# Patient Record
Sex: FEMALE | Race: WHITE | NOT HISPANIC OR LATINO | Employment: FULL TIME | URBAN - METROPOLITAN AREA
[De-identification: names, ages, dates, MRNs, and addresses within clinical notes are randomized per-mention and may not be internally consistent; named-entity substitution may affect disease eponyms.]

---

## 2017-01-05 ENCOUNTER — RADIANT APPOINTMENT (OUTPATIENT)
Dept: GENERAL RADIOLOGY | Facility: CLINIC | Age: 32
End: 2017-01-05
Attending: FAMILY MEDICINE
Payer: COMMERCIAL

## 2017-01-05 ENCOUNTER — OFFICE VISIT (OUTPATIENT)
Dept: URGENT CARE | Facility: URGENT CARE | Age: 32
End: 2017-01-05
Payer: COMMERCIAL

## 2017-01-05 VITALS
DIASTOLIC BLOOD PRESSURE: 68 MMHG | TEMPERATURE: 98.2 F | SYSTOLIC BLOOD PRESSURE: 120 MMHG | OXYGEN SATURATION: 99 % | HEART RATE: 92 BPM

## 2017-01-05 DIAGNOSIS — S32.2XXA CLOSED FRACTURE OF COCCYX, INITIAL ENCOUNTER (H): Primary | ICD-10-CM

## 2017-01-05 DIAGNOSIS — M62.830 BACK MUSCLE SPASM: ICD-10-CM

## 2017-01-05 DIAGNOSIS — S39.92XA TAILBONE INJURY, INITIAL ENCOUNTER: ICD-10-CM

## 2017-01-05 PROCEDURE — 99214 OFFICE O/P EST MOD 30 MIN: CPT | Performed by: FAMILY MEDICINE

## 2017-01-05 PROCEDURE — 72220 X-RAY EXAM SACRUM TAILBONE: CPT

## 2017-01-05 RX ORDER — HYDROCODONE BITARTRATE AND ACETAMINOPHEN 5; 325 MG/1; MG/1
1-2 TABLET ORAL EVERY 6 HOURS PRN
Qty: 12 TABLET | Refills: 0 | Status: SHIPPED | OUTPATIENT
Start: 2017-01-05 | End: 2017-01-11

## 2017-01-05 RX ORDER — FLUOXETINE 10 MG/1
40 CAPSULE ORAL EVERY MORNING
Qty: 90 CAPSULE | Refills: 3 | COMMUNITY
Start: 2017-01-05 | End: 2021-10-25

## 2017-01-05 RX ORDER — CYCLOBENZAPRINE HCL 5 MG
5 TABLET ORAL EVERY 8 HOURS PRN
Qty: 30 TABLET | Refills: 0 | Status: SHIPPED | OUTPATIENT
Start: 2017-01-05 | End: 2017-02-07

## 2017-01-05 RX ORDER — ALBUTEROL SULFATE 90 UG/1
2 AEROSOL, METERED RESPIRATORY (INHALATION) EVERY 6 HOURS PRN
Qty: 3 INHALER | Refills: 1 | COMMUNITY
Start: 2017-01-05 | End: 2017-12-12

## 2017-01-05 NOTE — NURSING NOTE
Chief Complaint   Patient presents with     Urgent Care     Fall     Slipped on the ice on Sunday and landed on [facility].  Pain not improving after resting for a couple of days.     Initial /68 mmHg  Pulse 92  Temp(Src) 98.2  F (36.8  C) (Oral)  SpO2 99% There is no height or weight on file to calculate BMI.  BP completed using cuff size  regular    Ghazala Cerda/CMA

## 2017-01-05 NOTE — PROGRESS NOTES
"SUBJECTIVE:  Chief Complaint   Patient presents with     Urgent Care     Fall     Slipped on the ice on Sunday and landed on tailbone.  Pain not improving after resting for a couple of days.     Lizet Condon is a 31 year old female presents with a chief complaint of tailbone pain.  The injury occurred 4 day(s) ago (Sunday afternoon)   The injury happened while outside. How: fall, slipped on ice, landed on bottom on concrete, developed immediate pain.  Patient was able to get up, worse the following day.  The patient complained of moderate pain  and has had decreased ROM.  Pain exacerbated by standing and sitting.  Relieved by no movement, has to try to sit on side.  She treated it initially with ice and Ibuprofen. This is the first time this type of injury has occurred to this patient.     Patient states that had \"food poisoning\" for the past couple of days but has improved now.    No past medical history on file.  Current Outpatient Prescriptions   Medication Sig Dispense Refill     FLUoxetine (PROZAC) 10 MG capsule Take 1 capsule (10 mg) by mouth daily 90 capsule 3     albuterol (PROAIR HFA/PROVENTIL HFA/VENTOLIN HFA) 108 (90 BASE) MCG/ACT Inhaler Inhale 2 puffs into the lungs every 6 hours as needed for shortness of breath / dyspnea or wheezing 3 Inhaler 1     KLONOPIN 1 MG OR TABS 1 TABLET 3 TIMES DAILY 90 0     Social History   Substance Use Topics     Smoking status: Never Smoker      Smokeless tobacco: Not on file     Alcohol Use: Not on file       ROS:  CONSTITUTIONAL:NEGATIVE for fever, chills, change in weight  INTEGUMENTARY/SKIN: NEGATIVE for worrisome rashes, moles or lesions  ENT/MOUTH: NEGATIVE for ear, mouth and throat problems  RESP:NEGATIVE for significant cough or SOB  CV: NEGATIVE for chest pain, palpitations or peripheral edema  GI: NEGATIVE for nausea, abdominal pain, heartburn, or change in bowel habits  MUSCULOSKELETAL: POSITIVE  for injury to tailbone    EXAM:   /68 mmHg  " Pulse 92  Temp(Src) 98.2  F (36.8  C) (Oral)  SpO2 99%  Gen: healthy,alert,mild distress  Extremity: back and sacrum - has point tenderness along sacrum and decreased ROM, mild tenderness along paraspinal muscles.  No SI joint tenderness   There is not compromise to the distal circulation.  Pulses are +2 and CRT is brisk  EXTREMITIES: peripheral pulses normal  SKIN: no suspicious lesions or rashes  NEURO: Normal strength and tone, sensory exam grossly normal, mentation intact and speech normal    X-RAY was done - coccyx - irregular angulation at distal coccyx, possible fracture    ASSESSMENT/PLAN:   (S32.2XXA) Closed fracture of coccyx, initial encounter (H)  (primary encounter diagnosis)  Plan: HYDROcodone-acetaminophen (NORCO)        5-325 MG per tablet            (S39.92XA) Tailbone injury, initial encounter  Plan: XR Sacrum and Coccyx 2 Views            (M62.830) Back muscle spasm  Plan: cyclobenzaprine (FLEXERIL) 5 MG tablet            Reviewed symptomatic treatment with rest, ice, ibuprofen, doughnut pillow.  RX for flexeril given to help with back muscle spasm.  Small quantity of norco 5/325 mg #12 given for worse pain.  Follow up with primary provider if no resolution of symptoms.    Dm Zaldivar MD

## 2017-01-05 NOTE — MR AVS SNAPSHOT
After Visit Summary   1/5/2017    Lizet Condon    MRN: [id number]           Patient Information     Date Of Birth          1985        Visit Information        Provider Department      1/5/2017 12:00 PM Dm Zaldivar MD [facility]        Today's Diagnoses     Closed fracture of coccyx, initial encounter (H)    -  1     Tailbone injury, initial encounter         Back muscle spasm           Care Instructions    Okay to take ibuprofen 200 mg - 4 tablets (800 mg) every 8 hours as needed.  Okay to take tylenol 500 mg - 2 tablets (1000 mg) every 6-8 hours as needed, do not exceed 3000 mg in 24 hours.  Okay to take flexeril 5 mg - 1-2 tablets every 8 hours as needed for back muscles spasm.  Use norco sparingly for worse pain.  Ice to area of discomfort.      Tailbone (Coccyx) Fracture    The tailbone, or coccyx, is at the bottom of your spine. It is possible to fracture this bone when you fall and land in a seated position. This injury takes about 4 weeks to heal. Until then, it will be painful to sit and to have bowel movements.  Home care  1. Lying down or standing will be more comfortable than sitting. When you must sit, use a doughnut pillow. This is sold at most pharmacies or surgical and orthopedic supply stores. You can also make a doughnut pillow using a 4-inch foam pad with the center cut out.  2. Apply an ice pack over the injured area for not more than 20 minutes. Do this every 1 to 2 hours for the first 24 to 48 hours. Keep using ice packs as needed to ease pain and swelling. To make an ice pack, put ice cubes in a plastic bag that seals at the top. Wrap the bag in a clean, thin towel or cloth. Never put ice or an ice pack directly on the skin.  3. You may use over-the-counter pain medicine, unless another pain medicine was prescribed. Talk with your provider before using these medicines if you have chronic liver or kidney disease or ever had a stomach ulcer or GI  bleeding.  4. Keep your stools soft to avoid pain when having a bowel movement. Unless another medicine was prescribed, try the following:    If you are constipated: Use over-the-counter laxatives. Ask your pharmacist for recommendations for mild acting or stronger acting medicines    If you are not constipated: Use over-the-counter stool softeners to make passing stool less painful. Your pharmacist can suggest options. Adding fiber to your diet or using fiber supplements is a long-term solution to keep your stools soft and prevent constipation. Ask your pharmacist to recommend a fiber supplement.  Follow-up care  Follow up with your healthcare provider if your symptoms do not improve after 1 week.  If X-rays were taken, you will be notified of any new findings that may affect your care.  When to seek medical advice  Call your healthcare provider right away if:    Pain becomes worse or spreads to your legs  Call 911  Call 911 if you have:    Weakness or numbness in one or both legs    Loss of control over bowels or bladder, or numbness in the groin area    6735-0360 [employer]. [address] [place] [address]. All rights reserved. This information is not intended as a substitute for professional medical care. Always follow your healthcare professional's instructions.        Back Spasm (No Trauma)    Spasm of the back muscles can occur after a sudden forceful twisting or bending force (such as in a car accident), after a simple awkward movement, or after lifting something heavy with poor body positioning. In either case, muscle spasm adds to the pain. Sleeping in an awkward position or on a poor quality mattress can also cause this. Some persons respond to emotional stress by tensing the muscles of their back.  Pain that continues may require further evaluation or other types of treatment such as physical therapy.  Unless you had a physical injury (for example, a car accident or fall), X-rays  are usually not ordered for the initial evaluation of back pain. If pain continues and does not respond to medical treatment, X-rays and other tests may be performed at a later time.   Home care  5. As soon as possible, begin sitting or walking to avoid problems from prolonged bedrest (muscle weakness, worsening back stiffness and pain, blood clots in the legs).  6. When in bed, try to find a position of comfort. A firm mattress is best. Try lying flat on your back with pillows under your knees. You can also try lying on your side with your knees bent up toward your chest and a pillow between your knees.  7. Avoid prolonged sitting, long car rides or travel. This puts more stress on the lower back than standing or walking.   8. During the first 24 to 72 hours after an injury of flare-up apply an ice pack to the painful area for 20 minutes, then remove it for 20 minutes over a period of 60 to 90 minutes or several times a day. This will reduce swelling and pain. Always wrap ice packs in a thin towel.  9. You can start with ice, then switch to heat. Heat (hot shower, hot bath, or heating pad) reduces swelling and pain, and works well for muscle spasms. Heat can be applied to the painful area for 20 minutes , then remove it for 20 minutes over a period of 60 to 90 minutes or several times a day. As a safety precaution, do not sleep on a heating pad as it can burn or damage skin.  10. Ice and heat therapies can be alternated.  11. Gentle stretching will help your back heal faster. Perform this simple routine 2 to 3 times a day until your back is feeling better.     Low back stretch    Lie on your back with your knees bent and both feet on the ground.    Slowly raise your left knee to your chest as you flatten your lower back against the floor. Hold for 20 to 30 seconds.    Relax and repeat the exercise with your right knee.    Do 2 to 3 of these exercises for each leg.    Repeat, hugging both knees to your chest at the  same time.    Do not bounce, but use a gentle pull.    12. Be aware of safe lifting methods and do not lift anything over 15 pounds until all the pain is gone.  Medications  Talk to your doctor before using medications, especially if you have other medical problems or are taking other medicines.  You may use acetaminophen (such as Tylenol) or ibuprofen (such as Advil or Motrin) to control pain, unless another pain medicine was prescribed. If you have a chronic condition such as diabetes, liver or kidney disease, stomach ulcer, or gastrointestinal bleeding, or are taking blood thinners, talk with your doctor before taking any medications.  Be careful if you are given prescription pain medications, narcotics, or medication for muscle spasm. They can cause drowsiness, affect your coordination, reflexes or judgment. Do not drive or operate heavy machinery.  Follow-up care  Follow up with your doctor or this facility if your symptoms do not start to improve after one week. Physical therapy or further tests may be needed.  If X-rays were taken, they will be reviewed by a radiologist. You will be notified of any new findings that may affect your care.  Call 911  Seek emergency medica care if any of the following occur:    Trouble breathing    Confusion    Drowsiness or trouble awakening    Fainting or loss of consciousness    Rapid or very slow heart rate    Loss of bowel or bladder control  When to seek medical care  Get prompt medicat attention if any of the following occur:    Pain becomes worse or spreads to your legs    Weakness or numbness in one or both legs    Numbness in the groin or genital area    Unexplained fever over 100.4 F (38.0 C)    Burning or pain when passing urine    2798-0191 [employer]. [address] [place] [address], [place] [address]. All rights reserved. This information is not intended as a substitute for professional medical care. Always follow your healthcare professional's  "instructions.              Follow-ups after your visit        Who to contact     If you have questions or need follow up information about today's clinic visit or your schedule please contact [facility] directly at [phone].  Normal or non-critical lab and imaging results will be communicated to you by MyChart, letter or phone within 4 business days after the clinic has received the results. If you do not hear from us within 7 days, please contact the clinic through MyChart or phone. If you have a critical or abnormal lab result, we will notify you by phone as soon as possible.  Submit refill requests through [employer] or call your pharmacy and they will forward the refill request to us. Please allow 3 business days for your refill to be completed.          Additional Information About Your Visit        [employer] Information     [employer] lets you send messages to your doctor, view your test results, renew your prescriptions, schedule appointments and more. To sign up, go to [email or web address] . Click on \"Log in\" on the left side of the screen, which will take you to the Welcome page. Then click on \"Sign up Now\" on the right side of the page.     You will be asked to enter the access code listed below, as well as some personal information. Please follow the directions to create your username and password.     Your access code is: [email or web address]  Expires: 2017  1:32 PM     Your access code will  in 90 days. If you need help or a new code, please call your [facility] clinic or [phone].        Care EveryWhere ID     This is your Care EveryWhere ID. This could be used by other organizations to access your [facility] medical records  [id number]        Your Vitals Were     Pulse Temperature Pulse Oximetry             92 98.2  F (36.8  C) (Oral) 99%          Blood Pressure from Last 3 Encounters:   17 120/68   08 118/60    Weight from Last 3 Encounters:   No data found for Wt    "              Today's Medication Changes          These changes are accurate as of: 1/5/17  1:32 PM.  If you have any questions, ask your nurse or doctor.               Start taking these medicines.        Dose/Directions    cyclobenzaprine 5 MG tablet   Commonly known as:  FLEXERIL   Used for:  Back muscle spasm   Started by:  Dm Zaldivar MD        Dose:  5 mg   Take 1 tablet (5 mg) by mouth every 8 hours as needed for muscle spasms   Quantity:  30 tablet   Refills:  0       HYDROcodone-acetaminophen 5-325 MG per tablet   Commonly known as:  NORCO   Used for:  Closed fracture of coccyx, initial encounter (H)   Started by:  Dm Zaldivar MD        Dose:  1-2 tablet   Take 1-2 tablets by mouth every 6 hours as needed for moderate to severe pain   Quantity:  12 tablet   Refills:  0            Where to get your medicines      These medications were sent to [id number] - [place], [place] - [address]  [address] [facility] [address]     Phone:  [phone]    - cyclobenzaprine 5 MG tablet      Some of these will need a paper prescription and others can be bought over the counter.  Ask your nurse if you have questions.     Bring a paper prescription for each of these medications    - HYDROcodone-acetaminophen 5-325 MG per tablet             Primary Care Provider    None Specified       No primary provider on file.        Thank you!     Thank you for choosing [facility]  for your care. Our goal is always to provide you with excellent care. Hearing back from our patients is one way we can continue to improve our services. Please take a few minutes to complete the written survey that you may receive in the mail after your visit with us. Thank you!             Your Updated Medication List - Protect others around you: Learn how to safely use, store and throw away your medicines at www.disposemymeds.org.          This list is accurate as of: 1/5/17  1:32 PM.  Always use your most  recent med list.                   Brand Name Dispense Instructions for use    albuterol 108 (90 BASE) MCG/ACT Inhaler    PROAIR HFA/PROVENTIL HFA/VENTOLIN HFA    3 Inhaler    Inhale 2 puffs into the lungs every 6 hours as needed for shortness of breath / dyspnea or wheezing       cyclobenzaprine 5 MG tablet    FLEXERIL    30 tablet    Take 1 tablet (5 mg) by mouth every 8 hours as needed for muscle spasms       FLUoxetine 10 MG capsule    PROzac    90 capsule    Take 1 capsule (10 mg) by mouth daily       HYDROcodone-acetaminophen 5-325 MG per tablet    NORCO    12 tablet    Take 1-2 tablets by mouth every 6 hours as needed for moderate to severe pain       klonoPIN 1 MG tablet   Generic drug:  clonazePAM     90    1 TABLET 3 TIMES DAILY

## 2017-01-05 NOTE — PATIENT INSTRUCTIONS
Okay to take ibuprofen 200 mg - 4 tablets (800 mg) every 8 hours as needed.  Okay to take tylenol 500 mg - 2 tablets (1000 mg) every 6-8 hours as needed, do not exceed 3000 mg in 24 hours.  Okay to take flexeril 5 mg - 1-2 tablets every 8 hours as needed for back muscles spasm.  Use norco sparingly for worse pain.  Ice to area of discomfort.      Tailbone (Coccyx) Fracture    The tailbone, or coccyx, is at the bottom of your spine. It is possible to fracture this bone when you fall and land in a seated position. This injury takes about 4 weeks to heal. Until then, it will be painful to sit and to have bowel movements.  Home care  1. Lying down or standing will be more comfortable than sitting. When you must sit, use a doughnut pillow. This is sold at most pharmacies or surgical and orthopedic supply stores. You can also make a doughnut pillow using a 4-inch foam pad with the center cut out.  2. Apply an ice pack over the injured area for not more than 20 minutes. Do this every 1 to 2 hours for the first 24 to 48 hours. Keep using ice packs as needed to ease pain and swelling. To make an ice pack, put ice cubes in a plastic bag that seals at the top. Wrap the bag in a clean, thin towel or cloth. Never put ice or an ice pack directly on the skin.  3. You may use over-the-counter pain medicine, unless another pain medicine was prescribed. Talk with your provider before using these medicines if you have chronic liver or kidney disease or ever had a stomach ulcer or GI bleeding.  4. Keep your stools soft to avoid pain when having a bowel movement. Unless another medicine was prescribed, try the following:    If you are constipated: Use over-the-counter laxatives. Ask your pharmacist for recommendations for mild acting or stronger acting medicines    If you are not constipated: Use over-the-counter stool softeners to make passing stool less painful. Your pharmacist can suggest options. Adding fiber to your diet or using  fiber supplements is a long-term solution to keep your stools soft and prevent constipation. Ask your pharmacist to recommend a fiber supplement.  Follow-up care  Follow up with your healthcare provider if your symptoms do not improve after 1 week.  If X-rays were taken, you will be notified of any new findings that may affect your care.  When to seek medical advice  Call your healthcare provider right away if:    Pain becomes worse or spreads to your legs  Call 911  Call 911 if you have:    Weakness or numbness in one or both legs    Loss of control over bowels or bladder, or numbness in the groin area    4684-9510 [employer]. [address] [place] [address]. All rights reserved. This information is not intended as a substitute for professional medical care. Always follow your healthcare professional's instructions.        Back Spasm (No Trauma)    Spasm of the back muscles can occur after a sudden forceful twisting or bending force (such as in a car accident), after a simple awkward movement, or after lifting something heavy with poor body positioning. In either case, muscle spasm adds to the pain. Sleeping in an awkward position or on a poor quality mattress can also cause this. Some persons respond to emotional stress by tensing the muscles of their back.  Pain that continues may require further evaluation or other types of treatment such as physical therapy.  Unless you had a physical injury (for example, a car accident or fall), X-rays are usually not ordered for the initial evaluation of back pain. If pain continues and does not respond to medical treatment, X-rays and other tests may be performed at a later time.   Home care  5. As soon as possible, begin sitting or walking to avoid problems from prolonged bedrest (muscle weakness, worsening back stiffness and pain, blood clots in the legs).  6. When in bed, try to find a position of comfort. A firm mattress is best. Try lying flat  on your back with pillows under your knees. You can also try lying on your side with your knees bent up toward your chest and a pillow between your knees.  7. Avoid prolonged sitting, long car rides or travel. This puts more stress on the lower back than standing or walking.   8. During the first 24 to 72 hours after an injury of flare-up apply an ice pack to the painful area for 20 minutes, then remove it for 20 minutes over a period of 60 to 90 minutes or several times a day. This will reduce swelling and pain. Always wrap ice packs in a thin towel.  9. You can start with ice, then switch to heat. Heat (hot shower, hot bath, or heating pad) reduces swelling and pain, and works well for muscle spasms. Heat can be applied to the painful area for 20 minutes , then remove it for 20 minutes over a period of 60 to 90 minutes or several times a day. As a safety precaution, do not sleep on a heating pad as it can burn or damage skin.  10. Ice and heat therapies can be alternated.  11. Gentle stretching will help your back heal faster. Perform this simple routine 2 to 3 times a day until your back is feeling better.     Low back stretch    Lie on your back with your knees bent and both feet on the ground.    Slowly raise your left knee to your chest as you flatten your lower back against the floor. Hold for 20 to 30 seconds.    Relax and repeat the exercise with your right knee.    Do 2 to 3 of these exercises for each leg.    Repeat, hugging both knees to your chest at the same time.    Do not bounce, but use a gentle pull.    12. Be aware of safe lifting methods and do not lift anything over 15 pounds until all the pain is gone.  Medications  Talk to your doctor before using medications, especially if you have other medical problems or are taking other medicines.  You may use acetaminophen (such as Tylenol) or ibuprofen (such as Advil or Motrin) to control pain, unless another pain medicine was prescribed. If you have a  chronic condition such as diabetes, liver or kidney disease, stomach ulcer, or gastrointestinal bleeding, or are taking blood thinners, talk with your doctor before taking any medications.  Be careful if you are given prescription pain medications, narcotics, or medication for muscle spasm. They can cause drowsiness, affect your coordination, reflexes or judgment. Do not drive or operate heavy machinery.  Follow-up care  Follow up with your doctor or this facility if your symptoms do not start to improve after one week. Physical therapy or further tests may be needed.  If X-rays were taken, they will be reviewed by a radiologist. You will be notified of any new findings that may affect your care.  Call 911  Seek emergency medica care if any of the following occur:    Trouble breathing    Confusion    Drowsiness or trouble awakening    Fainting or loss of consciousness    Rapid or very slow heart rate    Loss of bowel or bladder control  When to seek medical care  Get prompt medicat attention if any of the following occur:    Pain becomes worse or spreads to your legs    Weakness or numbness in one or both legs    Numbness in the groin or genital area    Unexplained fever over 100.4 F (38.0 C)    Burning or pain when passing urine    2901-3721 [employer]. [address] [place] [address], [place] [address]. All rights reserved. This information is not intended as a substitute for professional medical care. Always follow your healthcare professional's instructions.

## 2017-01-06 ENCOUNTER — TELEPHONE (OUTPATIENT)
Dept: URGENT CARE | Facility: URGENT CARE | Age: 32
End: 2017-01-06

## 2017-01-06 NOTE — Clinical Note
Lizet Condon  [id number] [facility] [address]    January 12, 2017    Dear Lizet,     Below are your results from the urgent care visit on 1/06/2017.     X-rays of the sacrum and coccyx showed no fracture. Continue ice applications and sitting on soft cushions. Take Tylenol, ibuprofen for pain. follow up with the primary care provider if not better in 2-3 weeks.    If you have any questions please call us at [phone].      Thank you,   [facility]

## 2017-01-06 NOTE — TELEPHONE ENCOUNTER
SUBJECTIVE:  The radiology report on the patient's January 5, 2017, X-rays of the sacrum and coccyx showed no fracture.      PLAN:  Please notify patient of this result.  Continue ice applications and sitting on soft cushions. Take Tylenol, ibuprofen for pain.  follow up with the primary care provider if not better in 2-3 weeks.     Davon Roth MD

## 2017-01-11 ENCOUNTER — OFFICE VISIT (OUTPATIENT)
Dept: PEDIATRICS | Facility: CLINIC | Age: 32
End: 2017-01-11
Payer: COMMERCIAL

## 2017-01-11 VITALS
DIASTOLIC BLOOD PRESSURE: 70 MMHG | HEIGHT: 62 IN | TEMPERATURE: 99.1 F | BODY MASS INDEX: 25.76 KG/M2 | HEART RATE: 92 BPM | OXYGEN SATURATION: 100 % | SYSTOLIC BLOOD PRESSURE: 100 MMHG | WEIGHT: 140 LBS

## 2017-01-11 DIAGNOSIS — S32.2XXA CLOSED FRACTURE OF COCCYX, INITIAL ENCOUNTER (H): Primary | ICD-10-CM

## 2017-01-11 PROCEDURE — 99213 OFFICE O/P EST LOW 20 MIN: CPT | Mod: GE | Performed by: INTERNAL MEDICINE

## 2017-01-11 RX ORDER — HYDROCODONE BITARTRATE AND ACETAMINOPHEN 5; 325 MG/1; MG/1
1-2 TABLET ORAL EVERY 6 HOURS PRN
Qty: 12 TABLET | Refills: 0 | Status: SHIPPED | OUTPATIENT
Start: 2017-01-11 | End: 2017-01-19

## 2017-01-11 NOTE — PATIENT INSTRUCTIONS
1. 600 mg of ibuprofen three time a day for the next week to decrease inflammation.   2. OK to use tylenol in addition, don't use more than 3000 mg in 24 hours.  3. Use the Norco as needed for pain, be aware of side effects of somnolence, no driving, heavy machinery, etc. Can also cause constipation.   4. If your pain worsens or is not dramatically improved by the end of January, call and we can refer to sports med/ortho for evaluation of need for further interventions.

## 2017-01-11 NOTE — PROGRESS NOTES
"  SUBJECTIVE:                                                    Lizet Condon is a 31 year old female who presents to clinic today for the following health issues:      ED/UC Followup:    Facility:  [facility]   Date of visit: 01-05-17  Reason for visit: Closed fracture of coccyx   Current Status: Pt reports no improvement, still continuing to have pain, difficult to move. Pt has been using an inflatable neck pillow to sit on.      10 days ago she was walking on ice, slipped, and fell on buttocks. Was seen in UC 6 days ago and had xr with some angulation, no obvious fracture. Continues to have severe pain, worse with bending over, reaching forward, getting in and out of the car. Has been using an inflatable neck pillow to sit on which helps. If she moves at the wrong angle she has to stop and wrest. She is standing a lot of the time a work. Also feels like a kettle bell is pushing forward in her pelvis. Every morning the pain seems a bit better but then throughout the day it becomes quite severe again. Sleep has helped, Flexeril has helped, donut helps. Norco helps the pain. No bowel or bladder problems. No leg weakness or numbness.     Problem list and histories reviewed & adjusted, as indicated.  Additional history: as documented    Problem list, Medication list, Allergies, and Medical/Social/Surgical histories reviewed in [facility] and updated as appropriate.    ROS:  Constitutional, HEENT, cardiovascular, pulmonary, gi and gu systems are negative, except as otherwise noted.    OBJECTIVE:                                                    /70 mmHg  Pulse 92  Temp(Src) 99.1  F (37.3  C) (Tympanic)  Ht 5' 2\" (1.575 m)  Wt 140 lb (63.504 kg)  BMI 25.60 kg/m2  SpO2 100%  Body mass index is 25.6 kg/(m^2).  GENERAL: healthy, alert and no distress  NECK: no adenopathy, no asymmetry, masses, or scars and thyroid normal to palpation  RESP: lungs clear to auscultation - no rales, rhonchi or " wheezes  CV: regular rate and rhythm, normal S1 S2, no S3 or S4, no murmur, click or rub, no peripheral edema and peripheral pulses strong  Comprehensive back pain exam:  Tenderness of the coccyx at the superior aspect of the gluteal cleft with some mild ecchymosis, Range of motion not limited by pain, Lower extremity strength functional and equal on both sides, Lower extremity reflexes within normal limits bilaterally, Lower extremity sensation normal and equal on both sides. Able to walk on heels and toes.     Diagnostic Test Results:  none      ASSESSMENT/PLAN:                                                    1. Closed fracture of coccyx, initial encounter (H)  Though no obvious fracture on xray with the angulation that is present, symptoms, and exam suspect that she does have a fracture. She has no neuro findings and no midline back pain to suggest additional injury. We will do scheduled ibuprofen for 1 week with Norco for breakthrough pain. If not improved dramatically by the end of the month will refer to sports med/ortho for further evaluation. Reviewed appropriate use of Norco.   - HYDROcodone-acetaminophen (NORCO) 5-325 MG per tablet; Take 1-2 tablets by mouth every 6 hours as needed for moderate to severe pain  Dispense: 12 tablet; Refill: 0    Patient Instructions   1. 600 mg of ibuprofen three time a day for the next week to decrease inflammation.   2. OK to use tylenol in addition, don't use more than 3000 mg in 24 hours.  3. Use the Norco as needed for pain, be aware of side effects of somnolence, no driving, heavy machinery, etc. Can also cause constipation.   4. If your pain worsens or is not dramatically improved by the end of January, call and we can refer to sports med/ortho for evaluation of need for further interventions.       Pedro Pettit MD  [facility]    I have discussed the patient with the resident and agree with the jointly developed plan as documented  above    Connie Hansen MD  Internal Medicine - Pediatrics

## 2017-01-11 NOTE — MR AVS SNAPSHOT
"              After Visit Summary   1/11/2017    Lizet Condon    MRN: [id number]           Patient Information     Date Of Birth          1985        Visit Information        Provider Department      1/11/2017 1:00 PM [facility]        Today's Diagnoses     Closed fracture of coccyx, initial encounter (H)    -  1       Care Instructions    1. 600 mg of ibuprofen three time a day for the next week to decrease inflammation.   2. OK to use tylenol in addition, don't use more than 3000 mg in 24 hours.  3. Use the Norco as needed for pain, be aware of side effects of somnolence, no driving, heavy machinery, etc. Can also cause constipation.   4. If your pain worsens or is not dramatically improved by the end of January, call and we can refer to sports med/ortho for evaluation of need for further interventions.         Follow-ups after your visit        Who to contact     If you have questions or need follow up information about today's clinic visit or your schedule please contact [facility] directly at [phone].  Normal or non-critical lab and imaging results will be communicated to you by friendfundhart, letter or phone within 4 business days after the clinic has received the results. If you do not hear from us within 7 days, please contact the clinic through [employer] or phone. If you have a critical or abnormal lab result, we will notify you by phone as soon as possible.  Submit refill requests through [employer] or call your pharmacy and they will forward the refill request to us. Please allow 3 business days for your refill to be completed.          Additional Information About Your Visit        MyChart Information     [employer] lets you send messages to your doctor, view your test results, renew your prescriptions, schedule appointments and more. To sign up, go to [email or web address] . Click on \"Log in\" on the left side of the screen, which will take " "you to the Welcome page. Then click on \"Sign up Now\" on the right side of the page.     You will be asked to enter the access code listed below, as well as some personal information. Please follow the directions to create your username and password.     Your access code is: [email or web address]  Expires: 2017  1:32 PM     Your access code will  in 90 days. If you need help or a new code, please call your [place] clinic or [phone].        Care EveryWhere ID     This is your Care EveryWhere ID. This could be used by other organizations to access your [place] medical records  [id number]        Your Vitals Were     Pulse Temperature Height BMI (Body Mass Index) Pulse Oximetry       92 99.1  F (37.3  C) (Tympanic) 5' 2\" (1.575 m) 25.60 kg/m2 100%        Blood Pressure from Last 3 Encounters:   17 100/70   17 120/68   08 118/60    Weight from Last 3 Encounters:   17 140 lb (63.504 kg)              Today, you had the following     No orders found for display         Where to get your medicines      Some of these will need a paper prescription and others can be bought over the counter.  Ask your nurse if you have questions.     Bring a paper prescription for each of these medications    - HYDROcodone-acetaminophen 5-325 MG per tablet       Primary Care Provider    None Specified       No primary provider on file.        Thank you!     Thank you for choosing [facility]  for your care. Our goal is always to provide you with excellent care. Hearing back from our patients is one way we can continue to improve our services. Please take a few minutes to complete the written survey that you may receive in the mail after your visit with us. Thank you!             Your Updated Medication List - Protect others around you: Learn how to safely use, store and throw away your medicines at www.disposemymeds.org.          This list is accurate as of: 17  1:40 PM.  Always use your most " recent med list.                   Brand Name Dispense Instructions for use    albuterol 108 (90 BASE) MCG/ACT Inhaler    PROAIR HFA/PROVENTIL HFA/VENTOLIN HFA    3 Inhaler    Inhale 2 puffs into the lungs every 6 hours as needed for shortness of breath / dyspnea or wheezing       cyclobenzaprine 5 MG tablet    FLEXERIL    30 tablet    Take 1 tablet (5 mg) by mouth every 8 hours as needed for muscle spasms       FLUoxetine 10 MG capsule    PROzac    90 capsule    Take 1 capsule (10 mg) by mouth daily       HYDROcodone-acetaminophen 5-325 MG per tablet    NORCO    12 tablet    Take 1-2 tablets by mouth every 6 hours as needed for moderate to severe pain       klonoPIN 1 MG tablet   Generic drug:  clonazePAM     90    1 TABLET 3 TIMES DAILY

## 2017-01-11 NOTE — NURSING NOTE
"Chief Complaint   Patient presents with     RECHECK     Follow up        Initial /70 mmHg  Pulse 92  Temp(Src) 99.1  F (37.3  C) (Tympanic)  Ht 5' 2\" (1.575 m)  Wt 140 lb (63.504 kg)  BMI 25.60 kg/m2  SpO2 100% Estimated body mass index is 25.6 kg/(m^2) as calculated from the following:    Height as of this encounter: 5' 2\" (1.575 m).    Weight as of this encounter: 140 lb (63.504 kg).  BP completed using cuff size: regular    "

## 2017-01-19 DIAGNOSIS — S32.2XXA CLOSED FRACTURE OF COCCYX, INITIAL ENCOUNTER (H): Primary | ICD-10-CM

## 2017-01-19 RX ORDER — HYDROCODONE BITARTRATE AND ACETAMINOPHEN 5; 325 MG/1; MG/1
1-2 TABLET ORAL EVERY 6 HOURS PRN
Qty: 12 TABLET | Refills: 0 | Status: SHIPPED | OUTPATIENT
Start: 2017-01-19 | End: 2017-02-08

## 2017-01-19 NOTE — TELEPHONE ENCOUNTER
Placed RX at  , spoke to patient and informed her of below - pt verbalized understanding.     Zuleyma James MA

## 2017-01-19 NOTE — TELEPHONE ENCOUNTER
Script printed, signed, and in station out basket.    If not improving over the next week, would recommend that patient see orthopedics - please let her know.  I'm happy to send her anytime.

## 2017-01-19 NOTE — TELEPHONE ENCOUNTER
HYDROCO-ACETAMINOPHEN 5/325MG      Last Written Prescription Date:  1/11/2017  Last Fill Quantity: 12,   # refills: 0  Last Office Visit with [facility] prescribing provider: 1/11/2017  Future Office visit:       Routing refill request to provider for review/approval because:  Drug not on the [facility] refill protocol or controlled substance

## 2017-02-07 DIAGNOSIS — M62.830 BACK MUSCLE SPASM: Primary | ICD-10-CM

## 2017-02-07 DIAGNOSIS — S32.2XXA CLOSED FRACTURE OF COCCYX, INITIAL ENCOUNTER (H): ICD-10-CM

## 2017-02-07 NOTE — TELEPHONE ENCOUNTER
cyclobenzaprine (FLEXERIL) 5 MG tablet  Last Written Prescription Date:  1/5/17  Last Fill Quantity: 30,   # refills: 0  Last Office Visit with [facility] prescribing provider: 1/11/17 Dr. Agudelo and 1/5/17 Dr. Zaldivar  Future Office visit:    Next 5 appointments (look out 90 days)     Feb 09, 2017  3:45 PM   [facility]    [address] [place] [id number] [phone]                   Routing refill request to provider for review/approval because:  Drug not on the [facility] refill protocol or controlled substance

## 2017-02-08 RX ORDER — CYCLOBENZAPRINE HCL 5 MG
5 TABLET ORAL EVERY 8 HOURS PRN
Qty: 30 TABLET | Refills: 0 | Status: SHIPPED | OUTPATIENT
Start: 2017-02-08 | End: 2017-03-06

## 2017-02-08 RX ORDER — HYDROCODONE BITARTRATE AND ACETAMINOPHEN 5; 325 MG/1; MG/1
1-2 TABLET ORAL EVERY 6 HOURS PRN
Qty: 12 TABLET | Refills: 0 | Status: SHIPPED | OUTPATIENT
Start: 2017-02-08 | End: 2017-02-15

## 2017-02-08 NOTE — TELEPHONE ENCOUNTER
HYDROcodone-acetaminophen (NORCO) 5-325 MG per tablet        Last Written Prescription Date:  01/19/2017  Last Fill Quantity: 12,   # refills: 0  Last Office Visit with [facility] prescribing provider: 01/11/2016  Future Office visit:    Next 5 appointments (look out 90 days)     Feb 09, 2017  3:45 PM   [facility]    [address] [place] [id number] [phone]                   Routing refill request to provider for review/approval because:  Drug not on the [facility], [facility] or [facility] refill protocol or controlled substance

## 2017-02-08 NOTE — TELEPHONE ENCOUNTER
Routing refill request to provider for review/approval because:  Drug not on the [facility] refill protocol please sign if ok.   Sarah Huynh, ERNESTINA  Triage Nurse

## 2017-02-09 ENCOUNTER — OFFICE VISIT (OUTPATIENT)
Dept: PEDIATRICS | Facility: CLINIC | Age: 32
End: 2017-02-09
Payer: COMMERCIAL

## 2017-02-09 VITALS
RESPIRATION RATE: 18 BRPM | OXYGEN SATURATION: 99 % | DIASTOLIC BLOOD PRESSURE: 74 MMHG | WEIGHT: 152.56 LBS | SYSTOLIC BLOOD PRESSURE: 122 MMHG | HEIGHT: 62 IN | BODY MASS INDEX: 28.07 KG/M2 | TEMPERATURE: 99 F | HEART RATE: 101 BPM

## 2017-02-09 DIAGNOSIS — S32.2XXD CLOSED FRACTURE OF COCCYX WITH ROUTINE HEALING, SUBSEQUENT ENCOUNTER: Primary | ICD-10-CM

## 2017-02-09 PROCEDURE — 99214 OFFICE O/P EST MOD 30 MIN: CPT | Mod: GC | Performed by: STUDENT IN AN ORGANIZED HEALTH CARE EDUCATION/TRAINING PROGRAM

## 2017-02-09 RX ORDER — NORGESTIMATE AND ETHINYL ESTRADIOL 7DAYSX3 28
KIT ORAL
COMMUNITY
Start: 2017-01-14 | End: 2017-12-12

## 2017-02-09 NOTE — MR AVS SNAPSHOT
After Visit Summary   2/9/2017    Lizet Condon    MRN: [id number]           Patient Information     Date Of Birth          1985        Visit Information        Provider Department      2/9/2017 3:45 PM Eliazar Beasley MD [facility]        Today's Diagnoses     Closed fracture of coccyx with routine healing, subsequent encounter    -  1       Care Instructions      Tailbone (Coccyx) Fracture    The tailbone, or coccyx, is at the bottom of your spine. It is possible to fracture this bone when you fall and land in a seated position. This injury takes about 4 weeks to heal. Until then, it will be painful to sit and to have bowel movements.  Home care    Lying down or standing will be more comfortable than sitting. When you must sit, use a doughnut pillow. This is sold at most pharmacies or surgical and orthopedic supply stores. You can also make a doughnut pillow using a 4-inch foam pad with the center cut out.    Apply an ice pack over the injured area for not more than 20 minutes. Do this every 1 to 2 hours for the first 24 to 48 hours. Keep using ice packs as needed to ease pain and swelling. To make an ice pack, put ice cubes in a plastic bag that seals at the top. Wrap the bag in a clean, thin towel or cloth. Never put ice or an ice pack directly on the skin.    You may use over-the-counter pain medicine, unless another pain medicine was prescribed. Talk with your provider before using these medicines if you have chronic liver or kidney disease or ever had a stomach ulcer or GI bleeding.    Keep your stools soft to avoid pain when having a bowel movement. Unless another medicine was prescribed, try the following:    If you are constipated: Use over-the-counter laxatives. Ask your pharmacist for recommendations for mild acting or stronger acting medicines    If you are not constipated: Use over-the-counter stool softeners to make passing stool less painful. Your  pharmacist can suggest options. Adding fiber to your diet or using fiber supplements is a long-term solution to keep your stools soft and prevent constipation. Ask your pharmacist to recommend a fiber supplement.  Follow-up care  Follow up with your healthcare provider if your symptoms do not improve after 1 week.  If X-rays were taken, you will be notified of any new findings that may affect your care.  When to seek medical advice  Call your healthcare provider right away if:    Pain becomes worse or spreads to your legs  Call 911  Call 911 if you have:    Weakness or numbness in one or both legs    Loss of control over bowels or bladder, or numbness in the groin area    7541-4371 [employer]. [address] [place] [address], [place] [address]. All rights reserved. This information is not intended as a substitute for professional medical care. Always follow your healthcare professional's instructions.    Please continue to rehab with physical exercises provided.  Please continue to dough nut pillow to provide cushioning.  Take Tylenol 650 mg every 6 hours and alternate with ibuprofen 600 mg every 6 hours.   Ice the area as tolerated.  Use norco and flexeril for break through pain and muscle tightness/spasms.  If you have any loss of bowel/bladder, loss of sensation in lower legs, falling frequently, or saddle paresthesias please go to your local ED for further evaluation.       Eliazar Beasley MD  IM/PEDS, PGY2  pager [phone]          Follow-ups after your visit        Who to contact     If you have questions or need follow up information about today's clinic visit or your schedule please contact [facility] directly at [phone].  Normal or non-critical lab and imaging results will be communicated to you by MyChart, letter or phone within 4 business days after the clinic has received the results. If you do not hear from us within 7 days, please contact the clinic through MyChart or phone.  "If you have a critical or abnormal lab result, we will notify you by phone as soon as possible.  Submit refill requests through [employer] or call your pharmacy and they will forward the refill request to us. Please allow 3 business days for your refill to be completed.          Additional Information About Your Visit        [employer] Information     [employer] lets you send messages to your doctor, view your test results, renew your prescriptions, schedule appointments and more. To sign up, go to [email or web address] . Click on \"Log in\" on the left side of the screen, which will take you to the Welcome page. Then click on \"Sign up Now\" on the right side of the page.     You will be asked to enter the access code listed below, as well as some personal information. Please follow the directions to create your username and password.     Your access code is: [email or web address]  Expires: 2017  1:32 PM     Your access code will  in 90 days. If you need help or a new code, please call your [place] clinic or [phone].        Care EveryWhere ID     This is your Care EveryWhere ID. This could be used by other organizations to access your [place] medical records  [id number]        Your Vitals Were     Pulse Temperature Respirations Height BMI (Body Mass Index) Pulse Oximetry    101 99  F (37.2  C) (Tympanic) 18 1.575 m (5' 2\") 27.90 kg/m2 99%    Breastfeeding?                   No            Blood Pressure from Last 3 Encounters:   17 122/74   17 100/70   17 120/68    Weight from Last 3 Encounters:   17 69.202 kg (152 lb 9 oz)   17 63.504 kg (140 lb)              Today, you had the following     No orders found for display       Primary Care Provider    None Specified       No primary provider on file.        Thank you!     Thank you for choosing [facility]  for your care. Our goal is always to provide you with excellent care. Hearing back from our patients is one way we can " continue to improve our services. Please take a few minutes to complete the written survey that you may receive in the mail after your visit with us. Thank you!             Your Updated Medication List - Protect others around you: Learn how to safely use, store and throw away your medicines at www.disposemymeds.org.          This list is accurate as of: 2/9/17  4:07 PM.  Always use your most recent med list.                   Brand Name Dispense Instructions for use    albuterol 108 (90 BASE) MCG/ACT Inhaler    PROAIR HFA/PROVENTIL HFA/VENTOLIN HFA    3 Inhaler    Inhale 2 puffs into the lungs every 6 hours as needed for shortness of breath / dyspnea or wheezing       cyclobenzaprine 5 MG tablet    FLEXERIL    30 tablet    Take 1 tablet (5 mg) by mouth every 8 hours as needed for muscle spasms       FLUoxetine 10 MG capsule    PROzac    90 capsule    Take 1 capsule (10 mg) by mouth daily       HYDROcodone-acetaminophen 5-325 MG per tablet    NORCO    12 tablet    Take 1-2 tablets by mouth every 6 hours as needed for moderate to severe pain       klonoPIN 1 MG tablet   Generic drug:  clonazePAM     90    1 TABLET 3 TIMES DAILY       norgestim-eth estrad triphasic 0.18/0.215/0.25 MG-35 MCG per tablet    ORTHO TRI-CYCLEN, TRI-SPRINTEC

## 2017-02-09 NOTE — PATIENT INSTRUCTIONS
Tailbone (Coccyx) Fracture    The tailbone, or coccyx, is at the bottom of your spine. It is possible to fracture this bone when you fall and land in a seated position. This injury takes about 4 weeks to heal. Until then, it will be painful to sit and to have bowel movements.  Home care    Lying down or standing will be more comfortable than sitting. When you must sit, use a doughnut pillow. This is sold at most pharmacies or surgical and orthopedic supply stores. You can also make a doughnut pillow using a 4-inch foam pad with the center cut out.    Apply an ice pack over the injured area for not more than 20 minutes. Do this every 1 to 2 hours for the first 24 to 48 hours. Keep using ice packs as needed to ease pain and swelling. To make an ice pack, put ice cubes in a plastic bag that seals at the top. Wrap the bag in a clean, thin towel or cloth. Never put ice or an ice pack directly on the skin.    You may use over-the-counter pain medicine, unless another pain medicine was prescribed. Talk with your provider before using these medicines if you have chronic liver or kidney disease or ever had a stomach ulcer or GI bleeding.    Keep your stools soft to avoid pain when having a bowel movement. Unless another medicine was prescribed, try the following:    If you are constipated: Use over-the-counter laxatives. Ask your pharmacist for recommendations for mild acting or stronger acting medicines    If you are not constipated: Use over-the-counter stool softeners to make passing stool less painful. Your pharmacist can suggest options. Adding fiber to your diet or using fiber supplements is a long-term solution to keep your stools soft and prevent constipation. Ask your pharmacist to recommend a fiber supplement.  Follow-up care  Follow up with your healthcare provider if your symptoms do not improve after 1 week.  If X-rays were taken, you will be notified of any new findings that may affect your care.  When to  seek medical advice  Call your healthcare provider right away if:    Pain becomes worse or spreads to your legs  Call 911  Call 911 if you have:    Weakness or numbness in one or both legs    Loss of control over bowels or bladder, or numbness in the groin area    [id number] [employer]. [address] [place] [address]. All rights reserved. This information is not intended as a substitute for professional medical care. Always follow your healthcare professional's instructions.    Please continue to rehab with physical exercises provided.  Please continue to dough nut pillow to provide cushioning.  Take Tylenol 650 mg every 6 hours and alternate with ibuprofen 600 mg every 6 hours.   Ice the area as tolerated.  Use norco and flexeril for break through pain and muscle tightness/spasms.  If you have any loss of bowel/bladder, loss of sensation in lower legs, falling frequently, or saddle paresthesias please go to your local ED for further evaluation.       Eliazar Beasley MD  IM/PEDS, PGY2  pager [phone]

## 2017-02-09 NOTE — NURSING NOTE
"No chief complaint on file.      Initial /74 mmHg  Pulse 101  Temp(Src) 99  F (37.2  C) (Tympanic)  Resp 18  Ht 5' 2\" (1.575 m)  Wt 152 lb 9 oz (69.202 kg)  BMI 27.90 kg/m2  SpO2 99%  Breastfeeding? No Estimated body mass index is 27.9 kg/(m^2) as calculated from the following:    Height as of this encounter: 5' 2\" (1.575 m).    Weight as of this encounter: 152 lb 9 oz (69.202 kg).  Medication Reconciliation: complete     Michelle Goodman MA 2/9/2017 3:44 PM      "

## 2017-02-09 NOTE — PROGRESS NOTES
SUBJECTIVE:                                                    Lizet Condon is a 31 year old female who presents to clinic today for the following health issues:      Pt is here today to follow up from office visit 1/11/17 for closed fracture coccyx 5 weeks ago.  Pt states she is doing better, able to move and bend but still in pain, pain level is at 6/10. Pt has been taking Norco and Flexeril which has been helping.  Taking every couple days with excessive work load.  Pt also has form from her work which needs to be completed.  She has also been using a donut pillow with relief in pain.  She would continue to rehab personally on her own as opposed to referral for further evaluation as it has been improving slowly but surely.  Denies any paresthesias or loss of strength; endorses slight difficulties with balance.  She attributes it mostly due to discomfort as opposed to pain.      Problem list and histories reviewed & adjusted, as indicated.  Additional history: as documented    There is no problem list on file for this patient.    No past surgical history on file.    Social History   Substance Use Topics     Smoking status: Never Smoker      Smokeless tobacco: Never Used     Alcohol Use: 0.0 oz/week     0 Standard drinks or equivalent per week     Family History   Problem Relation Age of Onset     Family History Negative Mother          Current Outpatient Prescriptions   Medication Sig Dispense Refill     norgestim-eth estrad triphasic (ORTHO TRI-CYCLEN, TRI-SPRINTEC) 0.18/0.215/0.25 MG-35 MCG per tablet        cyclobenzaprine (FLEXERIL) 5 MG tablet Take 1 tablet (5 mg) by mouth every 8 hours as needed for muscle spasms 30 tablet 0     HYDROcodone-acetaminophen (NORCO) 5-325 MG per tablet Take 1-2 tablets by mouth every 6 hours as needed for moderate to severe pain 12 tablet 0     FLUoxetine (PROZAC) 10 MG capsule Take 1 capsule (10 mg) by mouth daily 90 capsule 3     albuterol (PROAIR HFA/PROVENTIL  "HFA/VENTOLIN HFA) 108 (90 BASE) MCG/ACT Inhaler Inhale 2 puffs into the lungs every 6 hours as needed for shortness of breath / dyspnea or wheezing 3 Inhaler 1     KLONOPIN 1 MG OR TABS 1 TABLET 3 TIMES DAILY 90 0     Allergies   Allergen Reactions     No Known Drug Allergies      No lab results found.   BP Readings from Last 3 Encounters:   02/09/17 122/74   01/11/17 100/70   01/05/17 120/68    Wt Readings from Last 3 Encounters:   02/09/17 69.202 kg (152 lb 9 oz)   01/11/17 63.504 kg (140 lb)            Labs reviewed in EPIC  Problem list, Medication list, Allergies, and Medical/Social/Surgical histories reviewed in [facility] and updated as appropriate.    ROS:  A focused ROS was obtained and documented as above.    OBJECTIVE:                                                    /74 mmHg  Pulse 101  Temp(Src) 99  F (37.2  C) (Tympanic)  Resp 18  Ht 1.575 m (5' 2\")  Wt 69.202 kg (152 lb 9 oz)  BMI 27.90 kg/m2  SpO2 99%  Breastfeeding? No  Body mass index is 27.9 kg/(m^2).  GENERAL: healthy, alert and no distress  NECK: no adenopathy, no asymmetry, masses, or scars and thyroid normal to palpation  RESP: lungs clear to auscultation - no rales, rhonchi or wheezes  CV: regular rate and rhythm, normal S1 S2, no S3 or S4, no murmur, click or rub, no peripheral edema and peripheral pulses strong  Comprehensive back pain exam:  Tenderness of the coccyx at the superior aspect of the gluteal cleft.  Range of motion not limited by pain, 5/5 strength in LE bilaterally. Lower extremity reflexes within normal limits bilaterally, Lower extremity sensation normal and equal on both sides. Able to walk on heels and toes.     Diagnostic Test Results:  none      ASSESSMENT/PLAN:                                                    1. Closed fracture of coccyx with routine healing, subsequent encounter  Reivewed xray demonstrating distal coccyx angulation with no signs of acute fracture.  Improvement in pain as well as ROM  - " please perform exercises to strengthen the coccyx, take Tylenol and ibuprofen as prescribed, norco and flexeril for break through pain and muscle spasms/tightness, and use supportive cares eg ice and doughnut pillow  - can return to work without restrictions  - please seek immediate medical care if concerning signs and symptoms as described in discharge summary    Follow up as needed.    The patient was discussed with Dr. Gonzales, the attending, who agrees with the plan as stated above.    Eliazar Beasley MD  [facility]  pager [phone]

## 2017-02-14 NOTE — PROGRESS NOTES
I have seen this patient and examined him in the presence of Dr. Beasley.  I was present during the key components of the presenting complaints, physical exam, diagnosis, and plan, and fully concur with the plan as listed in the resident's note.

## 2017-02-15 DIAGNOSIS — S32.2XXA CLOSED FRACTURE OF COCCYX, INITIAL ENCOUNTER (H): ICD-10-CM

## 2017-02-15 NOTE — TELEPHONE ENCOUNTER
HYDROcodone-acetaminophen (NORCO) 5-325 MG per tablet       Last Written Prescription Date: 02/08/2017  Last Fill Quantity 12,  # refills: 0   Last Office Visit with [facility], [facility] or [facility] prescribing provider: Saw Dr. Beasley on 2/9/17                                            PLEASE CALL PATIENT WHEN READY.  SHE WILL  DOWNSTAIRS.

## 2017-02-16 RX ORDER — HYDROCODONE BITARTRATE AND ACETAMINOPHEN 5; 325 MG/1; MG/1
1-2 TABLET ORAL EVERY 6 HOURS PRN
Qty: 12 TABLET | Refills: 0 | Status: SHIPPED | OUTPATIENT
Start: 2017-02-16 | End: 2017-03-06

## 2017-03-06 DIAGNOSIS — M62.830 BACK MUSCLE SPASM: ICD-10-CM

## 2017-03-06 DIAGNOSIS — S32.2XXA CLOSED FRACTURE OF COCCYX, INITIAL ENCOUNTER (H): ICD-10-CM

## 2017-03-06 NOTE — TELEPHONE ENCOUNTER
HYDROcodone-acetaminophen (NORCO) 5-325 MG per tablet      Last Written Prescription Date:  2/16/17  Last Fill Quantity: 12,   # refills: 0  Last Office Visit with [facility], [facility] or [facility] prescribing provider: 2/9/17  Future Office visit:       Routing refill request to provider for review/approval because:  Drug not on the [facility], [facility] or [facility] refill protocol or controlled substance      cyclobenzaprine (FLEXERIL) 5 MG tablet      Last Written Prescription Date:  2/8/17  Last Fill Quantity: 30,   # refills: 0  Last Office Visit with [facility], [facility] or [facility] prescribing provider: 2/9/17  Future Office visit:       Routing refill request to provider for review/approval because:  Drug not on the [facility], [facility] or [facility] refill protocol or controlled substance

## 2017-03-07 RX ORDER — CYCLOBENZAPRINE HCL 5 MG
5 TABLET ORAL EVERY 8 HOURS PRN
Qty: 30 TABLET | Refills: 0 | Status: SHIPPED | OUTPATIENT
Start: 2017-03-07 | End: 2017-10-24

## 2017-03-07 RX ORDER — HYDROCODONE BITARTRATE AND ACETAMINOPHEN 5; 325 MG/1; MG/1
1-2 TABLET ORAL EVERY 6 HOURS PRN
Qty: 12 TABLET | Refills: 0 | Status: SHIPPED | OUTPATIENT
Start: 2017-03-07 | End: 2017-10-24

## 2017-03-28 DIAGNOSIS — S32.2XXA CLOSED FRACTURE OF COCCYX, INITIAL ENCOUNTER (H): ICD-10-CM

## 2017-03-28 NOTE — TELEPHONE ENCOUNTER
THis is for the coccyx fracture on 1/11/17? According to , she is also receiving clonazepam prescription. Due to high risk of narcotics and benzos, and no pain plan on prob list and that this is due to injury sustained >2 mo ago, I would prefer she schedule an OV to review her healing before giving another narcotic prescription. Please let he rknow.

## 2017-03-28 NOTE — TELEPHONE ENCOUNTER
HYDROcodone-acetaminophen (NORCO) 5-325 MG per tablet      Last Written Prescription Date:  3/7/17  Last Fill Quantity: 12,   # refills: 0  Last Office Visit with [facility], [facility] or [facility] prescribing provider: 1/11/17  Future Office visit:       Routing refill request to provider for review/approval because:  Drug not on the [facility], [facility] or [facility] refill protocol or controlled substance

## 2017-04-05 ENCOUNTER — OFFICE VISIT (OUTPATIENT)
Dept: PEDIATRICS | Facility: CLINIC | Age: 32
End: 2017-04-05
Payer: COMMERCIAL

## 2017-04-05 VITALS
DIASTOLIC BLOOD PRESSURE: 83 MMHG | HEART RATE: 115 BPM | BODY MASS INDEX: 27.31 KG/M2 | SYSTOLIC BLOOD PRESSURE: 127 MMHG | WEIGHT: 149.3 LBS | TEMPERATURE: 97.4 F

## 2017-04-05 DIAGNOSIS — M53.3 TAIL BONE PAIN: Primary | ICD-10-CM

## 2017-04-05 PROCEDURE — 99214 OFFICE O/P EST MOD 30 MIN: CPT | Performed by: NURSE PRACTITIONER

## 2017-04-05 NOTE — PATIENT INSTRUCTIONS
Stop the narcotics and continue the ibuprofen and/or tylenol. Ice the area, continue using your donut or pads. Expect a call from [facility] to schedule.

## 2017-04-05 NOTE — NURSING NOTE
"Chief Complaint   Patient presents with     RECHECK       Initial /83  Pulse 115  Temp 97.4  F (36.3  C) (Tympanic)  Wt 149 lb 4.8 oz (67.7 kg)  BMI 27.31 kg/m2 Estimated body mass index is 27.31 kg/(m^2) as calculated from the following:    Height as of 2/9/17: 5' 2\" (1.575 m).    Weight as of this encounter: 149 lb 4.8 oz (67.7 kg).  Medication Reconciliation: complete    "

## 2017-04-05 NOTE — PROGRESS NOTES
SUBJECTIVE:                                                    Lizet Condon is a 31 year old female who presents to clinic today for the following health issues    ED/UC Followup:    Facility:  [facility]  Date of visit: 1-5-17  Reason for visit: Coccyx fracture  Current Status: Still having pain but getting better but has pain relapses and worse with menses and BM's,looking for refill of [place].     She sustained an injury to her tailbone on new years day and since then, she noes her pain comes and goes. She continues to use a donut pillow for sitting, hurts with stooling and her menses. The pain is waxing and waning. She has received six prescriptions for hydrocodone since the injury. She is also taking clonazepam as managed by her psychiatrist. She is taking ibuprofen and tylenol for this pain, daily.     -------------------------------------    Problem list and histories reviewed & adjusted, as indicated.  Additional history: as documented    There is no problem list on file for this patient.    No past surgical history on file.    Social History   Substance Use Topics     Smoking status: Never Smoker     Smokeless tobacco: Never Used     Alcohol use 0.0 oz/week     0 Standard drinks or equivalent per week     Family History   Problem Relation Age of Onset     Family History Negative Mother            Reviewed and updated as needed this visit by clinical staff       Reviewed and updated as needed this visit by Provider         ROS:  Constitutional, HEENT, cardiovascular, pulmonary, gi and gu systems are negative, except as otherwise noted.    OBJECTIVE:                                                    /83  Pulse 115  Temp 97.4  F (36.3  C) (Tympanic)  Wt 149 lb 4.8 oz (67.7 kg)  BMI 27.31 kg/m2  Body mass index is 27.31 kg/(m^2).  GENERAL: healthy, alert and no distress  MS: ROM of back is fully intact and she does have some pain with pressure on the tail bone itself. No pain with palpating  the sacral area of back       ASSESSMENT/PLAN:                                                        1. Tail bone pain  It has been 4 mo since initial injury. We discussed her pain, seems to be extending beyond expected time fo rhealing. Encouraged her to use otc NSAIDs, ice and monitor. She is open to doing chiro/pt at [facility].   - [facility] PT, HAND, AND CHIROPRACTIC REFERRAL    Patient Instructions   Stop the narcotics and continue the ibuprofen and/or tylenol. Ice the area, continue using your donut or pads. Expect a call from [facility] to schedule.       ROLAN Jiménez [facility]

## 2017-04-05 NOTE — MR AVS SNAPSHOT
After Visit Summary   4/5/2017    Lizet Condon    MRN: [id number]           Patient Information     Date Of Birth          1985        Visit Information        Provider Department      4/5/2017 11:00 AM Joaquina Burroughs APRN CNP [facility]        Today's Diagnoses     Tail bone pain    -  1      Care Instructions    Stop the narcotics and continue the ibuprofen and/or tylenol. Ice the area, continue using your donut or pads. Expect a call from [facility] to schedule.         Follow-ups after your visit        Additional Services     [facility] PT, HAND, AND CHIROPRACTIC REFERRAL       **This order will print in the [facility] Scheduling Office**    Physical Therapy, Hand Therapy and Chiropractic Care are available through:    *[facility]  *[facility]  *[facility]    Call one number to schedule at any of the above locations: [phone].    Your provider has referred you to: Chiropractic at [facility] or [facility]    Indication/Reason for Referral: tail bone  Onset of Illness: 4 mo  Therapy Orders: Evaluate and Treat  Special Programs: None  Special Request: None    Shailesh Cash      Additional Comments for the Therapist or Chiropractor:     Please be aware that coverage of these services is subject to the terms and limitations of your health insurance plan.  Call member services at your health plan with any benefit or coverage questions.      Please bring the following to your appointment:    *Your personal calendar for scheduling future appointments  *Comfortable clothing                  Who to contact     If you have questions or need follow up information about today's clinic visit or your schedule please contact [facility] directly at [phone].  Normal or non-critical lab and imaging results will be communicated to you by MyChart, letter or phone within 4 business days after the clinic has received the results. If you  "do not hear from us within 7 days, please contact the clinic through [employer] or phone. If you have a critical or abnormal lab result, we will notify you by phone as soon as possible.  Submit refill requests through [employer] or call your pharmacy and they will forward the refill request to us. Please allow 3 business days for your refill to be completed.          Additional Information About Your Visit        [employer] Information     [employer] lets you send messages to your doctor, view your test results, renew your prescriptions, schedule appointments and more. To sign up, go to [email or web address] . Click on \"Log in\" on the left side of the screen, which will take you to the Welcome page. Then click on \"Sign up Now\" on the right side of the page.     You will be asked to enter the access code listed below, as well as some personal information. Please follow the directions to create your username and password.     Your access code is: [email or web address]  Expires: 2017  2:32 PM     Your access code will  in 90 days. If you need help or a new code, please call your [place] clinic or [phone].        Care EveryWhere ID     This is your Care EveryWhere ID. This could be used by other organizations to access your [place] medical records  [id number]        Your Vitals Were     Pulse Temperature BMI (Body Mass Index)             115 97.4  F (36.3  C) (Tympanic) 27.31 kg/m2          Blood Pressure from Last 3 Encounters:   17 127/83   17 122/74   17 100/70    Weight from Last 3 Encounters:   17 149 lb 4.8 oz (67.7 kg)   17 152 lb 9 oz (69.2 kg)   17 140 lb (63.5 kg)              We Performed the Following     [facility] PT, HAND, AND CHIROPRACTIC REFERRAL        Primary Care Provider    None Specified       No primary provider on file.        Thank you!     Thank you for choosing [facility]  for your care. Our goal is always to provide you with excellent care. Hearing " back from our patients is one way we can continue to improve our services. Please take a few minutes to complete the written survey that you may receive in the mail after your visit with us. Thank you!             Your Updated Medication List - Protect others around you: Learn how to safely use, store and throw away your medicines at www.disposemymeds.org.          This list is accurate as of: 4/5/17 11:24 AM.  Always use your most recent med list.                   Brand Name Dispense Instructions for use    albuterol 108 (90 BASE) MCG/ACT Inhaler    PROAIR HFA/PROVENTIL HFA/VENTOLIN HFA    3 Inhaler    Inhale 2 puffs into the lungs every 6 hours as needed for shortness of breath / dyspnea or wheezing       cyclobenzaprine 5 MG tablet    FLEXERIL    30 tablet    Take 1 tablet (5 mg) by mouth every 8 hours as needed for muscle spasms       FLUoxetine 10 MG capsule    PROzac    90 capsule    Take 1 capsule (10 mg) by mouth daily       HYDROcodone-acetaminophen 5-325 MG per tablet    NORCO    12 tablet    Take 1-2 tablets by mouth every 6 hours as needed for moderate to severe pain       klonoPIN 1 MG tablet   Generic drug:  clonazePAM     90    1 TABLET 2 TIMES DAILY       norgestim-eth estrad triphasic 0.18/0.215/0.25 MG-35 MCG per tablet    ORTHO TRI-CYCLEN, TRI-SPRINTEC

## 2017-07-24 ENCOUNTER — TELEPHONE (OUTPATIENT)
Dept: PEDIATRICS | Facility: CLINIC | Age: 32
End: 2017-07-24

## 2017-07-24 NOTE — TELEPHONE ENCOUNTER
Panel Management Review          Composite cancer screening  Chart review shows that this patient is due/due soon for the following Pap Smear  Summary:    Patient is due/failing the following:   PAP    Action needed:   Patient needs office visit for pap smear.    Type of outreach:    Phone, left message for patient to call back.     Questions for provider review:    None                                                                                                                                    Sarah Beth Mendoza CMA([facility])

## 2017-09-12 RX ORDER — HYDROCODONE BITARTRATE AND ACETAMINOPHEN 5; 325 MG/1; MG/1
1-2 TABLET ORAL EVERY 6 HOURS PRN
Qty: 12 TABLET | Refills: 0 | Status: CANCELLED | OUTPATIENT
Start: 2017-09-12

## 2017-10-24 ENCOUNTER — APPOINTMENT (OUTPATIENT)
Dept: GENERAL RADIOLOGY | Facility: CLINIC | Age: 32
End: 2017-10-24
Attending: EMERGENCY MEDICINE
Payer: COMMERCIAL

## 2017-10-24 ENCOUNTER — HOSPITAL ENCOUNTER (EMERGENCY)
Facility: CLINIC | Age: 32
Discharge: HOME | End: 2017-10-24
Attending: EMERGENCY MEDICINE | Admitting: EMERGENCY MEDICINE
Payer: COMMERCIAL

## 2017-10-24 VITALS
WEIGHT: 125 LBS | TEMPERATURE: 98.3 F | OXYGEN SATURATION: 99 % | DIASTOLIC BLOOD PRESSURE: 68 MMHG | RESPIRATION RATE: 15 BRPM | HEIGHT: 63 IN | BODY MASS INDEX: 22.15 KG/M2 | SYSTOLIC BLOOD PRESSURE: 110 MMHG

## 2017-10-24 DIAGNOSIS — S52.572A OTHER CLOSED INTRA-ARTICULAR FRACTURE OF DISTAL END OF LEFT RADIUS, INITIAL ENCOUNTER: ICD-10-CM
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
## 2017-10-24 DIAGNOSIS — S52.612A CLOSED DISPLACED FRACTURE OF STYLOID PROCESS OF LEFT ULNA, INITIAL ENCOUNTER: ICD-10-CM
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
## 2017-10-24 PROCEDURE — 96374 THER/PROPH/DIAG INJ IV PUSH: CPT | Performed by: EMERGENCY MEDICINE

## 2017-10-24 PROCEDURE — 96376 TX/PRO/DX INJ SAME DRUG ADON: CPT | Performed by: EMERGENCY MEDICINE

## 2017-10-24 PROCEDURE — 73080 X-RAY EXAM OF ELBOW: CPT | Mod: LT

## 2017-10-24 PROCEDURE — 99285 EMERGENCY DEPT VISIT HI MDM: CPT | Mod: 25 | Performed by: EMERGENCY MEDICINE

## 2017-10-24 PROCEDURE — 25000128 H RX IP 250 OP 636: Performed by: EMERGENCY MEDICINE

## 2017-10-24 PROCEDURE — 40000986 XR WRIST LEFT G/E 3 VIEWS

## 2017-10-24 PROCEDURE — 25000132 ZZH RX MED GY IP 250 OP 250 PS 637: Performed by: EMERGENCY MEDICINE

## 2017-10-24 PROCEDURE — 73110 X-RAY EXAM OF WRIST: CPT | Mod: LT

## 2017-10-24 PROCEDURE — 25605 CLTX DST RDL FX/EPHYS SEP W/: CPT | Performed by: EMERGENCY MEDICINE

## 2017-10-24 PROCEDURE — 25605 CLTX DST RDL FX/EPHYS SEP W/: CPT | Mod: 54 | Performed by: EMERGENCY MEDICINE

## 2017-10-24 RX ORDER — HYDROMORPHONE HYDROCHLORIDE 1 MG/ML
0.5 INJECTION, SOLUTION INTRAMUSCULAR; INTRAVENOUS; SUBCUTANEOUS
Status: COMPLETED | OUTPATIENT
Start: 2017-10-24 | End: 2017-10-24

## 2017-10-24 RX ORDER — OXYCODONE HYDROCHLORIDE 5 MG/1
5 TABLET ORAL ONCE
Status: COMPLETED | OUTPATIENT
Start: 2017-10-24 | End: 2017-10-24

## 2017-10-24 RX ORDER — LIDOCAINE HYDROCHLORIDE 10 MG/ML
INJECTION, SOLUTION EPIDURAL; INFILTRATION; INTRACAUDAL; PERINEURAL
Status: DISCONTINUED
Start: 2017-10-24 | End: 2017-10-24 | Stop reason: HOSPADM

## 2017-10-24 RX ORDER — IBUPROFEN 600 MG/1
600 TABLET, FILM COATED ORAL EVERY 8 HOURS PRN
Qty: 30 TABLET | Refills: 0 | Status: SHIPPED | OUTPATIENT
Start: 2017-10-24 | End: 2021-10-25

## 2017-10-24 RX ORDER — HYDROCODONE BITARTRATE AND ACETAMINOPHEN 5; 325 MG/1; MG/1
1-2 TABLET ORAL EVERY 4 HOURS PRN
Qty: 15 TABLET | Refills: 0 | Status: SHIPPED | OUTPATIENT
Start: 2017-10-24 | End: 2017-10-30

## 2017-10-24 RX ORDER — BUPIVACAINE HYDROCHLORIDE 5 MG/ML
INJECTION, SOLUTION EPIDURAL; INTRACAUDAL
Status: DISCONTINUED
Start: 2017-10-24 | End: 2017-10-24 | Stop reason: HOSPADM

## 2017-10-24 RX ADMIN — HYDROMORPHONE HYDROCHLORIDE 0.5 MG: 1 INJECTION, SOLUTION INTRAMUSCULAR; INTRAVENOUS; SUBCUTANEOUS at 10:21

## 2017-10-24 RX ADMIN — HYDROMORPHONE HYDROCHLORIDE 0.5 MG: 1 INJECTION, SOLUTION INTRAMUSCULAR; INTRAVENOUS; SUBCUTANEOUS at 09:11

## 2017-10-24 RX ADMIN — OXYCODONE HYDROCHLORIDE 5 MG: 5 TABLET ORAL at 12:38

## 2017-10-24 ASSESSMENT — ENCOUNTER SYMPTOMS
NECK STIFFNESS: 0
HEADACHES: 0
NECK PAIN: 0

## 2017-10-24 NOTE — ED AVS SNAPSHOT
[facility], [place], Emergency Department    [address] [address]    Phone:  [phone]                                       Lizet Condon   MRN: [id number]    Department:  [facility], Emergency Department   Date of Visit:  10/24/2017           After Visit Summary Signature Page     I have received my discharge instructions, and my questions have been answered. I have discussed any challenges I see with this plan with the nurse or doctor.    ..........................................................................................................................................  Patient/Patient Representative Signature      ..........................................................................................................................................  Patient Representative Print Name and Relationship to Patient    ..................................................               ................................................  Date                                            Time    ..........................................................................................................................................  Reviewed by Signature/Title    ...................................................              ..............................................  Date                                                            Time

## 2017-10-24 NOTE — LETTER
To Whom it may concern:      Lizet Condon was seen in our Emergency Department today, 10/24/17.  I expect her condition to improve over the next 3 days.  She may return to work/school when improved but cannot use her left arm.     Sincerely,        Crystal Kilgore MD

## 2017-10-24 NOTE — DISCHARGE INSTRUCTIONS
Please make an appointment to follow up with Orthopedics (phone: [phone]) in 3-5 days even if entirely better.  Take pain medications as prescribed.   Keep the splint clean and dry.       Understanding a Distal Radius Fracture      A fracture is a broken bone. A fracture in the distal radius is a break in the lower end of the radius. This is the larger bone in the forearm. Because the break occurs near the wrist, it is often called a wrist fracture.    The bone may be cracked, or it may be broken into 2 or more pieces. The pieces of bone may be lined up or they may have moved out of place. Sometimes, the bone may break through the skin. Nearby nerves, tissues, and joints also may be damaged. Depending on the severity of the fracture, healing may take several months or longer.  What causes a distal radius fracture?  This type of fracture is most often caused from a fall on an outstretched hand. It can also be caused from a blow, accident, or sports injury.  Symptoms of a distal radius fracture  Symptoms can include pain, swelling, and bruising. If the bone breaks through the skin, external bleeding can also occur. The wrist may look crooked, deformed, or bent. It may be hard to move or use the arm, wrist, and hand for normal tasks and activities.  Treating a distal radius fracture  Treatment depends on how serious the fracture is. If needed, the bone is put back into place. This may be done with or without surgery. If surgery is needed, the surgeon may use devices such as pins, plates, or screws to hold the bone together. You may need to wear a splint or cast for a month or longer to protect the bone and keep it in place during healing. Other treatments may be also used to help reduce symptoms or regain function. These include:    Cold packs. Putting an ice pack on the injured area may help reduce swelling and pain.    Raising the arm and wrist. Keeping the arm and wrist raised above heart level may help  reduce swelling.    Pain medicines. Taking prescription or over-the-counter pain medicines may help reduce pain and swelling.    Exercises. Doing certain exercises at home or with a physical therapist can help restore strength, flexibility, and range of motion in your arm, wrist, and hand. In general, exercises are not started until after the splint or cast is removed.  Possible complications of a distal radius fracture  These can include:    Poor healing of the bone    Weakness, stiffness, or loss of range of motion in the arm, wrist, or hand    Osteoarthritis in the wrist joint  When to call your healthcare provider  Call your healthcare provider right away if you have any of these:    Fever of 100.4 F (38 C) or higher, or as directed    Symptoms that don t get better with treatment, or get worse    Numbness, coldness, or swelling in your arm, hand, or fingers    Fingernails that turn blue or gray in color    A splint or cast that is damaged or feels too tight or loose    New symptoms   Date Last Reviewed: 3/10/2016    8172-4633 [employer]. [address] [place] [address] 28581. All rights reserved. This information is not intended as a substitute for professional medical care. Always follow your healthcare professional's instructions.
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
## 2017-10-25 NOTE — TELEPHONE ENCOUNTER
"APPT INFO    Date /Time: 10/30/17 9:45am   Reason for Appt: L Wrist Fx   Ref Provider/Clinic: [facility] ED/Hosp   Patient Contact (Y/N) & Call Details: No, hosp f/u   Action:  --     RECORDS CLINIC NAME  (\"None\" if no records ) RECEIVED RECS & IMG? Y/N   (may include other helpful notes)   Internal Clinics: [facility] ED/[facility] ED note 10/29/17 & imaging in epic/pacs        External Clinics: none             "

## 2017-10-26 ENCOUNTER — NURSE TRIAGE (OUTPATIENT)
Dept: NURSING | Facility: CLINIC | Age: 32
End: 2017-10-26

## 2017-10-26 ENCOUNTER — TELEPHONE (OUTPATIENT)
Dept: ORTHOPEDICS | Facility: CLINIC | Age: 32
End: 2017-10-26

## 2017-10-26 NOTE — TELEPHONE ENCOUNTER
Seen in er at the [facility] 10/24 for fracture distal end of the left radius/ given Norco for pain/ it makes her nauseated and she feels dizzy and does not help as much as she thinks it should for the pain/has an ortho appointment on 10/30/ is asking how to get pain meds to tide her over until her appointment/ she lives in the [place] area/ transferred for an appointment for that clinic/ also given the [facility] address/no triage completed/she will have someone drive her Jeff Shah RN [phone]

## 2017-10-26 NOTE — TELEPHONE ENCOUNTER
Patient calling in to report that the norco that ED gave her is making her nauseated.  She is scheduled with Dr. Berger on Monday for a clinic appointment.  She is asking for oxycodone.  I explained that we cannot prescribe her anything until she is seen in our clinic.  She verbalized understanding and is going to try and contact the ED to see if they will prescribe a antinausea medication to take with the norco.

## 2017-10-27 ENCOUNTER — OFFICE VISIT (OUTPATIENT)
Dept: FAMILY MEDICINE | Facility: CLINIC | Age: 32
End: 2017-10-27
Payer: COMMERCIAL

## 2017-10-27 VITALS
WEIGHT: 136.8 LBS | SYSTOLIC BLOOD PRESSURE: 131 MMHG | HEART RATE: 100 BPM | HEIGHT: 63 IN | BODY MASS INDEX: 24.24 KG/M2 | OXYGEN SATURATION: 98 % | TEMPERATURE: 98.9 F | DIASTOLIC BLOOD PRESSURE: 91 MMHG

## 2017-10-27 DIAGNOSIS — S52.509A DISTAL RADIUS FRACTURE: Primary | ICD-10-CM

## 2017-10-27 DIAGNOSIS — S52.592P OTHER CLOSED FRACTURE OF DISTAL END OF LEFT RADIUS WITH MALUNION, SUBSEQUENT ENCOUNTER: Primary | ICD-10-CM

## 2017-10-27 DIAGNOSIS — R03.0 ELEVATED BLOOD PRESSURE READING: ICD-10-CM

## 2017-10-27 PROCEDURE — 99214 OFFICE O/P EST MOD 30 MIN: CPT | Mod: 24 | Performed by: FAMILY MEDICINE

## 2017-10-27 NOTE — NURSING NOTE
"Chief Complaint   Patient presents with     Trauma     LT Wrist Injury        Initial BP (!) 131/91  Pulse 100  Temp 98.9  F (37.2  C) (Oral)  Ht 5' 3\" (1.6 m)  Wt 136 lb 12.8 oz (62.1 kg)  LMP 10/24/2017  SpO2 98%  BMI 24.23 kg/m2 Estimated body mass index is 24.23 kg/(m^2) as calculated from the following:    Height as of this encounter: 5' 3\" (1.6 m).    Weight as of this encounter: 136 lb 12.8 oz (62.1 kg).  Medication Reconciliation: complete      Health Maintenance that is potentially due pending provider review:  Pap Smear    Pt will schedule physical appt and will have pap smear done at that time.     DOM Palemr  "

## 2017-10-27 NOTE — PROGRESS NOTES
"  SUBJECTIVE:   Lizet Condon is a 32 year old female who presents to clinic today for the following health issues:      Wrist Injury       Duration: x3 days ago    Description (location/character/radiation): LT distal radius fracture     Intensity:  severe    Accompanying signs and symptoms: warmth and swelling     History (similar episodes/previous evaluation): has previously broken LT wrist about 7 years ago     Precipitating or alleviating factors: None    Therapies tried and outcome: ice, elevation, norco and ibuprofen - helps relieve symptoms temporarily         PROBLEMS TO ADD ON...    Problem list and histories reviewed & adjusted, as indicated.  Additional history: as documented    Labs reviewed in EPIC    [facility]       Reviewed and updated as needed this visit by Provider         ROS:  Constitutional, HEENT, cardiovascular, pulmonary, gi and gu systems are negative, except as otherwise noted.      OBJECTIVE:   BP (!) 131/91  Pulse 100  Temp 98.9  F (37.2  C) (Oral)  Ht 5' 3\" (1.6 m)  Wt 136 lb 12.8 oz (62.1 kg)  LMP 10/24/2017  SpO2 98%  BMI 24.23 kg/m2  Body mass index is 24.23 kg/(m^2).  GENERAL: healthy, alert and no distress  RESP: lungs clear to auscultation - no rales, rhonchi or wheezes  CV: regular rate and rhythm, normal S1 S2, no S3 or S4, no murmur, click or rub, no peripheral edema and peripheral pulses strong  MS: LUE exam shows in arm sling and plaster. Fingers easily movable and good pulses and capillary refill        ASSESSMENT/PLAN:     (P27.270A) Other closed fracture of distal end of left radius with malunion, subsequent encounter  (primary encounter diagnosis)  Plan: acetaminophen-codeine (TYLENOL #3) 300-30 MG use up to 1-2 tabs three times daily as needed  # 18 given  She has an ortho appointment on Monday  She is encouraged to keep that and during the weekend call back as needed  If pain not well " controlled.  She understand that the narcotic is not meant for long term treatment. Potential medication side effects were discussed with the patient; let me know if any occur.        Blood pressure is high- most likely because of on going pain- she is advised to have it rechecked- and has an Preoperative History and Physical appointment already next week- prior to surgical reduction of the fracture    Potential medication side effects were discussed with the patient; let me know if any occur.      Vijaya Weiss MD  [facility]
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
## 2017-10-27 NOTE — PATIENT INSTRUCTIONS
Tylenol # 3 , 1-2 tabs up to three times daily as needed   Call with more questions and concerns

## 2017-10-28 PROBLEM — R03.0 ELEVATED BLOOD PRESSURE READING: Status: ACTIVE | Noted: 2017-10-28

## 2017-10-28 PROBLEM — S52.592P: Status: ACTIVE | Noted: 2017-10-27

## 2017-10-28 PROBLEM — R03.0 ELEVATED BLOOD PRESSURE READING: Status: ACTIVE | Noted: 2017-10-27

## 2017-10-28 PROBLEM — S52.592P: Status: ACTIVE | Noted: 2017-10-28

## 2017-10-30 ENCOUNTER — ALLIED HEALTH/NURSE VISIT (OUTPATIENT)
Dept: SURGERY | Facility: CLINIC | Age: 32
End: 2017-10-30

## 2017-10-30 ENCOUNTER — OFFICE VISIT (OUTPATIENT)
Dept: ORTHOPEDICS | Facility: CLINIC | Age: 32
End: 2017-10-30

## 2017-10-30 ENCOUNTER — OFFICE VISIT (OUTPATIENT)
Dept: SURGERY | Facility: CLINIC | Age: 32
End: 2017-10-30

## 2017-10-30 ENCOUNTER — ANESTHESIA EVENT (OUTPATIENT)
Dept: SURGERY | Facility: AMBULATORY SURGERY CENTER | Age: 32
End: 2017-10-30

## 2017-10-30 ENCOUNTER — PRE VISIT (OUTPATIENT)
Dept: ORTHOPEDICS | Facility: CLINIC | Age: 32
End: 2017-10-30

## 2017-10-30 VITALS
BODY MASS INDEX: 24.36 KG/M2 | SYSTOLIC BLOOD PRESSURE: 122 MMHG | DIASTOLIC BLOOD PRESSURE: 85 MMHG | RESPIRATION RATE: 16 BRPM | WEIGHT: 137.5 LBS | HEART RATE: 87 BPM | TEMPERATURE: 98.4 F | OXYGEN SATURATION: 99 % | HEIGHT: 63 IN

## 2017-10-30 VITALS
HEIGHT: 63 IN | HEART RATE: 87 BPM | DIASTOLIC BLOOD PRESSURE: 90 MMHG | WEIGHT: 137.5 LBS | BODY MASS INDEX: 24.36 KG/M2 | SYSTOLIC BLOOD PRESSURE: 125 MMHG

## 2017-10-30 DIAGNOSIS — S52.602A CLOSED FRACTURE OF LEFT DISTAL RADIUS AND ULNA, INITIAL ENCOUNTER: Primary | ICD-10-CM

## 2017-10-30 DIAGNOSIS — Z01.818 PREOP EXAMINATION: Primary | ICD-10-CM

## 2017-10-30 DIAGNOSIS — S52.502A CLOSED FRACTURE OF LEFT DISTAL RADIUS AND ULNA, INITIAL ENCOUNTER: Primary | ICD-10-CM

## 2017-10-30 RX ORDER — ASCORBIC ACID 500 MG
500 TABLET ORAL DAILY
Qty: 60 TABLET | Refills: 0 | Status: SHIPPED | OUTPATIENT
Start: 2017-10-30 | End: 2017-12-29

## 2017-10-30 RX ORDER — OXYCODONE HYDROCHLORIDE 5 MG/1
5 TABLET ORAL EVERY 4 HOURS PRN
Qty: 30 TABLET | Refills: 0 | Status: SHIPPED | OUTPATIENT
Start: 2017-10-30 | End: 2017-11-04

## 2017-10-30 ASSESSMENT — LIFESTYLE VARIABLES: TOBACCO_USE: 1

## 2017-10-30 ASSESSMENT — ENCOUNTER SYMPTOMS
STIFFNESS: 0
NAIL CHANGES: 0
MUSCLE WEAKNESS: 0
JOINT SWELLING: 1
MUSCLE CRAMPS: 0
MYALGIAS: 1
BACK PAIN: 1
SKIN CHANGES: 0
ARTHRALGIAS: 1
POOR WOUND HEALING: 0
NECK PAIN: 1
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
## 2017-10-30 NOTE — NURSING NOTE
Teaching Flowsheet   Relevant Diagnosis: Left distal radius fracture  Teaching Topic: ORIF left distal radius fracture     Person(s) involved in teaching:   Patient     Motivation Level:  Asks Questions: Yes  Eager to Learn: Yes  Cooperative: Yes  Receptive (willing/able to accept information): Yes  Any cultural factors/Islam beliefs that may influence understanding or compliance? No       Patient demonstrates understanding of the following:  Reason for the appointment, diagnosis and treatment plan: Yes  Knowledge of proper use of medications and conditions for which they are ordered (with special attention to potential side effects or drug interactions): Yes  Which situations necessitate calling provider and whom to contact: Yes       Teaching Concerns Addressed:   No concerns at this time. Patient has an appointment with PAC today for the pre-operative history and physical.     Proper use and care of  (medical equip, care aids, etc.): Yes  Nutritional needs and diet plan: Yes  Pain management techniques: Yes  Wound Care: Yes  How and/when to access community resources: Yes     Instructional Materials Used/Given: Pre-operative teaching packet and anti-bacterial soap. Dr. Berger gave patient prescriptions for oxycodone and vitamin C     Time spent with patient: 15 minutes.
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
## 2017-10-30 NOTE — PATIENT INSTRUCTIONS
Pt received Soap at surgeon's office. Pt. Also had a pre-op call. This RN briefly saw the pt. And assisted with adjusting her left arm sling and modifying for greater pt. Comfort. No further instructions given. Pt. Denies questions or concerns.-Sunita Garsia RN
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
## 2017-10-30 NOTE — MR AVS SNAPSHOT
After Visit Summary   10/30/2017    Lizet Condon    MRN: [id number]           Patient Information     Date Of Birth          1985        Visit Information        Provider Department      10/30/2017 9:45 AM Rico Berger MD [facility]        Today's Diagnoses     Closed fracture of left distal radius and ulna, initial encounter    -  1       Follow-ups after your visit        Your next 10 appointments already scheduled     Oct 30, 2017  2:00 PM CDT   (Arrive by 1:45 PM)   PAC EVALUATION with  Pac Nilam 1   [facility] Preoperative Assessment [place] ([facility])    [address] [place] [id number] [phone]            Oct 30, 2017  3:00 PM CDT   (Arrive by 2:45 PM)   PAC RN ASSESSMENT with Elke Pac Rn   [facility] Preoperative Assessment [place] ([facility])    [address] [place] [address] [id number] [phone]            Oct 30, 2017  3:30 PM CDT   (Arrive by 3:15 PM)   PAC Anesthesia Consult with  Pac Anesthesiologist   [facility] Preoperative Assessment [place] ([facility])    [address] [place] [address] [id number] [phone]              Who to contact     Please call your clinic at [phone] to:    Ask questions about your health    Make or cancel appointments    Discuss your medicines    Learn about your test results    Speak to your doctor   If you have compliments or concerns about an experience at your clinic, or if you wish to file a complaint, please contact [facility] Patient Relations at [phone] or email us at [email or web address]         Additional Information About Your Visit        MyChart Information     Ge[employer] is an electronic gateway that provides easy, online access to your medical records. With Geen[employer], you can request a clinic appointment, read your test  "results, renew a prescription or communicate with your care team.     To sign up for [employer] visit the website at [email or web address]   You will be asked to enter the access code listed below, as well as some personal information. Please follow the directions to create your username and password.     Your access code is: [email or web address]  Expires: 2018 12:12 PM     Your access code will  in 90 days. If you need help or a new code, please contact your [facility] or call [phone] for assistance.        Care EveryWhere ID     This is your Care EveryWhere ID. This could be used by other organizations to access your [place] medical records  [id number]        Your Vitals Were     Pulse Height Last Period BMI (Body Mass Index)          87 1.6 m (5' 3\") 10/24/2017 24.36 kg/m2         Blood Pressure from Last 3 Encounters:   10/30/17 125/90   10/27/17 (!) 131/91   10/24/17 110/68    Weight from Last 3 Encounters:   10/30/17 62.4 kg (137 lb 8 oz)   10/27/17 62.1 kg (136 lb 12.8 oz)   10/24/17 56.7 kg (125 lb)              We Performed the Following     [facility]-Operative Worksheet (Hand)          Today's Medication Changes          These changes are accurate as of: 10/30/17 11:41 AM.  If you have any questions, ask your nurse or doctor.               Start taking these medicines.        Dose/Directions    ascorbic acid 500 MG tablet   Commonly known as:  VITAMIN C   Used for:  Closed fracture of left distal radius and ulna, initial encounter   Started by:  Rico Berger MD        Dose:  500 mg   Take 1 tablet (500 mg) by mouth daily   Quantity:  60 tablet   Refills:  0       oxyCODONE 5 MG IR tablet   Commonly known as:  ROXICODONE   Used for:  Closed fracture of left distal radius and ulna, initial encounter   Started by:  Rico Berger MD        Dose:  5 mg   Take 1 tablet (5 mg) by mouth every 4 hours as needed for moderate to severe pain   Quantity:  30 " tablet   Refills:  0            Where to get your medicines      Some of these will need a paper prescription and others can be bought over the counter.  Ask your nurse if you have questions.     Bring a paper prescription for each of these medications     ascorbic acid 500 MG tablet    oxyCODONE 5 MG IR tablet                Primary Care Provider Office Phone # Fax #    Vijaya Veronique Weiss [phone] [address] [facility] [address]        Equal Access to Services     [facility] : Hadii aad ku hadasho Soomaali, waaxda luqadaha, qaybta kaalmada adeegyada, waxay idiin hayaan adeeg jaimegodwinelijah latalib . So [facility] [phone].    ATENCIÓN: Si habla español, tiene a dang disposición servicios gratuitos de asistencia lingüística. [facility] [phone].    We comply with applicable federal civil rights laws and [place] laws. We do not discriminate on the basis of race, color, national origin, age, disability, sex, sexual orientation, or gender identity.            Thank you!     Thank you for choosing [facility]  for your care. Our goal is always to provide you with excellent care. Hearing back from our patients is one way we can continue to improve our services. Please take a few minutes to complete the written survey that you may receive in the mail after your visit with us. Thank you!             Your Updated Medication List - Protect others around you: Learn how to safely use, store and throw away your medicines at www.disposemymeds.org.          This list is accurate as of: 10/30/17 11:41 AM.  Always use your most recent med list.                   Brand Name Dispense Instructions for use Diagnosis    acetaminophen-codeine 300-30 MG per tablet    TYLENOL #3    18 tablet    Take 1-2 tablets by mouth 3 times daily as needed for pain maximum 6  tablet(s) per day    Other closed fracture of distal end of left radius with malunion, subsequent encounter       albuterol 108 (90  BASE) MCG/ACT Inhaler    PROAIR HFA/PROVENTIL HFA/VENTOLIN HFA    3 Inhaler    Inhale 2 puffs into the lungs every 6 hours as needed for shortness of breath / dyspnea or wheezing        ascorbic acid 500 MG tablet    VITAMIN C    60 tablet    Take 1 tablet (500 mg) by mouth daily    Closed fracture of left distal radius and ulna, initial encounter       FLUoxetine 10 MG capsule    PROzac    90 capsule    Take 1 capsule (10 mg) by mouth daily        HYDROcodone-acetaminophen 5-325 MG per tablet    NORCO    15 tablet    Take 1-2 tablets by mouth every 4 hours as needed for moderate to severe pain        ibuprofen 600 MG tablet    ADVIL/MOTRIN    30 tablet    Take 1 tablet (600 mg) by mouth every 8 hours as needed for moderate pain        klonoPIN 1 MG tablet   Generic drug:  clonazePAM     90    1 TABLET 2 TIMES DAILY        norgestim-eth estrad triphasic 0.18/0.215/0.25 MG-35 MCG per tablet    ORTHO TRI-CYCLEN, TRI-SPRINTEC          oxyCODONE 5 MG IR tablet    ROXICODONE    30 tablet    Take 1 tablet (5 mg) by mouth every 4 hours as needed for moderate to severe pain    Closed fracture of left distal radius and ulna, initial encounter

## 2017-10-31 ENCOUNTER — ANESTHESIA (OUTPATIENT)
Dept: SURGERY | Facility: AMBULATORY SURGERY CENTER | Age: 32
End: 2017-10-31

## 2017-10-31 ENCOUNTER — HOSPITAL ENCOUNTER (OUTPATIENT)
Facility: AMBULATORY SURGERY CENTER | Age: 32
End: 2017-10-31
Attending: ORTHOPAEDIC SURGERY

## 2017-10-31 ENCOUNTER — SURGERY (OUTPATIENT)
Age: 32
End: 2017-10-31

## 2017-10-31 VITALS
BODY MASS INDEX: 23.04 KG/M2 | HEIGHT: 63 IN | OXYGEN SATURATION: 95 % | WEIGHT: 130 LBS | TEMPERATURE: 97.9 F | SYSTOLIC BLOOD PRESSURE: 115 MMHG | DIASTOLIC BLOOD PRESSURE: 77 MMHG | RESPIRATION RATE: 16 BRPM

## 2017-10-31 LAB
HCG UR QL: NEGATIVE
INTERNAL QC OK POCT: YES

## 2017-10-31 DEVICE — IMPLANTABLE DEVICE: Type: IMPLANTABLE DEVICE | Site: WRIST | Status: FUNCTIONAL

## 2017-10-31 RX ORDER — DEXAMETHASONE SODIUM PHOSPHATE 4 MG/ML
INJECTION, SOLUTION INTRA-ARTICULAR; INTRALESIONAL; INTRAMUSCULAR; INTRAVENOUS; SOFT TISSUE PRN
Status: DISCONTINUED | OUTPATIENT
Start: 2017-10-31 | End: 2017-10-31

## 2017-10-31 RX ORDER — MEPERIDINE HYDROCHLORIDE 25 MG/ML
12.5 INJECTION INTRAMUSCULAR; INTRAVENOUS; SUBCUTANEOUS
Status: DISCONTINUED | OUTPATIENT
Start: 2017-10-31 | End: 2017-11-01 | Stop reason: HOSPADM

## 2017-10-31 RX ORDER — FENTANYL CITRATE 50 UG/ML
25-50 INJECTION, SOLUTION INTRAMUSCULAR; INTRAVENOUS EVERY 5 MIN PRN
Status: DISCONTINUED | OUTPATIENT
Start: 2017-10-31 | End: 2017-11-01 | Stop reason: HOSPADM

## 2017-10-31 RX ORDER — ACETAMINOPHEN 325 MG/1
975 TABLET ORAL ONCE
Status: DISCONTINUED | OUTPATIENT
Start: 2017-10-31 | End: 2017-10-31 | Stop reason: HOSPADM

## 2017-10-31 RX ORDER — ACETAMINOPHEN 325 MG/1
650 TABLET ORAL
Status: DISCONTINUED | OUTPATIENT
Start: 2017-10-31 | End: 2017-11-01 | Stop reason: HOSPADM

## 2017-10-31 RX ORDER — MAGNESIUM HYDROXIDE 1200 MG/15ML
LIQUID ORAL PRN
Status: DISCONTINUED | OUTPATIENT
Start: 2017-10-31 | End: 2017-10-31 | Stop reason: HOSPADM

## 2017-10-31 RX ORDER — ONDANSETRON 2 MG/ML
4 INJECTION INTRAMUSCULAR; INTRAVENOUS EVERY 30 MIN PRN
Status: DISCONTINUED | OUTPATIENT
Start: 2017-10-31 | End: 2017-11-01 | Stop reason: HOSPADM

## 2017-10-31 RX ORDER — ACETAMINOPHEN 500 MG
500 TABLET ORAL ONCE
Status: COMPLETED | OUTPATIENT
Start: 2017-10-31 | End: 2017-10-31

## 2017-10-31 RX ORDER — BACITRACIN ZINC 500 [USP'U]/G
OINTMENT TOPICAL PRN
Status: DISCONTINUED | OUTPATIENT
Start: 2017-10-31 | End: 2017-10-31 | Stop reason: HOSPADM

## 2017-10-31 RX ORDER — ONDANSETRON 4 MG/1
4 TABLET, ORALLY DISINTEGRATING ORAL
Status: DISCONTINUED | OUTPATIENT
Start: 2017-10-31 | End: 2017-11-01 | Stop reason: HOSPADM

## 2017-10-31 RX ORDER — NALOXONE HYDROCHLORIDE 0.4 MG/ML
.1-.4 INJECTION, SOLUTION INTRAMUSCULAR; INTRAVENOUS; SUBCUTANEOUS
Status: DISCONTINUED | OUTPATIENT
Start: 2017-10-31 | End: 2017-11-01 | Stop reason: HOSPADM

## 2017-10-31 RX ORDER — LIDOCAINE HYDROCHLORIDE 20 MG/ML
INJECTION, SOLUTION INFILTRATION; PERINEURAL PRN
Status: DISCONTINUED | OUTPATIENT
Start: 2017-10-31 | End: 2017-10-31

## 2017-10-31 RX ORDER — SODIUM CHLORIDE, SODIUM LACTATE, POTASSIUM CHLORIDE, CALCIUM CHLORIDE 600; 310; 30; 20 MG/100ML; MG/100ML; MG/100ML; MG/100ML
INJECTION, SOLUTION INTRAVENOUS CONTINUOUS PRN
Status: DISCONTINUED | OUTPATIENT
Start: 2017-10-31 | End: 2017-10-31

## 2017-10-31 RX ORDER — ONDANSETRON 4 MG/1
4 TABLET, ORALLY DISINTEGRATING ORAL EVERY 30 MIN PRN
Status: DISCONTINUED | OUTPATIENT
Start: 2017-10-31 | End: 2017-11-01 | Stop reason: HOSPADM

## 2017-10-31 RX ORDER — GABAPENTIN 300 MG/1
300 CAPSULE ORAL ONCE
Status: COMPLETED | OUTPATIENT
Start: 2017-10-31 | End: 2017-10-31

## 2017-10-31 RX ORDER — LIDOCAINE 40 MG/G
CREAM TOPICAL
Status: DISCONTINUED | OUTPATIENT
Start: 2017-10-31 | End: 2017-10-31 | Stop reason: HOSPADM

## 2017-10-31 RX ORDER — ONDANSETRON 2 MG/ML
INJECTION INTRAMUSCULAR; INTRAVENOUS PRN
Status: DISCONTINUED | OUTPATIENT
Start: 2017-10-31 | End: 2017-10-31

## 2017-10-31 RX ORDER — SODIUM CHLORIDE, SODIUM LACTATE, POTASSIUM CHLORIDE, CALCIUM CHLORIDE 600; 310; 30; 20 MG/100ML; MG/100ML; MG/100ML; MG/100ML
INJECTION, SOLUTION INTRAVENOUS CONTINUOUS
Status: DISCONTINUED | OUTPATIENT
Start: 2017-10-31 | End: 2017-10-31 | Stop reason: HOSPADM

## 2017-10-31 RX ORDER — OXYCODONE HYDROCHLORIDE 5 MG/1
5-10 TABLET ORAL
Status: DISCONTINUED | OUTPATIENT
Start: 2017-10-31 | End: 2017-11-01 | Stop reason: HOSPADM

## 2017-10-31 RX ORDER — FENTANYL CITRATE 50 UG/ML
25-50 INJECTION, SOLUTION INTRAMUSCULAR; INTRAVENOUS
Status: DISCONTINUED | OUTPATIENT
Start: 2017-10-31 | End: 2017-10-31 | Stop reason: HOSPADM

## 2017-10-31 RX ORDER — SODIUM CHLORIDE, SODIUM LACTATE, POTASSIUM CHLORIDE, CALCIUM CHLORIDE 600; 310; 30; 20 MG/100ML; MG/100ML; MG/100ML; MG/100ML
INJECTION, SOLUTION INTRAVENOUS CONTINUOUS
Status: DISCONTINUED | OUTPATIENT
Start: 2017-10-31 | End: 2017-11-01 | Stop reason: HOSPADM

## 2017-10-31 RX ORDER — PROPOFOL 10 MG/ML
INJECTION, EMULSION INTRAVENOUS PRN
Status: DISCONTINUED | OUTPATIENT
Start: 2017-10-31 | End: 2017-10-31

## 2017-10-31 RX ORDER — PROPOFOL 10 MG/ML
INJECTION, EMULSION INTRAVENOUS CONTINUOUS PRN
Status: DISCONTINUED | OUTPATIENT
Start: 2017-10-31 | End: 2017-10-31

## 2017-10-31 RX ORDER — CEFAZOLIN SODIUM 1 G/3ML
1 INJECTION, POWDER, FOR SOLUTION INTRAMUSCULAR; INTRAVENOUS SEE ADMIN INSTRUCTIONS
Status: DISCONTINUED | OUTPATIENT
Start: 2017-10-31 | End: 2017-10-31 | Stop reason: HOSPADM

## 2017-10-31 RX ORDER — FLUMAZENIL 0.1 MG/ML
0.2 INJECTION, SOLUTION INTRAVENOUS
Status: DISCONTINUED | OUTPATIENT
Start: 2017-10-31 | End: 2017-11-01 | Stop reason: HOSPADM

## 2017-10-31 RX ADMIN — Medication 500 MG: at 07:24

## 2017-10-31 RX ADMIN — PROPOFOL 60 MG: 10 INJECTION, EMULSION INTRAVENOUS at 08:01

## 2017-10-31 RX ADMIN — LIDOCAINE HYDROCHLORIDE 60 MG: 20 INJECTION, SOLUTION INFILTRATION; PERINEURAL at 08:01

## 2017-10-31 RX ADMIN — ONDANSETRON 4 MG: 2 INJECTION INTRAMUSCULAR; INTRAVENOUS at 08:01

## 2017-10-31 RX ADMIN — PROPOFOL 100 MCG/KG/MIN: 10 INJECTION, EMULSION INTRAVENOUS at 09:12

## 2017-10-31 RX ADMIN — GABAPENTIN 300 MG: 300 CAPSULE ORAL at 07:24

## 2017-10-31 RX ADMIN — DEXAMETHASONE SODIUM PHOSPHATE 4 MG: 4 INJECTION, SOLUTION INTRA-ARTICULAR; INTRALESIONAL; INTRAMUSCULAR; INTRAVENOUS; SOFT TISSUE at 08:01

## 2017-10-31 RX ADMIN — BACITRACIN ZINC 0.9 G: 500 OINTMENT TOPICAL at 09:43

## 2017-10-31 RX ADMIN — PROPOFOL 125 MCG/KG/MIN: 10 INJECTION, EMULSION INTRAVENOUS at 08:01

## 2017-10-31 RX ADMIN — FENTANYL CITRATE 50 MCG: 50 INJECTION, SOLUTION INTRAMUSCULAR; INTRAVENOUS at 07:51

## 2017-10-31 RX ADMIN — MAGNESIUM HYDROXIDE 1000 ML: 1200 LIQUID ORAL at 08:47

## 2017-10-31 RX ADMIN — SODIUM CHLORIDE, SODIUM LACTATE, POTASSIUM CHLORIDE, CALCIUM CHLORIDE: 600; 310; 30; 20 INJECTION, SOLUTION INTRAVENOUS at 08:00

## 2017-10-31 NOTE — DISCHARGE INSTRUCTIONS
Instructions for after your surgery    Leave your splint on and keep it dry. Do not remove it or get it wet.     Keep your operative arm elevated as much as possible. This will help with pain and swelling.     Do not use your operative arm for any lifting, pushing, pulling, weight bearing, or other activities.     Wear your sling as needed for comfort. If you choose to wear the sling, be sure to take it off and range your shoulder and elbow (if not included in the splint) a few times a day so they do not get stiff. (If you have received a regional block, wear the sling at all times until the block wears off.)     You will have a follow-up appointment scheduled with Dr. Berger or Ghazala. If you do not know when this appointment is, please call Dr. Berger's office to find out.     Call Dr. Berger's office if you experience any of the following:   Fevers, chills, increasing wound drainage, pain that is not controlled with the medications you have been prescribing, swelling that is not controlled with elevation, problems with your splint, or any other questions or concerns.     [facility]  Home Care Following Anesthesia  For 24 hours after surgery:  1. Get plenty of rest.  A responsible adult must stay with you for at least 24 hours after you leave the surgery center.  2. Do not drive or use heavy equipment.  If you have weakness or tingling, don't drive or use heavy equipment until this feeling goes away.   3. Do not drink alcohol.   4. Avoid strenuous or risky activities.  Ask for help when climbing stairs.  5. You may feel lightheaded.  IF so, sit for a few minutes before standing.  Have someone help you get up.   6. If you have nausea (feel sick to your stomach): Drink only clear liquids such as apple juice, ginger ale, broth or 7-Up.  Rest may also help.  Be sure to drink enough fluids.  Move to a regular diet as you feel able.   7. You may have a slight fever.  Call the  "doctor if your fever is over 100 F (37.7 C) (taken under the tongue) or lasts longer than 24 hours.  8. You may have a dry mouth, a sore throat, muscle aches or trouble sleeping. These should go away after 24 hours.  9. Do not make important or legal decisions.        Today you received an Exparel block to numb the nerves near your surgery site.  This is a block using local anesthetic or \"numbing\" medication injected around the nerves to anesthetize or \"numb\" the area supplied by those nerves.  This block is injected into the muscle layer near your surgical site.  This medication may numb the location where you had surgery up to 72 hours.  If your surgical site is an arm or leg you should be careful with your affected limb, since it is possible to injure your limb without being aware of it due to the numbing.  Until full feeling returns, you should guard against bumping or hitting your limb, and avoid extreme hot or cold temperatures on the skin.  As the block wears off, the feeling will return as a tingling or prickly sensation near your surgical site.  You will experince more discomfort from your incision as the feeling returns.  You may want to take a pain pill (a narcotic or Tylenol if this was prescribed by your surgeon) when you start to experience mild pain before the pain beomes more severe.  If your pain medications do not control your pain, you should notify your surgeon.    Tips for taking pain medications  To get the best pain relief possible, remember these points:    Take pain medications as directed, before pain becomes severe.    Pain medication can upset your stomach: taking it with food may help.    Constipation is a common side effect of pain medication. Drink plenty of  fluids.    Eat foods high in fiber. Take a stool softener if recommended by your doctor or pharmacist.    Do not drink alcohol, drive or operate machinery while taking pain medications.    Ask about other ways to control pain, " such as with heat, ice or relaxation.    Tylenol/Acetaminophen Consumption  To help encourage the safe use of acetaminophen, the makers of TYLENOL  have lowered the maximum daily dose for single-ingredient Extra Strength TYLENOL  (acetaminophen) products sold in the [place] from 8 pills per day (4,000 mg) to 6 pills per day (3,000 mg). The dosing interval has also changed from 2 pills every 4-6 hours to 2 pills every 6 hours.    If you feel your pain relief is insufficient, you may take Tylenol/Acetaminophen in addition to your narcotic pain medication.     Be careful not to exceed 3,000 mg of Tylenol/Acetaminophen in a 24 hour period from all sources.    If you are taking extra strength Tylenol/acetaminophen (500 mg), the maximum dose is 6 tablets in 24 hours.    If you are taking regular strength acetaminophen (325 mg), the maximum dose is 9 tablets in 24 hours.    Call a doctor for any of the followin. Signs of infection (fever, growing tenderness at the surgery site, a large amount of drainage or bleeding, severe pain, foul-smelling drainage, redness, swelling).  2. It has been over 8 to 10 hours since surgery and you are still not able to urinate (pass water).  3. Headache for over 24 hours.  4. Numbness, tingling or weakness the day after surgery (if you had spinal anesthesia).  Your doctor is:       Dr. Rico Berger, Orthopaedics: [phone]               Or dial [phone] and ask for the resident on call for:  Orthopaedics  For emergency care, call the:  [facility] Emergency Department: [phone] (TTY for hearing impaired: [phone])

## 2017-10-31 NOTE — OP NOTE
PREOPERATIVE DIAGNOSIS:  Left distal radius fracture.      POSTOPERATIVE DIAGNOSIS:  Left distal radius fracture.      PROCEDURE:  Open reduction internal fixation Left distal radius fracture.     ATTENDING SURGEON:  Rico Berger MD      ASSISTANT:  Dk Metcalf MD      ANESTHESIA:  Monitored anesthesia care with regional block    TOURNIQUET TIME:   125 minutes     ESTIMATED BLOOD LOSS:  Minimal      SPECIMENS:  None.      DRAINS:  None.      IMPLANTS:  Acumed distal radius distal volar locking plate and 24 mm Arthrex headless compression screw     COMPLICATIONS:  None apparent.      BRIEF HISTORY:  Lizet Condon is a 32 year old-year-old female who presented after having sustained a left distal radius fracture. This had been splinted with closed reduction in the emergency room. There was persistent dorsal tilt and articular stepoff of the radial styloid fragment. We discussed the risks, benefits and alternatives to surgery. Risks discussed include bleeding, infection, nerve, tendon, or vessel damage, wound healing problems, complex regional pain syndrome, persistent pain and/or stiffness, malunion, nonunion, postraumatic arthritis, hardware irritation, and the possibility for further surgery. The patient, expressed understanding and elected to proceed. Informed consent was obtained. Of note, the patient reports having sustained a distal radius fracture previously that was treated non-operatively. She had intermittent pain for about a year after the injury but subsequently made a full recovery.     INTRAOPERATIVE FINDINGS:  Intraarticular distal radius fracture with three main fracture fragments    DESCRIPTION OF PROCEDURE:  The patient was identified in the preoperative holding area.  The informed consent was reviewed. The operative site was identified and marked. A regional block was performed by the anesthesiologist. The patient was then brought to the operating room and positioned with the operative  extremity over a hand table. Preoperative prophylactic antibiotics were administered. The operative arm was prepped and draped in a standard sterile fashion.  A timeout was performed, verifying the correct patient, procedure to be performed, and operative site. A sterile tourniquet was placed about the upper arm. The arm was exsanguinated and the tourniquet was inflated. A longitudinal incision was made overlying the FCR tendon. The FCR tendon sheath was opened and FCR was retracted ulnarly. The floor of the FCR sheath was then opened and FPL was identified. FPL was mobilized and retracted ulnarly. Protator quadratus was identified. It was released off the radius at its radial insertion, elevated subperiosteally, and retracted ulnarly. Care was taken to maintain the integrity of the volar wrist capsule. Brachioradialis was released off of the radius, taking care to protect the tendons of the first dorsal wrist compartment. The fracture site was then inspected and cleaned with a curette and dental pick. Fracture reduction was attempted but some dorsal angulation persisted. Therefore a 0.062 mm k-wire was placed dorsally into the fracture site and used to elevate the distal fragment and, in so doing, reduce the dorsal tilt. A small incision was made overlying the radial styloid and dissection was taken down to bone with a hemostat. A K-wire was placed to secure the radial styloid fragment in place, drilling through a guide to protect the surrounding soft tissue structures. The appropriately sized plate was selected and a kickstand was threaded in the proximal-most hole. The plate was then secured to bone distally with a non-locking screw through one of the distal holes. The remaining distal holes were filled with locking screws and then the non-locking screw was switched out for a locking screw. The K-wires were removed from the bone. The kickstand was then removed and the plate was brought down to bone proximally  with a non-locking screw. This restored dorsal tilt to at least neutral. I then filled the oblong proximal hole with a non-locking screw and the distal-most proximal hole with a locking screw.  Reduction was checked with direct visualization and with fluoroscopy and was felt to be acceptable. I was concerned about how much purchase the more proximal styloid screw had in the radial styloid fragment. Therefore I placed a 24 mm Arthrex headless compression screw in the radial styloid over a guidewire to further secure the styloid fragment to the shaft. Again, this was all done with careful protection of the surrounding soft tissues. Fluoroscopy was used to confirm that plate and screw position and screw length were correct and to verify that all screws were extra-articular. I stressed the DRUJ in neutral, supination, and pronation and it was stable. The wound was irrigated copiously. The pronator quadratus was repaired to the brachioradialis tendon with 2-0 vicryl figure-of-eight sutures. The tourniquet was released and hemostasis was achieved with a bipolar. The wound was irrigated one more time. The skin was closed with 3-0 vicryl inverted interrupted sutures followed by a 4-0 nylon running trauma stitch. A sterile dressing was applied of xeroform, 4x4s, fluffs, and kerlex. The patient was then placed in a volar slab plaster splint and was then awoken and brought to the recovery room in stable condition. The patient tolerated the procedure well and there were no immediate complications.  All sponge and needle counts were correct at the end of the case.      POSTOPERATIVE PLAN:  The patient will be discharged to home today with oral pain medications . She has been instructed to take Vitamin C 500 mg orally daily for 2 months to reduce the risk of CRPS.  The patient will elevate and ice. The patient will return to clinic in 10-14 days for suture removal and to be placed in a zipper splint. She  can begin range of  motion with hand therapy at that time.

## 2017-10-31 NOTE — IP AVS SNAPSHOT
MRN:[id number]                      After Visit Summary   10/31/2017    Lizet Condon    MRN: [id number]           Thank you!     Thank you for choosing [facility] for your care. Our goal is always to provide you with excellent care. Hearing back from our patients is one way we can continue to improve our services. Please take a few minutes to complete the written survey that you may receive in the mail after you visit with us. Thank you!        Patient Information     Date Of Birth          1985        About your hospital stay     You were admitted on:  October 31, 2017 You last received care in the:  [facility]    You were discharged on:  October 31, 2017       Who to Call     For medical emergencies, please call 911.  For non-urgent questions about your medical care, please call your primary care provider or clinic, [phone]  For questions related to your surgery, please call your surgery clinic        Attending Provider     Provider Specialty    Rico Berger MD Orthopedics       Primary Care Provider Office Phone # Fax #    Vijaya Veronique Weiss [phone]      After Care Instructions      Diet as Tolerated       Return to diet before surgery, unless instructed otherwise.            Discharge Instructions       Review outpatient procedure discharge instructions with patient as directed by Provider            Ice to affected area       Ice pack to surgical site every 15 minutes per hour for 24 hours            No Dressing Change       No dressing change until follow up appointment.            No weight bearing           Return to clinic       Return to clinic in 2 weeks                  Your next 10 appointments already scheduled     Nov 13, 2017  7:00 AM CST   (Arrive by 6:45 AM)   POST-OP HAND with Rico Berger MD   [facility] ([facility])    [address] [place] [address]  [facility] [address] [phone]              Further instructions from your care team       Instructions for after your surgery    Leave your splint on and keep it dry. Do not remove it or get it wet.     Keep your operative arm elevated as much as possible. This will help with pain and swelling.     Do not use your operative arm for any lifting, pushing, pulling, weight bearing, or other activities.     Wear your sling as needed for comfort. If you choose to wear the sling, be sure to take it off and range your shoulder and elbow (if not included in the splint) a few times a day so they do not get stiff. (If you have received a regional block, wear the sling at all times until the block wears off.)     You will have a follow-up appointment scheduled with Dr. Berger or Ghazala. If you do not know when this appointment is, please call Dr. Berger's office to find out.     Call Dr. Berger's office if you experience any of the following:   Fevers, chills, increasing wound drainage, pain that is not controlled with the medications you have been prescribing, swelling that is not controlled with elevation, problems with your splint, or any other questions or concerns.     [facility]  Home Care Following Anesthesia  For 24 hours after surgery:  1. Get plenty of rest.  A responsible adult must stay with you for at least 24 hours after you leave the surgery center.  2. Do not drive or use heavy equipment.  If you have weakness or tingling, don't drive or use heavy equipment until this feeling goes away.   3. Do not drink alcohol.   4. Avoid strenuous or risky activities.  Ask for help when climbing stairs.  5. You may feel lightheaded.  IF so, sit for a few minutes before standing.  Have someone help you get up.   6. If you have nausea (feel sick to your stomach): Drink only clear liquids such as apple juice, ginger ale, broth or 7-Up.  Rest may also help.  Be sure to  "drink enough fluids.  Move to a regular diet as you feel able.   7. You may have a slight fever.  Call the doctor if your fever is over 100 F (37.7 C) (taken under the tongue) or lasts longer than 24 hours.  8. You may have a dry mouth, a sore throat, muscle aches or trouble sleeping. These should go away after 24 hours.  9. Do not make important or legal decisions.        Today you received an Exparel block to numb the nerves near your surgery site.  This is a block using local anesthetic or \"numbing\" medication injected around the nerves to anesthetize or \"numb\" the area supplied by those nerves.  This block is injected into the muscle layer near your surgical site.  This medication may numb the location where you had surgery up to 72 hours.  If your surgical site is an arm or leg you should be careful with your affected limb, since it is possible to injure your limb without being aware of it due to the numbing.  Until full feeling returns, you should guard against bumping or hitting your limb, and avoid extreme hot or cold temperatures on the skin.  As the block wears off, the feeling will return as a tingling or prickly sensation near your surgical site.  You will experince more discomfort from your incision as the feeling returns.  You may want to take a pain pill (a narcotic or Tylenol if this was prescribed by your surgeon) when you start to experience mild pain before the pain beomes more severe.  If your pain medications do not control your pain, you should notify your surgeon.    Tips for taking pain medications  To get the best pain relief possible, remember these points:    Take pain medications as directed, before pain becomes severe.    Pain medication can upset your stomach: taking it with food may help.    Constipation is a common side effect of pain medication. Drink plenty of  fluids.    Eat foods high in fiber. Take a stool softener if recommended by your doctor or pharmacist.    Do not drink " alcohol, drive or operate machinery while taking pain medications.    Ask about other ways to control pain, such as with heat, ice or relaxation.    Tylenol/Acetaminophen Consumption  To help encourage the safe use of acetaminophen, the makers of TYLENOL  have lowered the maximum daily dose for single-ingredient Extra Strength TYLENOL  (acetaminophen) products sold in the [place] from 8 pills per day (4,000 mg) to 6 pills per day (3,000 mg). The dosing interval has also changed from 2 pills every 4-6 hours to 2 pills every 6 hours.    If you feel your pain relief is insufficient, you may take Tylenol/Acetaminophen in addition to your narcotic pain medication.     Be careful not to exceed 3,000 mg of Tylenol/Acetaminophen in a 24 hour period from all sources.    If you are taking extra strength Tylenol/acetaminophen (500 mg), the maximum dose is 6 tablets in 24 hours.    If you are taking regular strength acetaminophen (325 mg), the maximum dose is 9 tablets in 24 hours.    Call a doctor for any of the followin. Signs of infection (fever, growing tenderness at the surgery site, a large amount of drainage or bleeding, severe pain, foul-smelling drainage, redness, swelling).  2. It has been over 8 to 10 hours since surgery and you are still not able to urinate (pass water).  3. Headache for over 24 hours.  4. Numbness, tingling or weakness the day after surgery (if you had spinal anesthesia).  Your doctor is:       Dr. Rico Berger, Orthopaedics: [phone]               Or dial [phone] and ask for the resident on call for:  Orthopaedics  For emergency care, call the:  [facility] Emergency Department: [phone] (TTY for hearing impaired: [phone])                  Pending Results     No orders found from 10/29/2017 to 2017.            Admission Information     Date & Time Provider Department Dept. Phone    10/31/2017 Rico Berger MD [facility] [phone]     "  Your Vitals Were     Blood Pressure Temperature Respirations Height Weight Last Period     97.4  F (36.3  C) (Temporal) 16 1.6 m (5' 3\") 59 kg (130 lb) 10/24/2017    Pulse Oximetry BMI (Body Mass Index)                95% 23.03 kg/m2          MyChart Information     [employer] is an electronic gateway that provides easy, online access to your medical records. With [employer], you can request a clinic appointment, read your test results, renew a prescription or communicate with your care team.     To sign up for [employer] visit the website at [email or web address]   You will be asked to enter the access code listed below, as well as some personal information. Please follow the directions to create your username and password.     Your access code is: [email or web address]  Expires: 2018 12:12 PM     Your access code will  in 90 days. If you need help or a new code, please contact your [facility] or call [phone] for assistance.        Care EveryWhere ID     This is your Care EveryWhere ID. This could be used by other organizations to access your [facility] medical records  [id number]        Equal Access to Services     SURESH GOMEZ AH: Hadii neeraj Harris, waroxaneda rashmi, qaybta kaalmada adesouravda, hoa [place]. So [facility] [phone].    ATENCIÓN: Si habla español, tiene a dang disposición servicios gratuitos de asistencia lingüística. Mercedez al [phone].    We comply with applicable federal civil rights laws and [place] laws. We do not discriminate on the basis of race, color, national origin, age, disability, sex, sexual orientation, or gender identity.               Review of your medicines      CONTINUE these medicines which may have CHANGED, or have new prescriptions. If we are uncertain of the size of tablets/capsules you have at home, strength may be listed as something that might have changed.        Dose / Directions    " ascorbic acid 500 MG tablet   Commonly known as:  VITAMIN C   This may have changed:  additional instructions   Used for:  Closed fracture of left distal radius and ulna, initial encounter        Dose:  500 mg   Take 1 tablet (500 mg) by mouth daily   Quantity:  60 tablet   Refills:  0       oxyCODONE 5 MG IR tablet   Commonly known as:  ROXICODONE   This may have changed:  additional instructions   Used for:  Closed fracture of left distal radius and ulna, initial encounter        Dose:  5 mg   Take 1 tablet (5 mg) by mouth every 4 hours as needed for moderate to severe pain   Quantity:  30 tablet   Refills:  0         CONTINUE these medicines which have NOT CHANGED        Dose / Directions    acetaminophen-codeine 300-30 MG per tablet   Commonly known as:  TYLENOL #3   Used for:  Other closed fracture of distal end of left radius with malunion, subsequent encounter        Dose:  1-2 tablet   Take 1-2 tablets by mouth 3 times daily as needed for pain maximum 6  tablet(s) per day   Quantity:  18 tablet   Refills:  0       albuterol 108 (90 BASE) MCG/ACT Inhaler   Commonly known as:  PROAIR HFA/PROVENTIL HFA/VENTOLIN HFA        Dose:  2 puff   Inhale 2 puffs into the lungs every 6 hours as needed for shortness of breath / dyspnea or wheezing   Quantity:  3 Inhaler   Refills:  1       FLUoxetine 10 MG capsule   Commonly known as:  PROzac        Dose:  10 mg   Take 10 mg by mouth every morning   Quantity:  90 capsule   Refills:  3       ibuprofen 600 MG tablet   Commonly known as:  ADVIL/MOTRIN        Dose:  600 mg   Take 1 tablet (600 mg) by mouth every 8 hours as needed for moderate pain   Quantity:  30 tablet   Refills:  0       klonoPIN 1 MG tablet   Generic drug:  clonazePAM        1 TABLET 2 TIMES DAILY PRN   Quantity:  90   Refills:  0       norgestim-eth estrad triphasic 0.18/0.215/0.25 MG-35 MCG per tablet   Commonly known as:  ORTHO TRI-CYCLEN, TRI-SPRINTEC        Refills:  0       ZYRTEC ALLERGY PO         Take by mouth daily as needed   Refills:  0                Protect others around you: Learn how to safely use, store and throw away your medicines at www.disposemymeds.org.             Medication List: This is a list of all your medications and when to take them. Check marks below indicate your daily home schedule. Keep this list as a reference.      Medications           Morning Afternoon Evening Bedtime As Needed    acetaminophen-codeine 300-30 MG per tablet   Commonly known as:  TYLENOL #3   Take 1-2 tablets by mouth 3 times daily as needed for pain maximum 6  tablet(s) per day                                albuterol 108 (90 BASE) MCG/ACT Inhaler   Commonly known as:  PROAIR HFA/PROVENTIL HFA/VENTOLIN HFA   Inhale 2 puffs into the lungs every 6 hours as needed for shortness of breath / dyspnea or wheezing                                ascorbic acid 500 MG tablet   Commonly known as:  VITAMIN C   Take 1 tablet (500 mg) by mouth daily                                FLUoxetine 10 MG capsule   Commonly known as:  PROzac   Take 10 mg by mouth every morning                                ibuprofen 600 MG tablet   Commonly known as:  ADVIL/MOTRIN   Take 1 tablet (600 mg) by mouth every 8 hours as needed for moderate pain                                klonoPIN 1 MG tablet   1 TABLET 2 TIMES DAILY PRN   Generic drug:  clonazePAM                                norgestim-eth estrad triphasic 0.18/0.215/0.25 MG-35 MCG per tablet   Commonly known as:  ORTHO TRI-CYCLEN, TRI-SPRINTEC                                oxyCODONE 5 MG IR tablet   Commonly known as:  ROXICODONE   Take 1 tablet (5 mg) by mouth every 4 hours as needed for moderate to severe pain                                ZYRTEC ALLERGY PO   Take by mouth daily as needed

## 2017-10-31 NOTE — OR NURSING
Left Supraclavicular block with Exparel administered by Dr. Don and Dr. Fenton. Administered 1mg Versed and 50mcg fentanyl after time-out. Patient tolerated procedure well, VSS throughout and after. Report given to CRNA and pt escorted to OR via cart.

## 2017-10-31 NOTE — ANESTHESIA POSTPROCEDURE EVALUATION
Patient: Lizet Condon    Procedure(s):  Open Reduction Internal Fixation Left Distal Radius Fracture - Wound Class: I-Clean    Diagnosis:Left Distal Radius Fracture  Diagnosis Additional Information: No value filed.    Anesthesia Type:  General, LMA    Note:  Anesthesia Post Evaluation    Patient location during evaluation: bedside  Patient participation: Able to participate in evaluation but full recovery from regional anesthesia has not yet occurred and is not anticipated to occur within 48 hours  Level of consciousness: awake  Pain management: adequate  Airway patency: patent  Cardiovascular status: acceptable  Respiratory status: acceptable  Hydration status: acceptable  PONV: controlled     Anesthetic complications: None          Last vitals:  Vitals:    10/31/17 0755 10/31/17 1100 10/31/17 1115   BP: 101/74 113/69 113/68   Resp: 16 16 16   Temp:  36.3  C (97.4  F)    SpO2: 98% 96% 95%         Electronically Signed By: Jerman Don MD, MD  October 31, 2017  11:39 AM

## 2017-10-31 NOTE — ANESTHESIA PROCEDURE NOTES
Peripheral Nerve Block Procedure Note    Staff:     Anesthesiologist:  DESTINY CROSS    Resident/CRNA:  JUANITO MAHER    Block performed by resident/CRNA in the presence of a teaching physician      Referred By:  BOSSMAN KELLY  Location: Pre-op  Procedure Start/Stop TImes:      10/31/2017 7:45 AM     10/31/2017 7:52 AM    patient identified, IV checked, site marked, risks and benefits discussed, informed consent, monitors and equipment checked, pre-op evaluation, at physician/surgeon's request and post-op pain management      Correct Patient: Yes      Correct Position: Yes      Correct Site: Yes      Correct Procedure: Yes      Correct Laterality:  Yes    Site Marked:  Yes  Procedure details:     Procedure:  Supraclavicular    ASA:  1    Laterality:  Left    Position:  Sitting    Sterile Prep: chloraprep      Local skin infiltration:  None    Needle:  Touhy needle    Needle gauge:  21    Needle length (inches):  3.13    Ultrasound: Yes      Ultrasound used to identify targeted nerve, plexus, or vascular structure and placed a needle adjacent to it      Permanent Image entered into patiient's record      Abnormal pain on injection: No      Blood Aspirated: No      Paresthesias:  No    Bleeding at site: No      Test dose negative for signs of intravascular injection: Yes      Bolus via:  Needle    Infusion Method:  Single Shot    Blood aspirated via catheter: No      Complications:  None  Assessment/Narrative:      10 mL 0.25% bupivacaine and 10mL Exparel injected into the left brachial plexus at the supraclavicular fossa.    Call with questions or concerns,    Juanito Parr MD  Anesthesiology Resident   [phone]

## 2017-10-31 NOTE — ANESTHESIA CARE TRANSFER NOTE
Patient: Lizet Condon    Procedure(s):  Open Reduction Internal Fixation Left Distal Radius Fracture - Wound Class: I-Clean    Diagnosis: Left Distal Radius Fracture  Diagnosis Additional Information: No value filed.    Anesthesia Type:   General, LMA     Note:  Airway :Room Air  Patient transferred to:Phase II  Comments: Arrive Phase II, Stable, Airway Intact  113/69, 59,14,94%,97.4  All questions answered.  Handoff Report: Identifed the Patient, Identified the Reponsible Provider, Reviewed the pertinent medical history, Discussed the surgical course, Reviewed Intra-OP anesthesia mangement and issues during anesthesia, Set expectations for post-procedure period and Allowed opportunity for questions and acknowledgement of understanding      Vitals: (Last set prior to Anesthesia Care Transfer)    CRNA VITALS  10/31/2017 1019 - 10/31/2017 1058      10/31/2017             Pulse: 83    Ht Rate: 83    SpO2: 100 %    Resp Rate (set): 10                Electronically Signed By: ROLAN Carlton CRNA  October 31, 2017  10:58 AM

## 2017-10-31 NOTE — IP AVS SNAPSHOT
[facility]    [address] [place] [address]    Phone:  [phone]    Fax:  [phone]                                       After Visit Summary   10/31/2017    Lizet Condon    MRN: [id number]           After Visit Summary Signature Page     I have received my discharge instructions, and my questions have been answered. I have discussed any challenges I see with this plan with the nurse or doctor.    ..........................................................................................................................................  Patient/Patient Representative Signature      ..........................................................................................................................................  Patient Representative Print Name and Relationship to Patient    ..................................................               ................................................  Date                                            Time    ..........................................................................................................................................  Reviewed by Signature/Title    ...................................................              ..............................................  Date                                                            Time

## 2017-11-03 DIAGNOSIS — S52.509A DISTAL RADIUS FRACTURE: Primary | ICD-10-CM

## 2017-11-04 ENCOUNTER — OFFICE VISIT (OUTPATIENT)
Dept: ORTHOPEDICS | Facility: CLINIC | Age: 32
End: 2017-11-04

## 2017-11-04 VITALS
HEIGHT: 63 IN | HEART RATE: 127 BPM | BODY MASS INDEX: 23.04 KG/M2 | WEIGHT: 130 LBS | DIASTOLIC BLOOD PRESSURE: 72 MMHG | SYSTOLIC BLOOD PRESSURE: 137 MMHG | TEMPERATURE: 99.7 F

## 2017-11-04 DIAGNOSIS — S52.502A CLOSED FRACTURE OF LEFT DISTAL RADIUS AND ULNA, INITIAL ENCOUNTER: ICD-10-CM

## 2017-11-04 DIAGNOSIS — S52.602A CLOSED FRACTURE OF LEFT DISTAL RADIUS AND ULNA, INITIAL ENCOUNTER: ICD-10-CM

## 2017-11-04 RX ORDER — OXYCODONE HYDROCHLORIDE 5 MG/1
5 TABLET ORAL EVERY 4 HOURS PRN
Qty: 15 TABLET | Refills: 0 | Status: SHIPPED | OUTPATIENT
Start: 2017-11-04 | End: 2017-11-07

## 2017-11-04 NOTE — LETTER
"11/4/2017       RE: Lizet Condon  [address]  [facility] [address]     Dear Colleague,    Thank you for referring your patient, Lizet Condon, to the [facility] at [facility]. Please see a copy of my visit note below.    [facility] Note      Patient is a 32 year old femalewho presents to the office today for: fever. S/p ORIF left distal radius on 10/31/17. For past two days has been having fevers, chills, sweats, HA, body aches. Also having chest congestion and cough. Has continued to have pain from surgery site but not worse than previous. Has not removed splint. No numbness, swelling, discoloration in hand.     Past Medical History, Current Medications, and Allergies are reviewed in the electronic medical record as appropriate.     ROS: Pertinent items are noted in HPI. Denies GI, Derm, Urinary, Neuro sx.       EXAM:/72  Pulse 127  Temp 99.7  F (37.6  C)  Ht 5' 3\" (1.6 m)  Wt 130 lb (59 kg)  LMP 10/24/2017  BMI 23.03 kg/m2    General: alert, pleasant, no distress   Left wrist/hand: splint removed. Two incisions clean, dry, intact. No drainage or erythema. SILT throughout fingers. Good capillary refill in fingertips.   Resp: CTAB, no crackles or wheezes  Heart: tachy, no murmur    Radiographs: none    Assessment: Patient is a 32 year old female POD 4 s/p Left distal radius ORIF with fevers, body aches. No apparent infection from surgical site. Given history, suspect upper respiratory infection, possibly influenza    Recommendations:   -recommended follow up in urgent care for further evaluation. Tylenol/ibuprofen for fevers, aches  -given refill for oxycodone for severe breakthrough pain only.   -follow up in ortho clinic if any increase in pain or drainage from surgical site.   -The patient is understanding and in agreement with the plan      Again, thank you for allowing me to " participate in the care of your patient.      Sincerely,    Slick Romo MD

## 2017-11-04 NOTE — MR AVS SNAPSHOT
After Visit Summary   11/4/2017    Lizet Condon    MRN: [id number]           Patient Information     Date Of Birth          1985        Visit Information        Provider Department      11/4/2017 10:00 AM Slick Romo MD [facility]        Today's Diagnoses     Closed fracture of left distal radius and ulna, initial encounter           Follow-ups after your visit        Your next 10 appointments already scheduled     Nov 13, 2017  7:00 AM CST   (Arrive by 6:45 AM)   POST-OP HAND with Rico Berger MD   [facility] ([facility] [place])    [address] [address] [phone]            Nov 13, 2017  8:30 AM CST   (Arrive by 8:15 AM)   KAYKAY Hand with Lizette Weston OT   [facility] ([facility] [place])    [address] [place] [address] [phone]              Who to contact     Please call your clinic at [phone] to:    Ask questions about your health    Make or cancel appointments    Discuss your medicines    Learn about your test results    Speak to your doctor   If you have compliments or concerns about an experience at your clinic, or if you wish to file a complaint, please contact [facility] Patient Relations at [phone] or email us at [email or web address]         Additional Information About Your Visit        MyChart Information     [employer] is an electronic gateway that provides easy, online access to your medical records. With [employer], you can request a clinic appointment, read your test results, renew a prescription or communicate with your care team.     To sign up for MY[employer] visit the website at [email or web address]   You will be asked to enter the access code listed below, as well as some personal information. Please follow the directions to create your username  "and password.     Your access code is: [email or web address]  Expires: 2018 12:12 PM     Your access code will  in 90 days. If you need help or a new code, please contact your [facility] or call [phone] for assistance.        Care EveryWhere ID     This is your Care EveryWhere ID. This could be used by other organizations to access your [facility] medical records  [id number]        Your Vitals Were     Pulse Temperature Height Last Period BMI (Body Mass Index)       127 99.7  F (37.6  C) 5' 3\" (1.6 m) 10/24/2017 23.03 kg/m2        Blood Pressure from Last 3 Encounters:   17 137/72   10/31/17 115/77   10/30/17 122/85    Weight from Last 3 Encounters:   17 130 lb (59 kg)   10/31/17 130 lb (59 kg)   10/30/17 137 lb 8 oz (62.4 kg)              Today, you had the following     No orders found for display         Today's Medication Changes          These changes are accurate as of: 17 11:06 AM.  If you have any questions, ask your nurse or doctor.               These medicines have changed or have updated prescriptions.        Dose/Directions    ascorbic acid 500 MG tablet   Commonly known as:  VITAMIN C   This may have changed:  additional instructions   Used for:  Closed fracture of left distal radius and ulna, initial encounter        Dose:  500 mg   Take 1 tablet (500 mg) by mouth daily   Quantity:  60 tablet   Refills:  0       oxyCODONE IR 5 MG tablet   Commonly known as:  ROXICODONE   This may have changed:  reasons to take this   Used for:  Closed fracture of left distal radius and ulna, initial encounter        Dose:  5 mg   Take 1 tablet (5 mg) by mouth every 4 hours as needed for severe pain   Quantity:  15 tablet   Refills:  0            Where to get your medicines      Some of these will need a paper prescription and others can be bought over the counter.  Ask your nurse if you have questions.     Bring a paper prescription for each of these medications    "  oxyCODONE IR 5 MG tablet                Primary Care Provider Office Phone # Fax #    Vijaya Veronique Weiss [phone] [address] [facility] [address]        Equal Access to Services     SURESH GOMEZ : Farshad neeraj dahl gretel Soanderson, waroxaneda luqadaha, qaybta kaalmada gino, hoa hunt danoabel alejo lagemmaamerica [place]. So [facility] [phone].    ATENCIÓN: Si habla español, tiene a dang disposición servicios gratuitos de asistencia lingüística. [facility] [phone].    We comply with applicable federal civil rights laws and [place] laws. We do not discriminate on the basis of race, color, national origin, age, disability, sex, sexual orientation, or gender identity.            Thank you!     Thank you for choosing [facility]  for your care. Our goal is always to provide you with excellent care. Hearing back from our patients is one way we can continue to improve our services. Please take a few minutes to complete the written survey that you may receive in the mail after your visit with us. Thank you!             Your Updated Medication List - Protect others around you: Learn how to safely use, store and throw away your medicines at www.disposemymeds.org.          This list is accurate as of: 11/4/17 11:06 AM.  Always use your most recent med list.                   Brand Name Dispense Instructions for use Diagnosis    acetaminophen-codeine 300-30 MG per tablet    TYLENOL #3    18 tablet    Take 1-2 tablets by mouth 3 times daily as needed for pain maximum 6  tablet(s) per day    Other closed fracture of distal end of left radius with malunion, subsequent encounter       albuterol 108 (90 BASE) MCG/ACT Inhaler    PROAIR HFA/PROVENTIL HFA/VENTOLIN HFA    3 Inhaler    Inhale 2 puffs into the lungs every 6 hours as needed for shortness of breath / dyspnea or wheezing        ascorbic acid 500 MG tablet    VITAMIN C    60 tablet    Take 1 tablet (500 mg) by mouth daily     Closed fracture of left distal radius and ulna, initial encounter       FLUoxetine 10 MG capsule    PROzac    90 capsule    Take 10 mg by mouth every morning        ibuprofen 600 MG tablet    ADVIL/MOTRIN    30 tablet    Take 1 tablet (600 mg) by mouth every 8 hours as needed for moderate pain        klonoPIN 1 MG tablet   Generic drug:  clonazePAM     90    1 TABLET 2 TIMES DAILY PRN        norgestim-eth estrad triphasic 0.18/0.215/0.25 MG-35 MCG per tablet    ORTHO TRI-CYCLEN, TRI-SPRINTEC          oxyCODONE IR 5 MG tablet    ROXICODONE    15 tablet    Take 1 tablet (5 mg) by mouth every 4 hours as needed for severe pain    Closed fracture of left distal radius and ulna, initial encounter       ZYRTEC ALLERGY PO      Take by mouth daily as needed

## 2017-11-04 NOTE — PROGRESS NOTES
"[facility] Note      Patient is a 32 year old femalewho presents to the office today for: fever. S/p ORIF left distal radius on 10/31/17. For past two days has been having fevers, chills, sweats, HA, body aches. Also having chest congestion and cough. Has continued to have pain from surgery site but not worse than previous. Has not removed splint. No numbness, swelling, discoloration in hand.     Past Medical History, Current Medications, and Allergies are reviewed in the electronic medical record as appropriate.     ROS: Pertinent items are noted in HPI. Denies GI, Derm, Urinary, Neuro sx.       EXAM:/72  Pulse 127  Temp 99.7  F (37.6  C)  Ht 5' 3\" (1.6 m)  Wt 130 lb (59 kg)  LMP 10/24/2017  BMI 23.03 kg/m2    General: alert, pleasant, no distress   Left wrist/hand: splint removed. Two incisions clean, dry, intact. No drainage or erythema. SILT throughout fingers. Good capillary refill in fingertips.   Resp: CTAB, no crackles or wheezes  Heart: tachy, no murmur    Radiographs: none    Assessment: Patient is a 32 year old female POD 4 s/p Left distal radius ORIF with fevers, body aches. No apparent infection from surgical site. Given history, suspect upper respiratory infection, possibly influenza    Recommendations:   -recommended follow up in urgent care for further evaluation. Tylenol/ibuprofen for fevers, aches  -given refill for oxycodone for severe breakthrough pain only.   -follow up in ortho clinic if any increase in pain or drainage from surgical site.   -The patient is understanding and in agreement with the plan    Slick Romo MD      "

## 2017-11-07 ENCOUNTER — OFFICE VISIT (OUTPATIENT)
Dept: FAMILY MEDICINE | Facility: CLINIC | Age: 32
End: 2017-11-07
Payer: COMMERCIAL

## 2017-11-07 ENCOUNTER — NURSE TRIAGE (OUTPATIENT)
Dept: NURSING | Facility: CLINIC | Age: 32
End: 2017-11-07

## 2017-11-07 VITALS
BODY MASS INDEX: 24.45 KG/M2 | RESPIRATION RATE: 14 BRPM | TEMPERATURE: 97.8 F | OXYGEN SATURATION: 92 % | DIASTOLIC BLOOD PRESSURE: 74 MMHG | HEIGHT: 63 IN | WEIGHT: 138 LBS | SYSTOLIC BLOOD PRESSURE: 124 MMHG | HEART RATE: 104 BPM

## 2017-11-07 DIAGNOSIS — J01.00 ACUTE NON-RECURRENT MAXILLARY SINUSITIS: Primary | ICD-10-CM

## 2017-11-07 PROCEDURE — 99213 OFFICE O/P EST LOW 20 MIN: CPT | Mod: 24 | Performed by: FAMILY MEDICINE

## 2017-11-07 RX ORDER — ACETAMINOPHEN AND CODEINE PHOSPHATE 120; 12 MG/5ML; MG/5ML
10 SOLUTION ORAL EVERY 6 HOURS PRN
Qty: 118 ML | Refills: 0 | Status: SHIPPED | OUTPATIENT
Start: 2017-11-07 | End: 2017-11-12

## 2017-11-07 NOTE — MR AVS SNAPSHOT
After Visit Summary   11/7/2017    Lizet Condon    MRN: [id number]           Patient Information     Date Of Birth          1985        Visit Information        Provider Department      11/7/2017 6:30 PM Jaz Vides MD [facility]        Today's Diagnoses     Acute non-recurrent frontal sinusitis    -  1      Care Instructions      Sinusitis (Antibiotic Treatment)    The sinuses are air-filled spaces within the bones of the face. They connect to the inside of the nose. Sinusitis is an inflammation of the tissue lining the sinus cavity. Sinus inflammation can occur during a cold. It can also be due to allergies to pollens and other particles in the air. Sinusitis can cause symptoms of sinus congestion and fullness. A sinus infection causes fever, headache and facial pain. There is often green or yellow drainage from the nose or into the back of the throat (post-nasal drip). You have been given antibiotics to treat this condition.  Home care:    Take the full course of antibiotics as instructed. Do not stop taking them, even if you feel better.    Drink plenty of water, hot tea, and other liquids. This may help thin mucus. It also may promote sinus drainage.    Heat may help soothe painful areas of the face. Use a towel soaked in hot water. Or,  the shower and direct the hot spray onto your face. Using a vaporizer along with a menthol rub at night may also help.     An expectorant containing guaifenesin may help thin the mucus and promote drainage from the sinuses.    Over-the-counter decongestants may be used unless a similar medicine was prescribed. Nasal sprays work the fastest. Use one that contains phenylephrine or oxymetazoline. First blow the nose gently. Then use the spray. Do not use these medicines more often than directed on the label or symptoms may get worse. You may also use tablets containing pseudoephedrine. Avoid products that combine  ingredients, because side effects may be increased. Read labels. You can also ask the pharmacist for help. (NOTE: Persons with high blood pressure should not use decongestants. They can raise blood pressure.)    Over-the-counter antihistamines may help if allergies contributed to your sinusitis.      Do not use nasal rinses or irrigation during an acute sinus infection, unless told to by your health care provider. Rinsing may spread the infection to other sinuses.    Use acetaminophen or ibuprofen to control pain, unless another pain medicine was prescribed. (If you have chronic liver or kidney disease or ever had a stomach ulcer, talk with your doctor before using these medicines. Aspirin should never be used in anyone under 18 years of age who is ill with a fever. It may cause severe liver damage.)    Don't smoke. This can worsen symptoms.  Follow-up care  Follow up with your healthcare provider or our staff if you are not improving within the next week.  When to seek medical advice  Call your healthcare provider if any of these occur:    Facial pain or headache becoming more severe    Stiff neck    Unusual drowsiness or confusion    Swelling of the forehead or eyelids    Vision problems, including blurred or double vision    Fever of 100.4 F (38 C) or higher, or as directed by your healthcare provider    Seizure    Breathing problems    Symptoms not resolving within 10 days  Date Last Reviewed: 4/13/2015 2000-2017 [employer]. [address] [place] [address], [place] [address]. All rights reserved. This information is not intended as a substitute for professional medical care. Always follow your healthcare professional's instructions.                Follow-ups after your visit        Follow-up notes from your care team     Return if symptoms worsen or fail to improve.      Your next 10 appointments already scheduled     Nov 13, 2017  7:00 AM CST   (Arrive by 6:45 AM)   POST-OP HAND with Rico Marshall  "MD Ab   [facility] ([facility])    [address] [facility]  [address] [facility] [address] [phone]            Nov 13, 2017  8:30 AM CST   (Arrive by 8:15 AM)   KAYKAY Hand with Lizette Weston OT   [facility] ([facility])    [address] [facility]  [address] [facility] [address] [phone]              Who to contact     If you have questions or need follow up information about today's clinic visit or your schedule please contact [facility] directly at [phone].  Normal or non-critical lab and imaging results will be communicated to you by MyChart, letter or phone within 4 business days after the clinic has received the results. If you do not hear from us within 7 days, please contact the clinic through [email or web address] or phone. If you have a critical or abnormal lab result, we will notify you by phone as soon as possible.  Submit refill requests through [employer] or call your pharmacy and they will forward the refill request to us. Please allow 3 business days for your refill to be completed.          Additional Information About Your Visit        MyChart Information     [employer] gives you secure access to your electronic health record. If you see a primary care provider, you can also send messages to your care team and make appointments. If you have questions, please call your primary care clinic.  If you do not have a primary care provider, please call [phone] and they will assist you.        Care EveryWhere ID     This is your Care EveryWhere ID. This could be used by other organizations to access your [place] medical records  [id number]        Your Vitals Were     Pulse Temperature Respirations Height Last Period Pulse Oximetry    104 97.8  F (36.6  C) (Oral) 14 5' 3\" (1.6 m) 10/24/2017 92%    Breastfeeding? BMI (Body Mass Index)                No 24.45 kg/m2           Blood Pressure from Last 3 " Encounters:   11/07/17 124/74   11/04/17 137/72   10/31/17 115/77    Weight from Last 3 Encounters:   11/07/17 138 lb (62.6 kg)   11/04/17 130 lb (59 kg)   10/31/17 130 lb (59 kg)              Today, you had the following     No orders found for display         Today's Medication Changes          These changes are accurate as of: 11/7/17  7:12 PM.  If you have any questions, ask your nurse or doctor.               Start taking these medicines.        Dose/Directions    acetaminophen-codeine 120-12 mg/5 mL solution   Used for:  Acute non-recurrent frontal sinusitis   Started by:  Jaz Vides MD        Dose:  10 mL   Take 10 mLs by mouth every 6 hours as needed for pain maximum 30 mL per day   Quantity:  118 mL   Refills:  0       amoxicillin-clavulanate 875-125 MG per tablet   Commonly known as:  AUGMENTIN   Used for:  Acute non-recurrent frontal sinusitis   Started by:  Jaz Vides MD        Dose:  1 tablet   Take 1 tablet by mouth 2 times daily   Quantity:  20 tablet   Refills:  0         These medicines have changed or have updated prescriptions.        Dose/Directions    ascorbic acid 500 MG tablet   Commonly known as:  VITAMIN C   This may have changed:  additional instructions   Used for:  Closed fracture of left distal radius and ulna, initial encounter        Dose:  500 mg   Take 1 tablet (500 mg) by mouth daily   Quantity:  60 tablet   Refills:  0            Where to get your medicines      These medications were sent to [facility]/pharmacy #[id number] [place], [place] - [address] [facility]  [address] [facility] [address]     Phone:  [phone]     amoxicillin-clavulanate 875-125 MG per tablet         Some of these will need a paper prescription and others can be bought over the counter.  Ask your nurse if you have questions.     Bring a paper prescription for each of these medications     acetaminophen-codeine 120-12 mg/5 mL solution                Primary Care Provider Office Phone #  Fax #    Vijaya Madridmilana Weiss [phone] [address] [facility] [address]        Equal Access to Services     SURESH GOMEZ : Farshad neeraj dahl gretel Harris, waroxaneda luqniranjan, qamaria eugeniata kaole christian, hoa alejo lagemmaamerica [place]. So [facility] [phone].    ATENCIÓN: Si habla español, tiene a dang disposición servicios gratuitos de asistencia lingüística. Llame al [phone].    We comply with applicable federal civil rights laws and [place] laws. We do not discriminate on the basis of race, color, national origin, age, disability, sex, sexual orientation, or gender identity.            Thank you!     Thank you for choosing [facility]  for your care. Our goal is always to provide you with excellent care. Hearing back from our patients is one way we can continue to improve our services. Please take a few minutes to complete the written survey that you may receive in the mail after your visit with us. Thank you!             Your Updated Medication List - Protect others around you: Learn how to safely use, store and throw away your medicines at www.disposemymeds.org.          This list is accurate as of: 11/7/17  7:12 PM.  Always use your most recent med list.                   Brand Name Dispense Instructions for use Diagnosis    acetaminophen-codeine 120-12 mg/5 mL solution     118 mL    Take 10 mLs by mouth every 6 hours as needed for pain maximum 30 mL per day    Acute non-recurrent frontal sinusitis       albuterol 108 (90 BASE) MCG/ACT Inhaler    PROAIR HFA/PROVENTIL HFA/VENTOLIN HFA    3 Inhaler    Inhale 2 puffs into the lungs every 6 hours as needed for shortness of breath / dyspnea or wheezing        amoxicillin-clavulanate 875-125 MG per tablet    AUGMENTIN    20 tablet    Take 1 tablet by mouth 2 times daily    Acute non-recurrent frontal sinusitis       ascorbic acid 500 MG tablet    VITAMIN C    60 tablet    Take 1 tablet (500 mg) by mouth daily     Closed fracture of left distal radius and ulna, initial encounter       FLUoxetine 10 MG capsule    PROzac    90 capsule    Take 10 mg by mouth every morning        ibuprofen 600 MG tablet    ADVIL/MOTRIN    30 tablet    Take 1 tablet (600 mg) by mouth every 8 hours as needed for moderate pain        klonoPIN 1 MG tablet   Generic drug:  clonazePAM     90    1 TABLET 2 TIMES DAILY PRN        norgestim-eth estrad triphasic 0.18/0.215/0.25 MG-35 MCG per tablet    ORTHO TRI-CYCLEN, TRI-SPRINTEC          ZYRTEC ALLERGY PO      Take by mouth daily as needed

## 2017-11-08 ENCOUNTER — TELEPHONE (OUTPATIENT)
Dept: ORTHOPEDICS | Facility: CLINIC | Age: 32
End: 2017-11-08

## 2017-11-08 NOTE — NURSING NOTE
"Chief Complaint   Patient presents with     URI     went to [facility] [place]-given Benzonatate 100mg-not helping-       Initial /74  Pulse 104  Temp 97.8  F (36.6  C) (Oral)  Resp 14  Ht 5' 3\" (1.6 m)  Wt 138 lb (62.6 kg)  LMP 10/24/2017  SpO2 92%  Breastfeeding? No  BMI 24.45 kg/m2 Estimated body mass index is 24.45 kg/(m^2) as calculated from the following:    Height as of this encounter: 5' 3\" (1.6 m).    Weight as of this encounter: 138 lb (62.6 kg).  BP completed using cuff size: regular    Health Maintenance that is potentially due pending provider review:  Health Maintenance Due   Topic Date Due     PAP SCREENING Q3 YR (SYSTEM ASSIGNED)  10/13/2006     INFLUENZA VACCINE (SYSTEM ASSIGNED)  09/01/2017         Will call and schedule pap when better  Flu vaccine deferred-Pt ill today  "

## 2017-11-08 NOTE — PROGRESS NOTES
"  SUBJECTIVE:   Lizet Condon is a 32 year old female who presents to clinic today for the following health issues:      RESPIRATORY SYMPTOMS      Duration: x4 days    Description  nasal congestion, rhinorrhea, sore throat from coughing, facial pain/pressure, cough, wheezing, fever, chills, ear pain both, headache, fatigue/malaise, hoarse voice, myalgias and nausea    Severity: severe    Accompanying signs and symptoms: sleeping problems from coughing    History (predisposing factors):  Left wrist surgery on 10-31-17    Precipitating or alleviating factors: None    Therapies tried and outcome:  rest and fluids oral decongestant antihistamine acetaminophen OTC NSAID guaifenesin cough drops    The patient presented to [facility] yesterday and was informed it is an acute bronchitis and it will resolve. She presents to clinic today for re-evaluation. The patient reports that she has been unable to sleep due to presistent cough and rhinorrhea. Reports facial pressure and headache. Denies any chest pain, shortness of breath or palpitations,. Denies any fevers or chills.     Allergies   Allergen Reactions     Norco [Hydrocodone-Acetaminophen] Nausea     BP Readings from Last 3 Encounters:   11/07/17 124/74   11/04/17 137/72   10/31/17 115/77    Wt Readings from Last 3 Encounters:   11/07/17 138 lb (62.6 kg)   11/04/17 130 lb (59 kg)   10/31/17 130 lb (59 kg)         Reviewed and updated as needed this visit by clinical staffTobacco  Allergies  Meds  Med Hx  Surg Hx  Fam Hx  Soc Hx             ROS:  Constitutional, HEENT, cardiovascular, pulmonary, gi and gu systems are negative, except as otherwise noted.      OBJECTIVE:   /74  Pulse 104  Temp 97.8  F (36.6  C) (Oral)  Resp 14  Ht 5' 3\" (1.6 m)  Wt 138 lb (62.6 kg)  LMP 10/24/2017  SpO2 92%  Breastfeeding? No  BMI 24.45 kg/m2  Body mass index is 24.45 kg/(m^2).  GENERAL: ill looking, alert and no distress  EYES: Eyes grossly normal to " inspection, PERRL and injected conjunctivae  HENT: ear canals and TM's normal, moderate pharyngeal erythema with cobblestoning of posterior pharynx. Tenderness to palpation over the maxillary sinus.   NECK: no adenopathy, no asymmetry, masses, or scars and thyroid normal to palpation  RESP: lungs clear to auscultation - no rales, rhonchi or wheezes  CV: regular rate and rhythm, normal S1 S2, no S3 or S4, no murmur, click or rub, no peripheral edema and peripheral pulses strong    ASSESSMENT/PLAN:   1. Acute non-recurrent maxillary sinusitis  Status: worsening  Assessment: Patient reports that she has been unable to work or sleep due to cough. Increase nasal discharge. She was informed that acute sinusitis resolved without antibiotics. She was given a prescription of antibiotics and advised to take it if symptoms persist or gets worse.   Plan:    - acetaminophen-codeine 120-12 mg/5 mL solution; Take 10 mLs by mouth every 6 hours as needed for pain maximum 30 mL per day  Dispense: 118 mL; Refill: 0  - amoxicillin-clavulanate (AUGMENTIN) 875-125 MG per tablet; Take 1 tablet by mouth 2 times daily  Dispense: 20 tablet; Refill: 0    Jaz Vides MD  [facility]

## 2017-11-08 NOTE — TELEPHONE ENCOUNTER
[facility]:  Nursing Note  SITUATION                                                      Lizet Condon is a 32 year old female who is S/P ORIF left distal radius on 10/31/2017    BACKGROUND                                                      Patient is s/p ORIF left distal radius on 10/31/2017.    Date of last clinic appointment: on 10/31/2017 (surgery day) with Dr. Berger    Does patient have a future appointment scheduled?  Yes -  next appointment is on: 11/13/2017        ASSESSMENT    Lizet was seen in the walk-in ortho clinic on 11/4/2017 for fevers, chills, sweats, HA, body aches, and chest congestion and cough. During her appointment, her splint was removed and the incision was well approximated. There was no erythema, no drainage, and modest swelling in the left hand/wrist. It was recommended at that time that she follow up in urgent care and her oxycodone was refilled for the wrist pain.   A new splint was applied by this writer on 11/4 using xeroform over incision, 4x4 gauze over incision, kerlix, cast padding, short arm 4 inch plaster volar slab held in place by ace wraps.   Spoke to Lizet today 11/8/2017 to check on how she is feeling. She went to the doctor again last evening and was prescribed antibiotics and a different cough medication. She states she has bee afebrile for almost 24 hrs. She was diagnosed with an URI. The cough is improving. Her left wrist is feeling better. She has some numbness in the thumb. Overall, she states she feels better.     PLAN                                                      Nursing Interventions: Continue elevation and ice. Follow up as scheduled on 11/13/17.  RECOMMENDED DISPOSITION: see above  Will comply with recommendation: Yes    Patient/family can be reached at: N/A    If further questions/concerns or if symptoms do not improve, worsen or new symptoms develop, patient/family are advised to call [phone], option #3 to  speak with a triage nurse, as soon as possible.    Ghazala Higgins

## 2017-11-08 NOTE — TELEPHONE ENCOUNTER
"  Reason for Disposition    Pharmacy calling with prescription questions and triager unable to answer question    Additional Information    Negative: Drug overdose and nurse unable to answer question    Negative: Caller requesting information not related to medicine    Negative: Caller requesting a prescription for Strep throat and has a positive culture result    Negative: Rash while taking a medication or within 3 days of stopping it    Negative: Immunization reaction suspected    Negative: [1] Asthma and [2] having symptoms of asthma (cough, wheezing, etc)    Negative: MORE THAN A DOUBLE DOSE of a prescription or over-the-counter (OTC) drug    Negative: [1] DOUBLE DOSE (an extra dose or lesser amount) of over-the-counter (OTC) drug AND [2] any symptoms (e.g., dizziness, nausea, pain, sleepiness)    Negative: [1] DOUBLE DOSE (an extra dose or lesser amount) of prescription drug AND [2] any symptoms (e.g., dizziness, nausea, pain, sleepiness)    Negative: Took another person's prescription drug    Negative: [1] DOUBLE DOSE (an extra dose or lesser amount) of prescription drug AND [2] NO symptoms (Exception: a double dose of antibiotics)    Negative: Diabetes drug error or overdose (e.g., insulin or extra dose)    Negative: [1] Request for URGENT new prescription or refill of \"essential\" medication (i.e., likelihood of harm to patient if not taken) AND [2] triager unable to fill per unit policy    Negative: [1] Prescription not at pharmacy AND [2] was prescribed today by PCP    Protocols used: MEDICATION QUESTION CALL-[facility]-      Paged on call provider Dr. Roldan via [employer] web at 7:51 pm \"Call Kiana DO [phone] re: Lizet Condon, [birth year] Pharmacist needs verbal to change from acetaminophen-codeine to guaifenesin-codine from apt today.\" Dr. Brooks called back at 7:55 pm and gave verbal ok to change to guaifenesin-codine and called pharmacist and notified her of this and will fill with same " directions.     Kiana Shelton, RN, BSN  [facility]

## 2017-11-08 NOTE — PATIENT INSTRUCTIONS
Sinusitis (Antibiotic Treatment)    The sinuses are air-filled spaces within the bones of the face. They connect to the inside of the nose. Sinusitis is an inflammation of the tissue lining the sinus cavity. Sinus inflammation can occur during a cold. It can also be due to allergies to pollens and other particles in the air. Sinusitis can cause symptoms of sinus congestion and fullness. A sinus infection causes fever, headache and facial pain. There is often green or yellow drainage from the nose or into the back of the throat (post-nasal drip). You have been given antibiotics to treat this condition.  Home care:    Take the full course of antibiotics as instructed. Do not stop taking them, even if you feel better.    Drink plenty of water, hot tea, and other liquids. This may help thin mucus. It also may promote sinus drainage.    Heat may help soothe painful areas of the face. Use a towel soaked in hot water. Or,  the shower and direct the hot spray onto your face. Using a vaporizer along with a menthol rub at night may also help.     An expectorant containing guaifenesin may help thin the mucus and promote drainage from the sinuses.    Over-the-counter decongestants may be used unless a similar medicine was prescribed. Nasal sprays work the fastest. Use one that contains phenylephrine or oxymetazoline. First blow the nose gently. Then use the spray. Do not use these medicines more often than directed on the label or symptoms may get worse. You may also use tablets containing pseudoephedrine. Avoid products that combine ingredients, because side effects may be increased. Read labels. You can also ask the pharmacist for help. (NOTE: Persons with high blood pressure should not use decongestants. They can raise blood pressure.)    Over-the-counter antihistamines may help if allergies contributed to your sinusitis.      Do not use nasal rinses or irrigation during an acute sinus infection, unless told to by  your health care provider. Rinsing may spread the infection to other sinuses.    Use acetaminophen or ibuprofen to control pain, unless another pain medicine was prescribed. (If you have chronic liver or kidney disease or ever had a stomach ulcer, talk with your doctor before using these medicines. Aspirin should never be used in anyone under 18 years of age who is ill with a fever. It may cause severe liver damage.)    Don't smoke. This can worsen symptoms.  Follow-up care  Follow up with your healthcare provider or our staff if you are not improving within the next week.  When to seek medical advice  Call your healthcare provider if any of these occur:    Facial pain or headache becoming more severe    Stiff neck    Unusual drowsiness or confusion    Swelling of the forehead or eyelids    Vision problems, including blurred or double vision    Fever of 100.4 F (38 C) or higher, or as directed by your healthcare provider    Seizure    Breathing problems    Symptoms not resolving within 10 days  Date Last Reviewed: 4/13/2015 2000-2017 [email or web address] [address] [place] [address], [place] [id number]. All rights reserved. This information is not intended as a substitute for professional medical care. Always follow your healthcare professional's instructions.

## 2017-11-10 DIAGNOSIS — S52.502A FRACTURE OF LEFT DISTAL RADIUS: Primary | ICD-10-CM

## 2017-11-11 ENCOUNTER — HEALTH MAINTENANCE LETTER (OUTPATIENT)
Age: 32
End: 2017-11-11

## 2017-11-13 ENCOUNTER — THERAPY VISIT (OUTPATIENT)
Dept: OCCUPATIONAL THERAPY | Facility: CLINIC | Age: 32
End: 2017-11-13
Payer: COMMERCIAL

## 2017-11-13 ENCOUNTER — DOCUMENTATION ONLY (OUTPATIENT)
Dept: ORTHOPEDICS | Facility: CLINIC | Age: 32
End: 2017-11-13

## 2017-11-13 ENCOUNTER — OFFICE VISIT (OUTPATIENT)
Dept: ORTHOPEDICS | Facility: CLINIC | Age: 32
End: 2017-11-13

## 2017-11-13 VITALS — HEIGHT: 63 IN | BODY MASS INDEX: 24.45 KG/M2 | WEIGHT: 138 LBS

## 2017-11-13 DIAGNOSIS — M25.532 LEFT WRIST PAIN: ICD-10-CM

## 2017-11-13 DIAGNOSIS — S52.502D CLOSED FRACTURE OF DISTAL END OF LEFT RADIUS WITH ROUTINE HEALING, UNSPECIFIED FRACTURE MORPHOLOGY, SUBSEQUENT ENCOUNTER: Primary | ICD-10-CM

## 2017-11-13 DIAGNOSIS — S52.592P OTHER CLOSED FRACTURE OF DISTAL END OF LEFT RADIUS WITH MALUNION, SUBSEQUENT ENCOUNTER: Primary | ICD-10-CM

## 2017-11-13 PROCEDURE — 97110 THERAPEUTIC EXERCISES: CPT | Mod: GO | Performed by: OCCUPATIONAL THERAPIST

## 2017-11-13 PROCEDURE — 97165 OT EVAL LOW COMPLEX 30 MIN: CPT | Mod: GO | Performed by: OCCUPATIONAL THERAPIST

## 2017-11-13 PROCEDURE — 29125 APPL SHORT ARM SPLINT STATIC: CPT | Mod: GO | Performed by: OCCUPATIONAL THERAPIST

## 2017-11-13 RX ORDER — OXYCODONE HYDROCHLORIDE 5 MG/1
5 TABLET ORAL EVERY 4 HOURS PRN
Qty: 20 TABLET | Refills: 0 | Status: SHIPPED | OUTPATIENT
Start: 2017-11-13 | End: 2017-12-12

## 2017-11-13 ASSESSMENT — ENCOUNTER SYMPTOMS
STIFFNESS: 0
EXERCISE INTOLERANCE: 1
WEIGHT LOSS: 0
BLOATING: 0
DECREASED CONCENTRATION: 0
POSTURAL DYSPNEA: 1
CHILLS: 0
JAUNDICE: 0
NECK PAIN: 1
WEIGHT GAIN: 0
SNORES LOUDLY: 1
DECREASED APPETITE: 1
JOINT SWELLING: 0
NAUSEA: 1
NERVOUS/ANXIOUS: 0
ABDOMINAL PAIN: 0
PALPITATIONS: 0
HOT FLASHES: 0
SHORTNESS OF BREATH: 1
POLYPHAGIA: 0
TROUBLE SWALLOWING: 0
VOMITING: 0
INSOMNIA: 1
SLEEP DISTURBANCES DUE TO BREATHING: 0
SYNCOPE: 0
BLOOD IN STOOL: 0
LEG PAIN: 0
MYALGIAS: 1
ORTHOPNEA: 1
HEMOPTYSIS: 0
BACK PAIN: 1
ALTERED TEMPERATURE REGULATION: 0
NIGHT SWEATS: 1
DECREASED LIBIDO: 1
COUGH DISTURBING SLEEP: 1
DIARRHEA: 1
ARTHRALGIAS: 0
DYSPNEA ON EXERTION: 1
NECK MASS: 0
HALLUCINATIONS: 0
LIGHT-HEADEDNESS: 0
INCREASED ENERGY: 1
SPUTUM PRODUCTION: 1
MUSCLE WEAKNESS: 1
RECTAL PAIN: 0
SINUS PAIN: 1
FEVER: 1
TASTE DISTURBANCE: 0
HOARSE VOICE: 1
POLYDIPSIA: 0
CONSTIPATION: 0
PANIC: 0
SMELL DISTURBANCE: 0
MUSCLE CRAMPS: 1
SINUS CONGESTION: 1
COUGH: 1
HEARTBURN: 0
HYPERTENSION: 1
HYPOTENSION: 0
SORE THROAT: 0
WHEEZING: 1
FATIGUE: 1
BOWEL INCONTINENCE: 0
DEPRESSION: 0

## 2017-11-13 NOTE — PROGRESS NOTES
"Date of Service: Nov 13, 2017    Surgery: ORIF left distal radius fx on 10/31/17    Interval events: Lizet was seen at walk in clinic and then family practice because of nasal congestion, myalgias, fevers and chills. Her postop dressing was taken down and her wound was benign appearing. She was given antibiotics. She is still taking them. She was taking ibuprofen and oxycodone for pain until she ran out of the oxycodone. She feels the cold has made her wrist worst because of her violent coughing. Lizet has not been taking the vitamin C because she has been sick and lying down and the obttle said not to take while lying down.    Review of Systems: A 14-point review of systems was obtained on intake reviewed. It is included at the bottom of this note.     Physical examination:  Height 1.6 m (5' 3\"), weight 62.6 kg (138 lb), last menstrual period 10/24/2017, not currently breastfeeding.  Pain is rated 7 out of 10 on the visual analog scale.  Well-developed, well-nourished and in no acute distress.  Alert and oriented to surroundings.  Incision clean, dry, and intact. No erythema, no drainage, and no dehiscence. Sutures in place. Sensation intact in median, radial, ulnar nerve distributions. Able to flex and extend fingers. Able to flex and extend the thumb. Able to fire her interosseous muscles.    Radiographs: Stable post-surgical alignment of left distal radius fracture with hardware in place.       Assessment: Recovering appropriately s/p the above surgery.     Plan: Sutures will be removed today. She can now wash the incision with soap and water and pat it dry daily. No immersion of the wound in water such as baths, dishwashing, or swimming for 2 more weeks. She will be seen by hand therapy and provided with a zipper splint. The splint should be worn at all times except for gentle range of motion exercises. No lifting more than the weight up a coffee cup for 4 more weeks. I will see her back in 4 weeks with " repeat x-rays. She will start the vitamin C and stay upright for 30 minutes after taking.       Answers for HPI/ROS submitted by the patient on 11/13/2017   General Symptoms: Yes  Skin Symptoms: No  HENT Symptoms: Yes  EYE SYMPTOMS: No  HEART SYMPTOMS: Yes  LUNG SYMPTOMS: Yes  INTESTINAL SYMPTOMS: Yes  URINARY SYMPTOMS: No  GYNECOLOGIC SYMPTOMS: Yes  BREAST SYMPTOMS: No  SKELETAL SYMPTOMS: Yes  BLOOD SYMPTOMS: No  NERVOUS SYSTEM SYMPTOMS: No  MENTAL HEALTH SYMPTOMS: Yes  Fever: Yes  Loss of appetite: Yes  Weight loss: No  Weight gain: No  Fatigue: Yes  Night sweats: Yes  Chills: No  Increased stress: Yes  Excessive hunger: No  Excessive thirst: No  Feeling hot or cold when others believe the temperature is normal: No  Loss of height: No  Post-operative complications: No  Surgical site pain: Yes  Hallucinations: No  Change in or Loss of Energy: Yes  Hyperactivity: No  Confusion: No  Ear pain: No  Ear discharge: No  Hearing loss: No  Tinnitus: No  Nosebleeds: No  Congestion: Yes  Sinus pain: Yes  Trouble swallowing: No   Voice hoarseness: Yes  Mouth sores: No  Sore throat: No  Tooth pain: No  Gum tenderness: No  Bleeding gums: No  Change in taste: No  Change in sense of smell: No  Dry mouth: No  Hearing aid used: No  Neck lump: No  Cough: Yes  Sputum or phlegm: Yes  Coughing up blood: No  Difficulty breating or shortness of breath: Yes  Snoring: Yes  Wheezing: Yes  Difficulty breathing on exertion: Yes  Nighttime Cough: Yes  Difficulty breathing when lying flat: Yes  Chest pain or pressure: No  Fast or irregular heartbeat: No  Pain in legs with walking: No  Trouble breathing while lying down: Yes  Fingers or toes appear blue: No  High blood pressure: Yes  Low blood pressure: No  Fainting: No  Murmurs: No  Pacemaker: No  Varicose veins: No  Edema or swelling: No  Wake up at night with shortness of breath: No  Light-headedness: No  Exercise intolerance: Yes  Heart burn or indigestion: No  Nausea: Yes  Vomiting:  No  Abdominal pain: No  Bloating: No  Constipation: No  Diarrhea: Yes  Blood in stool: No  Black stools: No  Rectal or Anal pain: No  Fecal incontinence: No  Yellowing of skin or eyes: No  Vomit with blood: No  Change in stools: No  Back pain: Yes  Muscle aches: Yes  Neck pain: Yes  Swollen joints: No  Joint pain: No  Bone pain: No  Muscle cramps: Yes  Muscle weakness: Yes  Joint stiffness: No  Bone fracture: Yes  Bleeding or spotting between periods: Yes  Heavy or painful periods: No  Irregular periods: No  Vaginal discharge: No  Hot flashes: No  Vaginal dryness: No  Genital ulcers: No  Reduced libido: Yes  Painful intercourse: No  Difficulty with sexual arousal: No  Post-menopausal bleeding: No  Nervous or Anxious: No  Depression: No  Trouble sleeping: Yes  Trouble thinking or concentrating: No  Mood changes: No  Panic attacks: No
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
## 2017-11-13 NOTE — MR AVS SNAPSHOT
After Visit Summary   11/13/2017    Lizet Condon    MRN: [id number]           Patient Information     Date Of Birth          1985        Visit Information        Provider Department      11/13/2017 7:00 AM Rico Berger MD [facility]        Today's Diagnoses     Closed fracture of distal end of left radius with routine healing, unspecified fracture morphology, subsequent encounter    -  1       Follow-ups after your visit        Additional Services     HAND THERAPY       Zipper splint at all times except for range of motion exercises. No lifting > weight of coffee cup.                  Your next 10 appointments already scheduled     Nov 13, 2017  8:30 AM CST   (Arrive by 8:15 AM)   KAYKAY Hand with Lizette Weston OT   [facility] ([facility])    [address]  [address] [facility] [address] [phone]              Who to contact     Please call your clinic at [phone] to:    Ask questions about your health    Make or cancel appointments    Discuss your medicines    Learn about your test results    Speak to your doctor   If you have compliments or concerns about an experience at your clinic, or if you wish to file a complaint, please contact [facility] Patient Relations at [phone] or email us at [email or web address]         Additional Information About Your Visit        MyChart Information     [employer] gives you secure access to your electronic health record. If you see a primary care provider, you can also send messages to your care team and make appointments. If you have questions, please call your primary care clinic.  If you do not have a primary care provider, please call [phone] and they will assist you.      [employer] is an electronic gateway that provides easy, online access to your medical records. With [employer], you can request a clinic appointment, read  "your test results, renew a prescription or communicate with your care team.     To access your existing account, please contact your [facility] or call [phone] for assistance.        Care EveryWhere ID     This is your Care EveryWhere ID. This could be used by other organizations to access your [place] medical records  [id number]        Your Vitals Were     Height Last Period BMI (Body Mass Index)             1.6 m (5' 3\") 10/24/2017 24.45 kg/m2          Blood Pressure from Last 3 Encounters:   11/07/17 124/74   11/04/17 137/72   10/31/17 115/77    Weight from Last 3 Encounters:   11/13/17 62.6 kg (138 lb)   11/07/17 62.6 kg (138 lb)   11/04/17 59 kg (130 lb)              We Performed the Following     HAND THERAPY          Today's Medication Changes          These changes are accurate as of: 11/13/17  7:33 AM.  If you have any questions, ask your nurse or doctor.               Start taking these medicines.        Dose/Directions    oxyCODONE IR 5 MG tablet   Commonly known as:  ROXICODONE   Used for:  Closed fracture of distal end of left radius with routine healing, unspecified fracture morphology, subsequent encounter   Started by:  Rico Berger MD        Dose:  5 mg   Take 1 tablet (5 mg) by mouth every 4 hours as needed for moderate to severe pain   Quantity:  20 tablet   Refills:  0         These medicines have changed or have updated prescriptions.        Dose/Directions    ascorbic acid 500 MG tablet   Commonly known as:  VITAMIN C   This may have changed:  additional instructions   Used for:  Closed fracture of left distal radius and ulna, initial encounter        Dose:  500 mg   Take 1 tablet (500 mg) by mouth daily   Quantity:  60 tablet   Refills:  0            Where to get your medicines      Some of these will need a paper prescription and others can be bought over the counter.  Ask your nurse if you have questions.     Bring a paper prescription for each " of these medications     oxyCODONE IR 5 MG tablet                Primary Care Provider Office Phone # Fax #    Vijaya Veronique Weiss [phone] [address] [facility] [address]        Equal Access to Services     SURESH GOMEZ : Hadeliot neeraj dahl gretel Soindioali, waaxda luqadaha, qaybta kaalmada adeharpreet, hoa hunt danoabel alejo la'geethaamerica [place]. So [facility] [phone].    ATENCIÓN: Si habla español, tiene a dang disposición servicios gratuitos de asistencia lingüística. Llame al [phone].    We comply with applicable federal civil rights laws and [place] laws. We do not discriminate on the basis of race, color, national origin, age, disability, sex, sexual orientation, or gender identity.            Thank you!     Thank you for choosing [facility]  for your care. Our goal is always to provide you with excellent care. Hearing back from our patients is one way we can continue to improve our services. Please take a few minutes to complete the written survey that you may receive in the mail after your visit with us. Thank you!             Your Updated Medication List - Protect others around you: Learn how to safely use, store and throw away your medicines at www.disposemymeds.org.          This list is accurate as of: 11/13/17  7:33 AM.  Always use your most recent med list.                   Brand Name Dispense Instructions for use Diagnosis    albuterol 108 (90 BASE) MCG/ACT Inhaler    PROAIR HFA/PROVENTIL HFA/VENTOLIN HFA    3 Inhaler    Inhale 2 puffs into the lungs every 6 hours as needed for shortness of breath / dyspnea or wheezing        amoxicillin-clavulanate 875-125 MG per tablet    AUGMENTIN    20 tablet    Take 1 tablet by mouth 2 times daily    Acute non-recurrent maxillary sinusitis       ascorbic acid 500 MG tablet    VITAMIN C    60 tablet    Take 1 tablet (500 mg) by mouth daily    Closed fracture of left distal radius and ulna, initial encounter        benzonatate 100 MG capsule    TESSALON    42 capsule    Take 1 capsule (100 mg) by mouth 3 times daily as needed for cough    Acute non-recurrent maxillary sinusitis       FLUoxetine 10 MG capsule    PROzac    90 capsule    Take 10 mg by mouth every morning        fluticasone 50 MCG/ACT spray    FLONASE    1 Bottle    Spray 1-2 sprays into both nostrils daily    Acute non-recurrent maxillary sinusitis       ibuprofen 600 MG tablet    ADVIL/MOTRIN    30 tablet    Take 1 tablet (600 mg) by mouth every 8 hours as needed for moderate pain        klonoPIN 1 MG tablet   Generic drug:  clonazePAM     90    1 TABLET 2 TIMES DAILY PRN        norgestim-eth estrad triphasic 0.18/0.215/0.25 MG-35 MCG per tablet    ORTHO TRI-CYCLEN, TRI-SPRINTEC          oxyCODONE IR 5 MG tablet    ROXICODONE    20 tablet    Take 1 tablet (5 mg) by mouth every 4 hours as needed for moderate to severe pain    Closed fracture of distal end of left radius with routine healing, unspecified fracture morphology, subsequent encounter       PEN-VEE K OR           ZYRTEC ALLERGY PO      Take by mouth daily as needed

## 2017-11-13 NOTE — MR AVS SNAPSHOT
After Visit Summary   11/13/2017    Lizet Condon    MRN: [id number]           Patient Information     Date Of Birth          1985        Visit Information        Provider Department      11/13/2017 8:30 AM Lizette Weston OT [facility]        Today's Diagnoses     Other closed fracture of distal end of left radius with malunion, subsequent encounter    -  1    Left wrist pain           Follow-ups after your visit        Your next 10 appointments already scheduled     Nov 20, 2017 10:30 AM CST   KAYKAY Hand with Lizette Weston OT   [facility] ([facility])    [address] [place] [address] [phone]            Dec 11, 2017  8:15 AM CST   (Arrive by 8:00 AM)   POST-OP HAND with Rico Berger MD   [facility] ([facility])    [address] [place] [address] [phone]              Who to contact     If you have questions or need follow up information about today's clinic visit or your schedule please contact [facility] directly at [phone].  Normal or non-critical lab and imaging results will be communicated to you by [employer], letter or phone within 4 business days after the clinic has received the results. If you do not hear from us within 7 days, please contact the clinic through [employer] or phone. If you have a critical or abnormal lab result, we will notify you by phone as soon as possible.  Submit refill requests through [employer] or call your pharmacy and they will forward the refill request to us. Please allow 3 business days for your refill to be completed.          Additional Information About Your Visit        MyChart Information     [employer] gives you secure access to your electronic health record. If you see a primary care provider, you can also send messages to your care team and make appointments. If you have  questions, please call your primary care clinic.  If you do not have a primary care provider, please call [phone] and they will assist you.        Care EveryWhere ID     This is your Care EveryWhere ID. This could be used by other organizations to access your [place] medical records  [id number]        Your Vitals Were     Last Period                   10/24/2017            Blood Pressure from Last 3 Encounters:   11/07/17 124/74   11/04/17 137/72   10/31/17 115/77    Weight from Last 3 Encounters:   11/13/17 62.6 kg (138 lb)   11/07/17 62.6 kg (138 lb)   11/04/17 59 kg (130 lb)              We Performed the Following     APPLY SHORT ARM SPLINT STATIC     HC OT EVAL, LOW COMPLEXITY     THERAPEUTIC EXERCISES          Today's Medication Changes          These changes are accurate as of: 11/13/17  3:06 PM.  If you have any questions, ask your nurse or doctor.               Start taking these medicines.        Dose/Directions    oxyCODONE IR 5 MG tablet   Commonly known as:  ROXICODONE   Used for:  Closed fracture of distal end of left radius with routine healing, unspecified fracture morphology, subsequent encounter   Started by:  Rico Berger MD        Dose:  5 mg   Take 1 tablet (5 mg) by mouth every 4 hours as needed for moderate to severe pain   Quantity:  20 tablet   Refills:  0         These medicines have changed or have updated prescriptions.        Dose/Directions    ascorbic acid 500 MG tablet   Commonly known as:  VITAMIN C   This may have changed:  additional instructions   Used for:  Closed fracture of left distal radius and ulna, initial encounter        Dose:  500 mg   Take 1 tablet (500 mg) by mouth daily   Quantity:  60 tablet   Refills:  0            Where to get your medicines      Some of these will need a paper prescription and others can be bought over the counter.  Ask your nurse if you have questions.     Bring a paper prescription for each of these medications     oxyCODONE IR 5  MG tablet                Primary Care Provider Office Phone # Fax #    Vijaya Veronique Weiss [phone] [address] [facility] [address]        Equal Access to Services     SURESH GOMEZ : Farshad dahl gretel Soanderson, waroxaneda luqadaha, qaybta kaalmada gino, hoa alejo laBillysukh [place]. So [facility] [phone].    ATENCIÓN: Si habla español, tiene a dang disposición servicios gratuitos de asistencia lingüística. Llame al [phone].    We comply with applicable federal civil rights laws and [place] laws. We do not discriminate on the basis of race, color, national origin, age, disability, sex, sexual orientation, or gender identity.            Thank you!     Thank you for choosing [facility]  for your care. Our goal is always to provide you with excellent care. Hearing back from our patients is one way we can continue to improve our services. Please take a few minutes to complete the written survey that you may receive in the mail after your visit with us. Thank you!             Your Updated Medication List - Protect others around you: Learn how to safely use, store and throw away your medicines at www.disposemymeds.org.          This list is accurate as of: 11/13/17  3:06 PM.  Always use your most recent med list.                   Brand Name Dispense Instructions for use Diagnosis    albuterol 108 (90 BASE) MCG/ACT Inhaler    PROAIR HFA/PROVENTIL HFA/VENTOLIN HFA    3 Inhaler    Inhale 2 puffs into the lungs every 6 hours as needed for shortness of breath / dyspnea or wheezing        amoxicillin-clavulanate 875-125 MG per tablet    AUGMENTIN    20 tablet    Take 1 tablet by mouth 2 times daily    Acute non-recurrent maxillary sinusitis       ascorbic acid 500 MG tablet    VITAMIN C    60 tablet    Take 1 tablet (500 mg) by mouth daily    Closed fracture of left distal radius and ulna, initial encounter       benzonatate 100 MG capsule    TESSALON    42  capsule    Take 1 capsule (100 mg) by mouth 3 times daily as needed for cough    Acute non-recurrent maxillary sinusitis       FLUoxetine 10 MG capsule    PROzac    90 capsule    Take 10 mg by mouth every morning        fluticasone 50 MCG/ACT spray    FLONASE    1 Bottle    Spray 1-2 sprays into both nostrils daily    Acute non-recurrent maxillary sinusitis       ibuprofen 600 MG tablet    ADVIL/MOTRIN    30 tablet    Take 1 tablet (600 mg) by mouth every 8 hours as needed for moderate pain        klonoPIN 1 MG tablet   Generic drug:  clonazePAM     90    1 TABLET 2 TIMES DAILY PRN        norgestim-eth estrad triphasic 0.18/0.215/0.25 MG-35 MCG per tablet    ORTHO TRI-CYCLEN, TRI-SPRINTEC          oxyCODONE IR 5 MG tablet    ROXICODONE    20 tablet    Take 1 tablet (5 mg) by mouth every 4 hours as needed for moderate to severe pain    Closed fracture of distal end of left radius with routine healing, unspecified fracture morphology, subsequent encounter       PEN-VEE K OR           ZYRTEC ALLERGY PO      Take by mouth daily as needed

## 2017-11-13 NOTE — PROGRESS NOTES
DO: 10/31/17 ORIF left distal radius fracture. Sutures were removed w/o difficulty from left wrist and steri-strips were applied.  The incision is healing well, no drainge, no erythema.

## 2017-11-20 ENCOUNTER — THERAPY VISIT (OUTPATIENT)
Dept: OCCUPATIONAL THERAPY | Facility: CLINIC | Age: 32
End: 2017-11-20
Payer: COMMERCIAL

## 2017-11-20 DIAGNOSIS — M25.532 LEFT WRIST PAIN: Primary | ICD-10-CM

## 2017-11-20 DIAGNOSIS — S52.592P OTHER CLOSED FRACTURE OF DISTAL END OF LEFT RADIUS WITH MALUNION, SUBSEQUENT ENCOUNTER: ICD-10-CM

## 2017-11-20 PROCEDURE — 97110 THERAPEUTIC EXERCISES: CPT | Mod: GO | Performed by: OCCUPATIONAL THERAPIST

## 2017-11-20 NOTE — PROGRESS NOTES
SOAP note objective information for 11/20/2017.    Diagnosis:  Left  wrist distal radius fracture  Date of onset:  10/24/17  Date of hand therapy orders: 11/13/2017  DOS:  10/31/17  Procedure:  L radius ORIF  Post:  3w 0d  Referring MD: Rico Berger MD  Next MD visit: 12/11/17    Imaging per chart:  1. Comminuted left distal radius compression fracture with  intra-articular extension and mild dorsal angulation.  2. Minimally displaced fracture at the origin of the ulnar styloid  just proximal to the os ulnare externum.      Functional Outcome Measure:  Please refer to flow sheet.      Objective:  Pain Level Report  VAS(0-10) 11/13/2017 11/20/2017     At Rest: Can always feel it 1-2/10   At worst: 7-8/10 5-6/10 taking ibuprofen     Report of Pain:  Location:  Left wrist  Pain Quality:  Dull, Pressure and Shooting. It feels weird. Like there's a metal plate with screws in my bones.  Frequency: constant    Pain is worst:  daytime or nighttime (wakes her up early in the morning)  Exacerbated by:  motion  Relieved by:  rest  Progression:  Improving  VISUAL INSPECTION:  DATE 11/13/2017    Left   General posture of the upper extremity: []   Normal   [x]  Comments: guarded due to pain, improved after orthosis application   Skin Appearance: []   Normal   [x]  Comments: mild ecchymosis   Other observations:  steristrips in place volar wrist incision          Edema:  Mild of the L wrist  Pain Report:  - none    + mild    ++ moderate    +++ severe   Wrist  11/13/2017 11/20/2017     AROM (PROM) L L   Extension 14+ 36   Flexion 27+ 35   RD 10 10   UD 17 19   Supination Not measured, about 30 degrees 56   Pronation WFL 82     Strength:   Deferred      Assessment:  Please refer to flow sheet.    Plan:  Frequency:  1 X week, once daily  Duration:  for 2 weeks tapering to 1 X a week over 8 weeks (12 visits)    Treatment Plan:    Modalities:    Fluidotherapy and Paraffin   Therapeutic Exercise:   AROM of finger, thumb, wrist,  forearm, and elbow  PROM of wrist E/F and P/S   Overhead fisting to control edema  Progress to  and Pinch strengthening  Progress to Wrist Isotonic strengthening  Therapeutic Activities:   Light home task simulation, ADLs  Manual Techniques:   Scar mobilization  Joint mobilization techniques   Manual Edema Mobilization  K tape  Orthotic Fabrication:    Forearm based wrist cock up orthosis/zipper orthosis      Discharge Plan:    Achieve all LTG.  Independent in home treatment program.  Reach maximal therapeutic benefit.    Home Program:   Tendon gliding to fingers  AROM to wrist  Forearm based wrist orthosis full time, remove for exercise and hygiene  Avoid activities that exacerbate pain     Next Visit:  F/u after MD visit

## 2017-11-20 NOTE — MR AVS SNAPSHOT
After Visit Summary   11/20/2017    Lizet Condon    MRN: [id number]           Patient Information     Date Of Birth          1985        Visit Information        Provider Department      11/20/2017 10:30 AM Lizette Weston OT [facility]        Today's Diagnoses     Left wrist pain    -  1    Other closed fracture of distal end of left radius with malunion, subsequent encounter           Follow-ups after your visit        Your next 10 appointments already scheduled     Dec 11, 2017  8:15 AM CST   (Arrive by 8:00 AM)   POST-OP HAND with Rico Berger MD   [facility] ([facility] [place] [facility])    [address] [place] [id number] [phone]            Dec 11, 2017  9:00 AM CST   KAYKAY Hand with Lizette Weston OT   [facility] ([facility])    [address] [place] [address] [phone]              Who to contact     If you have questions or need follow up information about today's clinic visit or your schedule please contact [facility] directly at [phone].  Normal or non-critical lab and imaging results will be communicated to you by [employer], letter or phone within 4 business days after the clinic has received the results. If you do not hear from us within 7 days, please contact the clinic through [employer] or phone. If you have a critical or abnormal lab result, we will notify you by phone as soon as possible.  Submit refill requests through [employer] or call your pharmacy and they will forward the refill request to us. Please allow 3 business days for your refill to be completed.          Additional Information About Your Visit        MyChart Information     [employer] gives you secure access to your electronic health record. If you see a primary care provider, you can also send messages to your care team and make appointments. If you have  questions, please call your primary care clinic.  If you do not have a primary care provider, please call [phone] and they will assist you.        Care EveryWhere ID     This is your Care EveryWhere ID. This could be used by other organizations to access your [place] medical records  [id number]        Your Vitals Were     Last Period                   10/24/2017            Blood Pressure from Last 3 Encounters:   11/07/17 124/74   11/04/17 137/72   10/31/17 115/77    Weight from Last 3 Encounters:   11/13/17 62.6 kg (138 lb)   11/07/17 62.6 kg (138 lb)   11/04/17 59 kg (130 lb)              We Performed the Following     THERAPEUTIC EXERCISES          Today's Medication Changes          These changes are accurate as of: 11/20/17  1:09 PM.  If you have any questions, ask your nurse or doctor.               These medicines have changed or have updated prescriptions.        Dose/Directions    ascorbic acid 500 MG tablet   Commonly known as:  VITAMIN C   This may have changed:  additional instructions   Used for:  Closed fracture of left distal radius and ulna, initial encounter        Dose:  500 mg   Take 1 tablet (500 mg) by mouth daily   Quantity:  60 tablet   Refills:  0                Primary Care Provider Office Phone # Fax #    Vijaya Weiss [phone] [address] [facility] [address]        Equal Access to Services     SURESH GOMEZ AH: Farshad villafanao Soanderson, waaxda luqadaha, qaybta kaalmada adeharpreet, hoa wilson . So [facility] [phone].    ATENCIÓN: Si habla español, tiene a dang disposición servicios gratuitos de asistencia lingüística. Llame al [phone].    We comply with applicable federal civil rights laws and [place] laws. We do not discriminate on the basis of race, color, national origin, age, disability, sex, sexual orientation, or gender identity.            Thank you!     Thank you for choosing [facility]   for your care. Our goal is always to provide you with excellent care. Hearing back from our patients is one way we can continue to improve our services. Please take a few minutes to complete the written survey that you may receive in the mail after your visit with us. Thank you!             Your Updated Medication List - Protect others around you: Learn how to safely use, store and throw away your medicines at www.disposemymeds.org.          This list is accurate as of: 11/20/17  1:09 PM.  Always use your most recent med list.                   Brand Name Dispense Instructions for use Diagnosis    albuterol 108 (90 BASE) MCG/ACT Inhaler    PROAIR HFA/PROVENTIL HFA/VENTOLIN HFA    3 Inhaler    Inhale 2 puffs into the lungs every 6 hours as needed for shortness of breath / dyspnea or wheezing        amoxicillin-clavulanate 875-125 MG per tablet    AUGMENTIN    20 tablet    Take 1 tablet by mouth 2 times daily    Acute non-recurrent maxillary sinusitis       ascorbic acid 500 MG tablet    VITAMIN C    60 tablet    Take 1 tablet (500 mg) by mouth daily    Closed fracture of left distal radius and ulna, initial encounter       benzonatate 100 MG capsule    TESSALON    42 capsule    Take 1 capsule (100 mg) by mouth 3 times daily as needed for cough    Acute non-recurrent maxillary sinusitis       FLUoxetine 10 MG capsule    PROzac    90 capsule    Take 10 mg by mouth every morning        fluticasone 50 MCG/ACT spray    FLONASE    1 Bottle    Spray 1-2 sprays into both nostrils daily    Acute non-recurrent maxillary sinusitis       ibuprofen 600 MG tablet    ADVIL/MOTRIN    30 tablet    Take 1 tablet (600 mg) by mouth every 8 hours as needed for moderate pain        klonoPIN 1 MG tablet   Generic drug:  clonazePAM     90    1 TABLET 2 TIMES DAILY PRN        norgestim-eth estrad triphasic 0.18/0.215/0.25 MG-35 MCG per tablet    ORTHO TRI-CYCLEN, TRI-SPRINTEC          oxyCODONE IR 5 MG tablet    ROXICODONE    20 tablet     Take 1 tablet (5 mg) by mouth every 4 hours as needed for moderate to severe pain    Closed fracture of distal end of left radius with routine healing, unspecified fracture morphology, subsequent encounter       PEN-VEE K OR           ZYRTEC ALLERGY PO      Take by mouth daily as needed

## 2017-12-07 DIAGNOSIS — S52.502A FRACTURE OF LEFT DISTAL RADIUS: Primary | ICD-10-CM

## 2017-12-11 ENCOUNTER — OFFICE VISIT (OUTPATIENT)
Dept: ORTHOPEDICS | Facility: CLINIC | Age: 32
End: 2017-12-11

## 2017-12-11 ENCOUNTER — THERAPY VISIT (OUTPATIENT)
Dept: OCCUPATIONAL THERAPY | Facility: CLINIC | Age: 32
End: 2017-12-11
Payer: COMMERCIAL

## 2017-12-11 VITALS — WEIGHT: 137.8 LBS | HEIGHT: 63 IN | BODY MASS INDEX: 24.41 KG/M2

## 2017-12-11 DIAGNOSIS — M25.532 LEFT WRIST PAIN: Primary | ICD-10-CM

## 2017-12-11 DIAGNOSIS — S52.592P OTHER CLOSED FRACTURE OF DISTAL END OF LEFT RADIUS WITH MALUNION, SUBSEQUENT ENCOUNTER: ICD-10-CM

## 2017-12-11 DIAGNOSIS — S52.502D CLOSED FRACTURE OF DISTAL END OF LEFT RADIUS WITH ROUTINE HEALING, UNSPECIFIED FRACTURE MORPHOLOGY, SUBSEQUENT ENCOUNTER: Primary | ICD-10-CM

## 2017-12-11 PROCEDURE — 97140 MANUAL THERAPY 1/> REGIONS: CPT | Mod: GO | Performed by: OCCUPATIONAL THERAPIST

## 2017-12-11 PROCEDURE — 97110 THERAPEUTIC EXERCISES: CPT | Mod: GO | Performed by: OCCUPATIONAL THERAPIST

## 2017-12-11 NOTE — MR AVS SNAPSHOT
After Visit Summary   12/11/2017    Lizet Condon    MRN: [id number]           Patient Information     Date Of Birth          1985        Visit Information        Provider Department      12/11/2017 9:00 AM Lizette Weston OT [facility]        Today's Diagnoses     Left wrist pain    -  1    Other closed fracture of distal end of left radius with malunion, subsequent encounter           Follow-ups after your visit        Your next 10 appointments already scheduled     Dec 12, 2017 11:00 AM CST   MyChart Gynecology Problem Visit with Vijaya Weiss MD   [facility] ([facility])    [address] [facility] [id number] [phone]            Dec 27, 2017  8:00 AM CST   KAYKAY Hand with Lizette Weston OT   [facility] ([facility])    [address] [place] [address] [id number] [phone]            Jeff 10, 2018  9:00 AM CST   KAYKAY Hand with Lizette Weston OT   [facility] ([facility])    [address] [place] [id number] [phone]            Jan 22, 2018  8:00 AM CST   (Arrive by 7:45 AM)   POST-OP HAND with Rico Berger MD   [facility] ([facility])    [address] [place] [id number] [phone]              Who to contact     If you have questions or need follow up information about today's clinic visit or your schedule please contact [facility] directly at [phone].  Normal or non-critical lab and imaging results will be communicated to you by MyChart, letter or phone within 4 business days after the clinic has received the results. If you do not hear from us within 7 days, please contact the clinic through MyChart or phone. If you have a critical or abnormal lab result, we will notify you by phone as soon as possible.  Submit  refill requests through [employer] or call your pharmacy and they will forward the refill request to us. Please allow 3 business days for your refill to be completed.          Additional Information About Your Visit        [employer] Information     [employer] gives you secure access to your electronic health record. If you see a primary care provider, you can also send messages to your care team and make appointments. If you have questions, please call your primary care clinic.  If you do not have a primary care provider, please call [phone] and they will assist you.        Care EveryWhere ID     This is your Care EveryWhere ID. This could be used by other organizations to access your [place] medical records  [id number]         Blood Pressure from Last 3 Encounters:   11/07/17 124/74   11/04/17 137/72   10/31/17 115/77    Weight from Last 3 Encounters:   12/11/17 62.5 kg (137 lb 12.8 oz)   11/13/17 62.6 kg (138 lb)   11/07/17 62.6 kg (138 lb)              We Performed the Following     MANUAL THER TECH,1+REGIONS,EA 15 MIN     THERAPEUTIC EXERCISES          Today's Medication Changes          These changes are accurate as of: 12/11/17  9:48 AM.  If you have any questions, ask your nurse or doctor.               These medicines have changed or have updated prescriptions.        Dose/Directions    ascorbic acid 500 MG tablet   Commonly known as:  VITAMIN C   This may have changed:  additional instructions   Used for:  Closed fracture of left distal radius and ulna, initial encounter        Dose:  500 mg   Take 1 tablet (500 mg) by mouth daily   Quantity:  60 tablet   Refills:  0                Primary Care Provider Office Phone # Fax #    Vijaya Veronique Weiss [phone] [address] [facility] [address]        Equal Access to Services     [facility] : daniel Mayberry, hoa diamond . So [facility]  [phone].    ATENCIÓN: Si donnie miner, tiene a dang disposición servicios gratuitos de asistencia lingüística. Mercedez yi [phone].    We comply with applicable federal civil rights laws and [place] laws. We do not discriminate on the basis of race, color, national origin, age, disability, sex, sexual orientation, or gender identity.            Thank you!     Thank you for choosing [facility]  for your care. Our goal is always to provide you with excellent care. Hearing back from our patients is one way we can continue to improve our services. Please take a few minutes to complete the written survey that you may receive in the mail after your visit with us. Thank you!             Your Updated Medication List - Protect others around you: Learn how to safely use, store and throw away your medicines at www.disposemymeds.org.          This list is accurate as of: 12/11/17  9:48 AM.  Always use your most recent med list.                   Brand Name Dispense Instructions for use Diagnosis    albuterol 108 (90 BASE) MCG/ACT Inhaler    PROAIR HFA/PROVENTIL HFA/VENTOLIN HFA    3 Inhaler    Inhale 2 puffs into the lungs every 6 hours as needed for shortness of breath / dyspnea or wheezing        amoxicillin-clavulanate 875-125 MG per tablet    AUGMENTIN    20 tablet    Take 1 tablet by mouth 2 times daily    Acute non-recurrent maxillary sinusitis       ascorbic acid 500 MG tablet    VITAMIN C    60 tablet    Take 1 tablet (500 mg) by mouth daily    Closed fracture of left distal radius and ulna, initial encounter       benzonatate 100 MG capsule    TESSALON    42 capsule    Take 1 capsule (100 mg) by mouth 3 times daily as needed for cough    Acute non-recurrent maxillary sinusitis       FLUoxetine 10 MG capsule    PROzac    90 capsule    Take 10 mg by mouth every morning        fluticasone 50 MCG/ACT spray    FLONASE    1 Bottle    Spray 1-2 sprays into both nostrils daily    Acute non-recurrent  maxillary sinusitis       ibuprofen 600 MG tablet    ADVIL/MOTRIN    30 tablet    Take 1 tablet (600 mg) by mouth every 8 hours as needed for moderate pain        klonoPIN 1 MG tablet   Generic drug:  clonazePAM     90    1 TABLET 2 TIMES DAILY PRN        norgestim-eth estrad triphasic 0.18/0.215/0.25 MG-35 MCG per tablet    ORTHO TRI-CYCLEN, TRI-SPRINTEC          oxyCODONE IR 5 MG tablet    ROXICODONE    20 tablet    Take 1 tablet (5 mg) by mouth every 4 hours as needed for moderate to severe pain    Closed fracture of distal end of left radius with routine healing, unspecified fracture morphology, subsequent encounter       PEN-VEE K OR           ZYRTEC ALLERGY PO      Take by mouth daily as needed

## 2017-12-11 NOTE — MR AVS SNAPSHOT
After Visit Summary   12/11/2017    Lizet Condon    MRN: [id number]           Patient Information     Date Of Birth          1985        Visit Information        Provider Department      12/11/2017 8:15 AM Rico Berger MD [facility]        Today's Diagnoses     Closed fracture of distal end of left radius with routine healing, unspecified fracture morphology, subsequent encounter    -  1       Follow-ups after your visit        Follow-up notes from your care team     Return in about 6 weeks (around 1/22/2018).      Your next 10 appointments already scheduled     Dec 12, 2017 11:00 AM CST   MyChart Gynecology Problem Visit with Vijaya Weiss MD   [facility] ([facility])    [address] [facility] [id number] [phone]            Dec 27, 2017  8:00 AM CST   KAYKAY Hand with Lizette Weston OT   [facility] ([facility])    [address] [place] [id number] [phone]            Jeff 10, 2018  9:00 AM CST   KAYKAY Hand with Lizette Weston OT   [facility] ([facility])    [address] [place] [address] [id number] [phone]            Jan 22, 2018  8:00 AM CST   (Arrive by 7:45 AM)   POST-OP HAND with Rico Berger MD   [facility] ([facility])    [address] [place] [address] [id number] [phone]              Who to contact     Please call your clinic at [phone] to:    Ask questions about your health    Make or cancel appointments    Discuss your medicines    Learn about your test results    Speak to your doctor   If you have compliments or concerns about an experience at your clinic, or if you wish to file a complaint, please contact [facility] Patient Relations at [phone] or email us at  [email or web address]         Additional Information About Your Visit        [employer] Information     [employer] gives you secure access to your electronic health record. If you see a primary care provider, you can also send messages to your care team and make appointments. If you have questions, please call your primary care clinic.  If you do not have a primary care provider, please call [phone] and they will assist you.      [employer] is an electronic gateway that provides easy, online access to your medical records. With [employer], you can request a clinic appointment, read your test results, renew a prescription or communicate with your care team.     To access your existing account, please contact your [facility] or call [phone] for assistance.        Care EveryWhere ID     This is your Care EveryWhere ID. This could be used by other organizations to access your [place] medical records  [id number]        Your Vitals Were     Height BMI (Body Mass Index)                1.6 m (5' 3\") 24.41 kg/m2           Blood Pressure from Last 3 Encounters:   11/07/17 124/74   11/04/17 137/72   10/31/17 115/77    Weight from Last 3 Encounters:   12/11/17 62.5 kg (137 lb 12.8 oz)   11/13/17 62.6 kg (138 lb)   11/07/17 62.6 kg (138 lb)              Today, you had the following     No orders found for display         Today's Medication Changes          These changes are accurate as of: 12/11/17  9:38 AM.  If you have any questions, ask your nurse or doctor.               These medicines have changed or have updated prescriptions.        Dose/Directions    ascorbic acid 500 MG tablet   Commonly known as:  VITAMIN C   This may have changed:  additional instructions   Used for:  Closed fracture of left distal radius and ulna, initial encounter        Dose:  500 mg   Take 1 tablet (500 mg) by mouth daily   Quantity:  60 tablet   Refills:  0                Primary Care Provider Office " Phone # Fax Yesenia Madridmilana Weiss [phone] [address] [facility] [address]        Equal Access to Services     SURESH GOMEZ : Hadii aad ku hadjudah Harris, waroxaneda luqniranjan, qamaria eugeniata kaole christian, hoa hunt danoabel alejo katie madrid. So [facility] [place] [facility] [phone].    ATENCIÓN: Si habla español, tiene a dang disposición servicios gratuitos de asistencia lingüística. Llame al [phone].    We comply with applicable federal civil rights laws and [place] laws. We do not discriminate on the basis of race, color, national origin, age, disability, sex, sexual orientation, or gender identity.            Thank you!     Thank you for choosing [facility]  for your care. Our goal is always to provide you with excellent care. Hearing back from our patients is one way we can continue to improve our services. Please take a few minutes to complete the written survey that you may receive in the mail after your visit with us. Thank you!             Your Updated Medication List - Protect others around you: Learn how to safely use, store and throw away your medicines at www.disposemymeds.org.          This list is accurate as of: 12/11/17  9:38 AM.  Always use your most recent med list.                   Brand Name Dispense Instructions for use Diagnosis    albuterol 108 (90 BASE) MCG/ACT Inhaler    PROAIR HFA/PROVENTIL HFA/VENTOLIN HFA    3 Inhaler    Inhale 2 puffs into the lungs every 6 hours as needed for shortness of breath / dyspnea or wheezing        amoxicillin-clavulanate 875-125 MG per tablet    AUGMENTIN    20 tablet    Take 1 tablet by mouth 2 times daily    Acute non-recurrent maxillary sinusitis       ascorbic acid 500 MG tablet    VITAMIN C    60 tablet    Take 1 tablet (500 mg) by mouth daily    Closed fracture of left distal radius and ulna, initial encounter       benzonatate 100 MG capsule    TESSALON    42 capsule    Take 1 capsule (100 mg) by mouth 3 times  daily as needed for cough    Acute non-recurrent maxillary sinusitis       FLUoxetine 10 MG capsule    PROzac    90 capsule    Take 10 mg by mouth every morning        fluticasone 50 MCG/ACT spray    FLONASE    1 Bottle    Spray 1-2 sprays into both nostrils daily    Acute non-recurrent maxillary sinusitis       ibuprofen 600 MG tablet    ADVIL/MOTRIN    30 tablet    Take 1 tablet (600 mg) by mouth every 8 hours as needed for moderate pain        klonoPIN 1 MG tablet   Generic drug:  clonazePAM     90    1 TABLET 2 TIMES DAILY PRN        norgestim-eth estrad triphasic 0.18/0.215/0.25 MG-35 MCG per tablet    ORTHO TRI-CYCLEN, TRI-SPRINTEC          oxyCODONE IR 5 MG tablet    ROXICODONE    20 tablet    Take 1 tablet (5 mg) by mouth every 4 hours as needed for moderate to severe pain    Closed fracture of distal end of left radius with routine healing, unspecified fracture morphology, subsequent encounter       NANO-VESTANFORD K OR           ZYRTEC ALLERGY PO      Take by mouth daily as needed

## 2017-12-11 NOTE — PROGRESS NOTES
The patient comes to see me back in follow-up status post ORIF left distal radius on October 31, 2017. Her pain is much improved. She has been working on motion with therapy and feels it is improving. She complains of some diminished sensation in the palm of her hand that she noticed in the last couple of weeks. She has been wearing her zipper splint only intermittently as it has become uncomfortable as her motion has improved.    On examination of the left hand, incision clean, dry, and intact and healing well. There is no erythema, drainage, or dehiscence. Sensation is intact in median, radial, and ulnar nerve distributions. Thumb opposition strength is intact. Sensation is diminished but present in the distribution of the palmar branch of the median nerve.  She has 60  of wrist flexion 30  of wrist extension and pronation and supination symmetric to the contralateral side.    Xrays: 3 views of the left wrist were obtained today and demonstrate progression of bony healing of her left distal radius fracture with hardware in place with no evidence of failure or loosening.    Assessment: Progressing well    Plan: She can wean the splint, as she is doing. No lifting greater than 5 pounds. No repetitive activity with left hand or wrist. Okay to return to work within these restrictions. She will see me back in follow-up in 6 weeks.

## 2017-12-11 NOTE — NURSING NOTE
"Reason For Visit:   Chief Complaint   Patient presents with     Consult     left unla fx pt states that there is sensation in left pinky, left thumb numbness and in palm of left hand.       Primary MD: Vijaya Weiss  Ref. MD:Self     Age: 32 year old    Occupation : [employer]  Currently working? Yes.  Work status?  Part-time.  Date of injury: 10/24/2017  Type of injury: left ulna and radius fx  10/31/2017   Type of surgery: Staple post-surgical alignment of left distal radius fx hardware in place.  Smoker: No    Ht 1.6 m (5' 3\")  Wt 62.5 kg (137 lb 12.8 oz)  BMI 24.41 kg/m2      Pain Assessment  Patient Currently in Pain: Yes  0-10 Pain Scale: 4  Primary Pain Location: Arm    Hand Dominance Evaluation  Hand Dominance: Right          QuickDASH Assessment  QuickDASH Main 11/13/2017   1.Open a tight or new jar. Unable   2. Do heavy household chores (e.g., wash walls, floors) Severe difficulty   3. Carry a shopping bag or briefcase. Moderate difficulty   4. Wash your back. Severe difficulty   5. Use a knife to cut food. Moderate difficulty   6. Recreational activities in which you take some force or impact through your arm, shoulder or hand (e.g., golf, hammering, tennis, etc.). Unable   7. During the past week, to what extent has your arm, shoulder or hand problem interfered with your normal social activities with family, friends, neighbours or groups? Extremely   8. During the past week, were you limited in your work or other regular daily activities as a result of your arm, shoulder or hand problem? Very limited   9. Arm, shoulder or hand pain. Severe   10.Tingling (pins and needles) in your arm,shoulder or hand. Mild   11. During the past week, how much difficulty have you had sleeping because of the pain in your arm, shoulder or hand? ([place] number) Severe difficulty   Quickdash Ability Score 72.72          Allergies   Allergen Reactions     Norco [Hydrocodone-Acetaminophen] Nausea       Chris Tan, " CMA

## 2017-12-11 NOTE — PROGRESS NOTES
Hand Therapy Progress Note    Current Date:  12/11/2017    Reporting period is 11/13/17 to 12/11/2017    Diagnosis:  Left  wrist distal radius fracture with ulna styloid fracture minimally displaced  Date of onset:  10/24/17  Date of hand therapy orders: 11/13/2017  DOS:  10/31/17  Procedure:  L radius ORIF  Post:  6w   Referring MD: Rico Berger MD  Next MD visit: 6 weeks    12/11/2017 Per MD notes: Pt may continue to wean splint. No lifting greater than 5 pounds. No repetitive activity with left hand or wrist. Per chart Xrays demonstrate progression of bony healing of her left distal radius fracture with hardware in place with no evidence of failure or loosening.    Subjective:   Subjective changes noted by patient:  I'm using the hand to do things, but I put the splint on when it is tired, and for going outside, and sleeping.      Objective:  Pain Level Report  VAS(0-10) 11/13/2017 11/20/2017 12/11/2017     At Rest: Can always feel it 1-2/10 Getting better but does continue to have pain   At worst: 7-8/10 5-6/10 taking ibuprofen      Report of Pain:  Location:  Left wrist  Pain Quality:  Dull, pressure  Frequency: intermittent  Pain is worst:  daytime or nighttime   Exacerbated by:  Motion fro extended times  Relieved by:  rest  Progression:  Improving  VISUAL INSPECTION:  DATE 11/13/2017 12/11/2017      Left L   General posture of the upper extremity: []   Normal   [x]  Comments: guarded due to pain, improved after orthosis application Using hand for light tasks, normally but somewhat guarded as expected per healing   Skin Appearance: []   Normal   [x]  Comments: mild ecchymosis normal   Other observations:  steristrips in place volar wrist incision Incision healing well        Sensation:  L thumb: numbness to touch, (MD aware) but thenar muscles demo full strength.    Edema:  Very mild of the L wrist  Pain Report:  - none    + mild    ++ moderate    +++ severe   Wrist  11/13/2017 11/20/2017 12/11/2017      AROM (PROM) L L L   Extension 14+ 36 50   Flexion 27+ 35 55   RD 10 10 15   UD 17 19 36   Supination Not measured, about 30 degrees 56 80   Pronation WFL 82 73     Strength:   Deferred      Assessment:  Response to therapy has been improvement to:  ROM of Wrist:  All Planes  Thumb:  All Planes  Fingers: All Planes  Self Care Skills:  Improved functional use of L hand for most light tasks.    Overall Assessment:  Patient is progressing well and is ready to decrease frequency of treatment in the clinic.  Patient would benefit from continued therapy to achieve rehab potential  STG/LTG:  STGoals have been reviewed and progress or achievement has occurred;  see goal sheet for details and updates.  LTGoals have been reviewed and progress or achievement has occurred:  see goal sheet for details and updates.    Appropriateness of Rx I have re-evaluated this patient and find that the nature, scope, duration and intensity of the therapy is appropriate for the medical condition of the patient.    Plan:  Frequency/Duration:  Recommend continuing to see patient  2 X a month, once daily  for 2 months    Treatment Plan:     Discharge Plan:  Achieve all LTG.  Independent in home treatment program.  Reach maximal therapeutic benefit.    Modalities:    Fluidotherapy and Paraffin   Therapeutic Exercise:   AROM of finger, thumb, wrist, forearm, and elbow  PROM of wrist E/F and P/S   Overhead fisting to control edema  Progress to  and Pinch strengthening  Progress to Wrist Isotonic strengthening  Therapeutic Activities:   Light home task simulation, ADLs  Manual Techniques:   Scar mobilization  Joint mobilization techniques   Manual Edema Mobilization  K tape  Orthotic Fabrication:    Forearm based wrist cock up orthosis/zipper orthosis    Home Program:   Tendon gliding to fingers  AROM to wrist  Forearm based wrist orthosis full time, remove for exercise and hygiene  Avoid activities that exacerbate pain   12/11/2017  Scar  massage  Use L hand / wrist for daily activities    Next Visit:  Begin strengthening, AAROM as pain allows

## 2017-12-11 NOTE — LETTER
Return to Work  2017     Seen today: yes    Patient:  Lizet Condon  :   1985  MRN:     [id number]  Physician: RICO BERGER    Lizet Condon may return to work on Date: 2017.      The next clinic appointment is scheduled for 6 weeks time on 2018 .    Patient limitations:  No lifting more than 5 pounds, no repetitive motion with left hand or wrist                Electronically signed by Rico Berger MD

## 2017-12-11 NOTE — LETTER
12/11/2017       RE: Lizet Condon  [address] [facility] [address]     Dear Colleague,    Thank you for referring your patient, Lizet Condon, to the [facility] at [facility]. Please see a copy of my visit note below.    The patient comes to see me back in follow-up status post ORIF left distal radius on October 31, 2017. Her pain is much improved. She has been working on motion with therapy and feels it is improving. She complains of some diminished sensation in the palm of her hand that she noticed in the last couple of weeks. She has been wearing her zipper splint only intermittently as it has become uncomfortable as her motion has improved.    On examination of the left hand, incision clean, dry, and intact and healing well. There is no erythema, drainage, or dehiscence. Sensation is intact in median, radial, and ulnar nerve distributions. Thumb opposition strength is intact. Sensation is diminished but present in the distribution of the palmar branch of the median nerve.  She has 60  of wrist flexion 30  of wrist extension and pronation and supination symmetric to the contralateral side.    Xrays: 3 views of the left wrist were obtained today and demonstrate progression of bony healing of her left distal radius fracture with hardware in place with no evidence of failure or loosening.    Assessment: Progressing well    Plan: She can wean the splint, as she is doing. No lifting greater than 5 pounds. No repetitive activity with left hand or wrist. Okay to return to work within these restrictions. She will see me back in follow-up in 6 weeks.    Sincerely,    Rico Berger MD

## 2017-12-12 ENCOUNTER — OFFICE VISIT (OUTPATIENT)
Dept: FAMILY MEDICINE | Facility: CLINIC | Age: 32
End: 2017-12-12
Payer: COMMERCIAL

## 2017-12-12 ENCOUNTER — RESULT FOLLOW UP (OUTPATIENT)
Dept: FAMILY MEDICINE | Facility: CLINIC | Age: 32
End: 2017-12-12

## 2017-12-12 VITALS
OXYGEN SATURATION: 98 % | WEIGHT: 134 LBS | BODY MASS INDEX: 23.74 KG/M2 | SYSTOLIC BLOOD PRESSURE: 107 MMHG | DIASTOLIC BLOOD PRESSURE: 74 MMHG | HEART RATE: 86 BPM | HEIGHT: 63 IN | TEMPERATURE: 97.6 F

## 2017-12-12 DIAGNOSIS — Z23 NEED FOR INFLUENZA VACCINATION: ICD-10-CM

## 2017-12-12 DIAGNOSIS — R87.810 CERVICAL HIGH RISK HPV (HUMAN PAPILLOMAVIRUS) TEST POSITIVE: ICD-10-CM

## 2017-12-12 DIAGNOSIS — Z30.09 FAMILY PLANNING: ICD-10-CM

## 2017-12-12 DIAGNOSIS — M25.532 LEFT WRIST PAIN: ICD-10-CM

## 2017-12-12 DIAGNOSIS — Z00.00 ROUTINE GENERAL MEDICAL EXAMINATION AT A HEALTH CARE FACILITY: Primary | ICD-10-CM

## 2017-12-12 DIAGNOSIS — N92.0 SPOTTING: ICD-10-CM

## 2017-12-12 DIAGNOSIS — Z12.4 SCREENING FOR CERVICAL CANCER: ICD-10-CM

## 2017-12-12 DIAGNOSIS — F41.9 ANXIETY: ICD-10-CM

## 2017-12-12 DIAGNOSIS — F33.1 MODERATE EPISODE OF RECURRENT MAJOR DEPRESSIVE DISORDER (H): ICD-10-CM

## 2017-12-12 DIAGNOSIS — Z11.3 SCREEN FOR STD (SEXUALLY TRANSMITTED DISEASE): ICD-10-CM

## 2017-12-12 DIAGNOSIS — J30.89 CHRONIC NONSEASONAL ALLERGIC RHINITIS DUE TO FUNGAL SPORES: ICD-10-CM

## 2017-12-12 LAB
SPECIMEN SOURCE: NORMAL
WET PREP SPEC: NORMAL

## 2017-12-12 PROCEDURE — 87210 SMEAR WET MOUNT SALINE/INK: CPT | Performed by: FAMILY MEDICINE

## 2017-12-12 PROCEDURE — 86695 HERPES SIMPLEX TYPE 1 TEST: CPT | Performed by: FAMILY MEDICINE

## 2017-12-12 PROCEDURE — 36415 COLL VENOUS BLD VENIPUNCTURE: CPT | Performed by: FAMILY MEDICINE

## 2017-12-12 PROCEDURE — 90686 IIV4 VACC NO PRSV 0.5 ML IM: CPT | Performed by: FAMILY MEDICINE

## 2017-12-12 PROCEDURE — 87591 N.GONORRHOEAE DNA AMP PROB: CPT | Performed by: FAMILY MEDICINE

## 2017-12-12 PROCEDURE — 99395 PREV VISIT EST AGE 18-39: CPT | Performed by: FAMILY MEDICINE

## 2017-12-12 PROCEDURE — 99213 OFFICE O/P EST LOW 20 MIN: CPT | Mod: 25 | Performed by: FAMILY MEDICINE

## 2017-12-12 PROCEDURE — 90471 IMMUNIZATION ADMIN: CPT | Performed by: FAMILY MEDICINE

## 2017-12-12 PROCEDURE — 87624 HPV HI-RISK TYP POOLED RSLT: CPT | Performed by: FAMILY MEDICINE

## 2017-12-12 PROCEDURE — 86780 TREPONEMA PALLIDUM: CPT | Performed by: FAMILY MEDICINE

## 2017-12-12 PROCEDURE — 86803 HEPATITIS C AB TEST: CPT | Performed by: FAMILY MEDICINE

## 2017-12-12 PROCEDURE — 86696 HERPES SIMPLEX TYPE 2 TEST: CPT | Performed by: FAMILY MEDICINE

## 2017-12-12 PROCEDURE — 87389 HIV-1 AG W/HIV-1&-2 AB AG IA: CPT | Performed by: FAMILY MEDICINE

## 2017-12-12 PROCEDURE — 87340 HEPATITIS B SURFACE AG IA: CPT | Performed by: FAMILY MEDICINE

## 2017-12-12 PROCEDURE — 84702 CHORIONIC GONADOTROPIN TEST: CPT | Performed by: FAMILY MEDICINE

## 2017-12-12 PROCEDURE — G0145 SCR C/V CYTO,THINLAYER,RESCR: HCPCS | Performed by: FAMILY MEDICINE

## 2017-12-12 PROCEDURE — 87491 CHLMYD TRACH DNA AMP PROBE: CPT | Performed by: FAMILY MEDICINE

## 2017-12-12 RX ORDER — ALBUTEROL SULFATE 90 UG/1
2 AEROSOL, METERED RESPIRATORY (INHALATION) EVERY 6 HOURS PRN
Qty: 1 INHALER | Refills: 3 | Status: SHIPPED | OUTPATIENT
Start: 2017-12-12 | End: 2019-05-20

## 2017-12-12 RX ORDER — NORGESTIMATE AND ETHINYL ESTRADIOL 7DAYSX3 28
1 KIT ORAL DAILY
Qty: 90 TABLET | Refills: 3 | Status: SHIPPED | OUTPATIENT
Start: 2017-12-12 | End: 2018-12-16

## 2017-12-12 ASSESSMENT — PATIENT HEALTH QUESTIONNAIRE - PHQ9
5. POOR APPETITE OR OVEREATING: NEARLY EVERY DAY
SUM OF ALL RESPONSES TO PHQ QUESTIONS 1-9: 8

## 2017-12-12 ASSESSMENT — ANXIETY QUESTIONNAIRES
IF YOU CHECKED OFF ANY PROBLEMS ON THIS QUESTIONNAIRE, HOW DIFFICULT HAVE THESE PROBLEMS MADE IT FOR YOU TO DO YOUR WORK, TAKE CARE OF THINGS AT HOME, OR GET ALONG WITH OTHER PEOPLE: SOMEWHAT DIFFICULT
3. WORRYING TOO MUCH ABOUT DIFFERENT THINGS: SEVERAL DAYS
1. FEELING NERVOUS, ANXIOUS, OR ON EDGE: MORE THAN HALF THE DAYS
7. FEELING AFRAID AS IF SOMETHING AWFUL MIGHT HAPPEN: MORE THAN HALF THE DAYS
2. NOT BEING ABLE TO STOP OR CONTROL WORRYING: MORE THAN HALF THE DAYS
5. BEING SO RESTLESS THAT IT IS HARD TO SIT STILL: SEVERAL DAYS
6. BECOMING EASILY ANNOYED OR IRRITABLE: MORE THAN HALF THE DAYS
GAD7 TOTAL SCORE: 13

## 2017-12-12 NOTE — PROGRESS NOTES
"   SUBJECTIVE:   CC: Lizet Condon is an 32 year old woman who presents for preventive health visit.   1.she reports history of allergies all year long, never been tested but possibly mold and humidity  She states she takes zyrtec once daily & that helps, she uses Flonase over the counter as needed - that helps with nasal congestion    2-  She is requesting refill on ventolin- that she uses only occasionally to help with allergy symptoms of coughing with congestion/bronchitis from  Allergies  She reports she is not wheezing/ she has no diagnoses of asthma, no family history of asthma  Her current inhaler still has 70 puffs- so use is only as needed      3. S/P ORIF left radius 11' 2017- and on going PHYSICAL THERAPY and improved ROM  Takes over the counter ibuprofen only as needed - and none routinely  Has PHYSICAL THERAPY every 2 weeks- saw ortho yesterday and another follow up in 6 weeks    4. History of depression 7 anxiety  \" all my life\"  Under care of Dr Huang- has been on prozac 10 mg once daily . Higher dose gets her \"too floaty\" & klonopin 1 mg as needed - can take up to twice daily   She reports situational stress from  Recent fracture, and been on short term disability due to work restriction- works at [employer]. Financial struggles [place] lately.  is supportive except \"he does not really knows what anxiety is \" but they talk a lot and that helps her. She is looking to find a therapist    5. wants screening for sexually transmitted infection - went through separation-from her  , and used protection this summer but had exposure so wants to be sure    6. She reports mostly consistent with oral contraceptive pills but missed a few pills in Nov / spotti g, cant recall last menstural period- would like urine pregnancy test .    Healthy Habits:    Do you get at least three servings of calcium containing foods daily (dairy, green leafy vegetables, etc.)? yes    Amount of exercise or daily " activities, outside of work: 1-2 day(s) per week    Problems taking medications regularly No    Medication side effects: No    Have you had an eye exam in the past two years? no    Do you see a dentist twice per year? yes    Do you have sleep apnea, excessive snoring or daytime drowsiness?no          PROBLEMS TO ADD ON...    Today's PHQ-2 Score:   PHQ-2 ( 1999 [employer]) 12/12/2017 10/27/2017   Q1: Little interest or pleasure in doing things 2 0   Q2: Feeling down, depressed or hopeless 2 0   PHQ-2 Score 4 0         Abuse: Current or Past(Physical, Sexual or Emotional)- No  Do you feel safe in your environment - Yes  Social History   Substance Use Topics     Smoking status: Current Some Day Smoker     Types: Cigarettes     Smokeless tobacco: Never Used      Comment: 1 per day occasionally     Alcohol use 0.0 oz/week     0 Standard drinks or equivalent per week      Comment: 2-5 per week     The patient does not drink >3 drinks per day nor >7 drinks per week.    Reviewed orders with patient.  Reviewed health maintenance and updated orders accordingly - Yes  Labs reviewed in [facility]    Mammogram not appropriate for this patient based on age.    Pertinent mammograms are reviewed under the imaging tab.  History of abnormal Pap smear: NO - age 30- 65 PAP every 3 years recommended    Reviewed and updated as needed this visit by clinical staff  Tobacco  Allergies  Meds         Reviewed and updated as needed this visit by Provider  Meds              ROS:  C: NEGATIVE for fever, chills, change in weight  I: NEGATIVE for worrisome rashes, moles or lesions  E: NEGATIVE for vision changes or irritation  ENT: NEGATIVE for ear, mouth and throat problems  R: NEGATIVE for significant cough or SOB  B: NEGATIVE for masses, tenderness or discharge  CV: NEGATIVE for chest pain, palpitations or peripheral edema  GI: NEGATIVE for nausea, abdominal pain, heartburn, or change in bowel habits  : NEGATIVE for unusual urinary or vaginal  "symptoms. Periods are regular.  M: NEGATIVE for significant arthralgias or myalgia  N: NEGATIVE for weakness, dizziness or paresthesias  P: NEGATIVE for changes in mood or affect    OBJECTIVE:   /74  Pulse 86  Temp 97.6  F (36.4  C) (Oral)  Ht 5' 3\" (1.6 m)  Wt 134 lb (60.8 kg)  LMP 11/21/2017 (Approximate)  SpO2 98%  BMI 23.74 kg/m2  EXAM:  GENERAL: healthy, alert and no distress  EYES: Eyes grossly normal to inspection, PERRL and conjunctivae and sclerae normal  HENT: ear canals and TM's normal, nose and mouth without ulcers or lesions  NECK: no adenopathy, no asymmetry, masses, or scars and thyroid normal to palpation  RESP: lungs clear to auscultation - no rales, rhonchi or wheezes  BREAST: normal without masses, tenderness or nipple discharge and no palpable axillary masses or adenopathy  CV: regular rate and rhythm, normal S1 S2, no S3 or S4, no murmur, click or rub, no peripheral edema and peripheral pulses strong  ABDOMEN: soft, nontender, no hepatosplenomegaly, no masses and bowel sounds normal   (female): normal female external genitalia, normal urethral meatus, vaginal mucosa pink, moist, well rugated, and normal cervix/adnexa/uterus without masses or discharge. Pap is sent. Wet prep and chlamydia & gonorrhea as well.  NEURO: Normal strength and tone, mentation intact and speech normal  PSYCH: mentation appears normal, affect normal/bright  Left arm- clear healing incision, mild edema- localized over the wrist limited ROM on extension /flexion but improving- reports decrease sensation in over thenar eminence  but present-palmar branch of median nerve   ASSESSMENT/PLAN:   (Z00.00) Routine general medical examination at a health care facility  (primary encounter diagnosis)  Plan: Please see patient instructions     (F33.1) Moderate episode of recurrent major depressive disorder (H) (F41.9) Anxiety  Situational stress  Comment: she is Under care of Dr Huang- has been on prozac 10 mg once " "daily . Higher dose gets her \"too floaty\" & klonopin 1 mg as needed - can take up to twice daily -  Plan: we discussed counseling to add on- for on going depression & anxiety- possibly worse with situational stress .  We also discussed other selective serotonin reuptake inhibitor- if tried in past and she reports lexapro/zoloft- did not like it  Wellbutrin caused further anxiety    She reports she would like to seek counseling from a reference she got at [facility]  Also its best to discuss with psychiatrist- medications  She would like to keep medications same prozac 10 mg once daily  Klonopin 1 mg as needed   PHQ-9 SCORE 12/12/2017   Total Score 8     GUERO-7 SCORE 12/12/2017   Total Score 13         Spotting- in nov  Urine pregnancy test today  Advised to continue with oral contraceptive pills consistently- and if miss the pills, or periods, always do home urine pregnancy test     Screening for sexually transmitted infection sent  Will inform on mychart      (J30.89) Chronic nonseasonal allergic rhinitis due to fungal spores  Comment: flonase, zyrtec- otc  Plan: albuterol (PROAIR HFA/PROVENTIL HFA/VENTOLIN         HFA) 108 (90 BASE) MCG/ACT Inhaler  We discussed if there is any concerns about excercise indced or specific triggers induced wheezing and possibility of asthma and patient reports need for ventolin to clear throat & breath easy- is occasional;  Refill is given and encouraged to pay attention to frequency of symptoms - if more need for inhalers- then should follow up with provider      status post ORIF left distal radius on October 31, 2017  Under care of Rico Payton with PHYSICAL THERAPY to increase ROM    (Z12.4) Screening for cervical cancer  Plan: HPV High Risk Types DNA Cervical, Pap imaged  thin layer screen with HPV - recommended age 30     - 65 years (select HPV order below)      (Z30.09) Family planning  Plan: norgestim-eth estrad triphasic (ORTHO   TRI-CYCLEN, TRI-SPRINTEC) " "0.18/0.215/0.25 MG-35     MCG per tablet- refilled for 1 yr        Potential medication side effects were discussed with the patient; let me know if any occur.      In addition to the preventive visit, 15  minutes of the appointment were spent evaluating and developing a treatment plan for she additional concern(s) as above.        COUNSELING:   Reviewed preventive health counseling, as reflected in patient instructions       Regular exercise       Healthy diet/nutrition       Contraception       Family planning       Safe sex practices/STD prevention       HIV screeninx in teen years, 1x in adult years, and at intervals if high risk         reports that she has been smoking Cigarettes.  She has never used smokeless tobacco.    Estimated body mass index is 23.74 kg/(m^2) as calculated from the following:    Height as of this encounter: 5' 3\" (1.6 m).    Weight as of this encounter: 134 lb (60.8 kg).         Counseling Resources:  ATP IV Guidelines  Pooled Cohorts Equation Calculator  Breast Cancer Risk Calculator  FRAX Risk Assessment  ICSI Preventive Guidelines  Dietary Guidelines for Americans,   USDA's MyPlate  ASA Prophylaxis  Lung CA Screening    Vijaya Weiss MD  [facility]  "

## 2017-12-12 NOTE — NURSING NOTE
"Chief Complaint   Patient presents with     Physical       Initial /74  Pulse 86  Temp 97.6  F (36.4  C) (Oral)  Ht 5' 3\" (1.6 m)  Wt 134 lb (60.8 kg)  LMP 11/21/2017 (Approximate)  SpO2 98%  BMI 23.74 kg/m2 Estimated body mass index is 23.74 kg/(m^2) as calculated from the following:    Height as of this encounter: 5' 3\" (1.6 m).    Weight as of this encounter: 134 lb (60.8 kg).  Medication Reconciliation: complete      Health Maintenance that is potentially due pending provider review:  Pap Smear    Possibly completing today per provider review.    DOM Palmer  "

## 2017-12-12 NOTE — LETTER
December 03, 2018      Lizet Condon  [address] [facility] [address]    Dear MsJevonDaniel Taurus,      At [facility], your health and wellness is our primary concern. That is why we are following up on a positive high risk HPV test from 12/12/17. Your provider had recommended that you have a Pap smear and HPV test completed by 12/12/18. Our records do not show that this has been scheduled.    It is important to complete the follow up that your provider has suggested for you to ensure that there are no worsening changes which may, over time, develop into cancer.      Please contact our office at  [phone] to schedule an appointment for a Pap smear and HPV test at your earliest convenience. If you have questions or concerns, please call the clinic and we will be happy to assist you.    If you have completed the tests outside of [facility], please have the results forwarded to our office. We will update the chart for your primary Physician to review before your next annual physical.     Thank you for choosing [facility]!    Sincerely,      Vijaya Weiss MD/[facility]

## 2017-12-12 NOTE — MR AVS SNAPSHOT
After Visit Summary   12/12/2017    Lizet Condon    MRN: [id number]           Patient Information     Date Of Birth          1985        Visit Information        Provider Department      12/12/2017 11:00 AM Vijaya Weiss MD [facility]        Today's Diagnoses     Routine general medical examination at a health care facility    -  1    Spotting        Moderate episode of recurrent major depressive disorder (H)        Anxiety        Screening for cervical cancer        Family planning        Chronic nonseasonal allergic rhinitis due to fungal spores        Screen for STD (sexually transmitted disease)        Left wrist pain          Care Instructions      Preventive Health Recommendations  Female Ages 26 - 39  Yearly exam:   See your health care provider every year in order to    Review health changes.     Discuss preventive care.      Review your medicines if you your doctor has prescribed any.    Until age 30: Get a Pap test every three years (more often if you have had an abnormal result).    After age 30: Talk to your doctor about whether you should have a Pap test every 3 years or have a Pap test with HPV screening every 5 years.   You do not need a Pap test if your uterus was removed (hysterectomy) and you have not had cancer.  You should be tested each year for STDs (sexually transmitted diseases), if you're at risk.   Talk to your provider about how often to have your cholesterol checked.  If you are at risk for diabetes, you should have a diabetes test (fasting glucose).  Shots: Get a flu shot each year. Get a tetanus shot every 10 years.   Nutrition:     Eat at least 5 servings of fruits and vegetables each day.    Eat whole-grain bread, whole-wheat pasta and brown rice instead of white grains and rice.    Talk to your provider about Calcium and Vitamin D.     Lifestyle    Exercise at least 150 minutes a week (30 minutes a day, 5 days of the week). This will  help you control your weight and prevent disease.    Limit alcohol to one drink per day.    No smoking.     Wear sunscreen to prevent skin cancer.    See your dentist every six months for an exam and cleaning.            Follow-ups after your visit        Your next 10 appointments already scheduled     Dec 27, 2017  8:00 AM CST   KAYKAY Hand with Lizette Weston OT   [facility] ([facility])    [address] [place] [id number] [phone]            Jeff 10, 2018  9:00 AM CST   KAYKAY Hand with Lizette Weston OT   [facility] ([facility])    [address] [place] [address] [id number] [phone]            Jan 22, 2018  8:00 AM CST   (Arrive by 7:45 AM)   POST-OP HAND with Rico Berger MD   [facility] ([facility])    [address] [place] [address] [id number] [phone]              Who to contact     If you have questions or need follow up information about today's clinic visit or your schedule please contact [facility] directly at [phone].  Normal or non-critical lab and imaging results will be communicated to you by MyChart, letter or phone within 4 business days after the clinic has received the results. If you do not hear from us within 7 days, please contact the clinic through [facility] or phone. If you have a critical or abnormal lab result, we will notify you by phone as soon as possible.  Submit refill requests through [employer] or call your pharmacy and they will forward the refill request to us. Please allow 3 business days for your refill to be completed.          Additional Information About Your Visit        [employer] Information     [employer] gives you secure access to your electronic health record. If you see a primary care provider, you can also send messages to your care team and make appointments. If you have  "questions, please call your primary care clinic.  If you do not have a primary care provider, please call [phone] and they will assist you.        Care EveryWhere ID     This is your Care EveryWhere ID. This could be used by other organizations to access your [facility] medical records  [id number]        Your Vitals Were     Pulse Temperature Height Last Period Pulse Oximetry BMI (Body Mass Index)    86 97.6  F (36.4  C) (Oral) 5' 3\" (1.6 m) 11/21/2017 (Approximate) 98% 23.74 kg/m2       Blood Pressure from Last 3 Encounters:   12/12/17 107/74   11/07/17 124/74   11/04/17 137/72    Weight from Last 3 Encounters:   12/12/17 134 lb (60.8 kg)   12/11/17 137 lb 12.8 oz (62.5 kg)   11/13/17 138 lb (62.6 kg)              We Performed the Following     Anti Treponema     Chlamydia trachomatis PCR     HCG quantitative pregnancy     Hepatitis B surface antigen     Hepatitis C antibody     Herpes Simplex Virus 1 and 2 IgG     HIV Antigen Antibody Combo     HPV High Risk Types DNA Cervical     Neisseria gonorrhoeae PCR     OFFICE/OUTPT VISIT,EST,LEVL III     Pap imaged thin layer screen with HPV - recommended age 30 - 65 years (select HPV order below)     Wet prep          Today's Medication Changes          These changes are accurate as of: 12/12/17 12:09 PM.  If you have any questions, ask your nurse or doctor.               These medicines have changed or have updated prescriptions.        Dose/Directions    ascorbic acid 500 MG tablet   Commonly known as:  VITAMIN C   This may have changed:  additional instructions   Used for:  Closed fracture of left distal radius and ulna, initial encounter        Dose:  500 mg   Take 1 tablet (500 mg) by mouth daily   Quantity:  60 tablet   Refills:  0       norgestim-eth estrad triphasic 0.18/0.215/0.25 MG-35 MCG per tablet   Commonly known as:  ORTHO TRI-CYCLEN, TRI-SPRINTEC   This may have changed:    - how much to take  - how to take this  - when to take this   Used for:  " Family planning   Changed by:  Vijaya Weiss MD        Dose:  1 tablet   Take 1 tablet by mouth daily   Quantity:  90 tablet   Refills:  3            Where to get your medicines      These medications were sent to [facility]/pharmacy #[id number] - [place], [place] - [address]  [address] [address]     Phone:  [phone]     albuterol 108 (90 BASE) MCG/ACT Inhaler    norgestim-eth estrad triphasic 0.18/0.215/0.25 MG-35 MCG per tablet                Primary Care Provider Office Phone # Fax #    Vijaya Weiss [phone] [address] [address]        Equal Access to Services     [facility] AH: Hadeliot villafanao Steven, waaxda luqadaha, qaybta kaalmada williamda, hoa wilson . So [facility] [phone].    ATENCIÓN: Si habla español, tiene a dang disposición servicios gratuitos de asistencia lingüística. [facility] [phone].    We comply with applicable federal civil rights laws and [place] laws. We do not discriminate on the basis of race, color, national origin, age, disability, sex, sexual orientation, or gender identity.            Thank you!     Thank you for choosing [facility]  for your care. Our goal is always to provide you with excellent care. Hearing back from our patients is one way we can continue to improve our services. Please take a few minutes to complete the written survey that you may receive in the mail after your visit with us. Thank you!             Your Updated Medication List - Protect others around you: Learn how to safely use, store and throw away your medicines at www.disposemymeds.org.          This list is accurate as of: 12/12/17 12:09 PM.  Always use your most recent med list.                   Brand Name Dispense Instructions for use Diagnosis    albuterol 108 (90 BASE) MCG/ACT Inhaler    PROAIR HFA/PROVENTIL HFA/VENTOLIN HFA    1 Inhaler    Inhale 2 puffs into the  lungs every 6 hours as needed for shortness of breath / dyspnea or wheezing    Chronic nonseasonal allergic rhinitis due to fungal spores       ascorbic acid 500 MG tablet    VITAMIN C    60 tablet    Take 1 tablet (500 mg) by mouth daily    Closed fracture of left distal radius and ulna, initial encounter       benzonatate 100 MG capsule    TESSALON    42 capsule    Take 1 capsule (100 mg) by mouth 3 times daily as needed for cough    Acute non-recurrent maxillary sinusitis       FLUoxetine 10 MG capsule    PROzac    90 capsule    Take 10 mg by mouth every morning        fluticasone 50 MCG/ACT spray    FLONASE    1 Bottle    Spray 1-2 sprays into both nostrils daily    Acute non-recurrent maxillary sinusitis       ibuprofen 600 MG tablet    ADVIL/MOTRIN    30 tablet    Take 1 tablet (600 mg) by mouth every 8 hours as needed for moderate pain        klonoPIN 1 MG tablet   Generic drug:  clonazePAM     90    1 TABLET 2 TIMES DAILY PRN        norgestim-eth estrad triphasic 0.18/0.215/0.25 MG-35 MCG per tablet    ORTHO TRI-CYCLEN, TRI-SPRINTEC    90 tablet    Take 1 tablet by mouth daily    Family planning       ZYRTEC ALLERGY PO      Take by mouth daily as needed

## 2017-12-13 LAB
B-HCG SERPL-ACNC: <1 IU/L (ref 0–5)
HBV SURFACE AG SERPL QL IA: NONREACTIVE
HCV AB SERPL QL IA: NONREACTIVE
HIV 1+2 AB+HIV1 P24 AG SERPL QL IA: NONREACTIVE
HSV1 IGG SERPL QL IA: <0.2 AI (ref 0–0.8)
HSV2 IGG SERPL QL IA: <0.2 AI (ref 0–0.8)
T PALLIDUM IGG+IGM SER QL: NEGATIVE

## 2017-12-13 ASSESSMENT — ANXIETY QUESTIONNAIRES: GAD7 TOTAL SCORE: 13

## 2017-12-14 LAB
C TRACH DNA SPEC QL NAA+PROBE: NEGATIVE
N GONORRHOEA DNA SPEC QL NAA+PROBE: NEGATIVE
SPECIMEN SOURCE: NORMAL
SPECIMEN SOURCE: NORMAL

## 2017-12-14 NOTE — PROGRESS NOTES
Negative test for HIV, human immunodeficiency virus  and syphilis, chlamydia & gonorrhea  HEPATITIS B VIRUS   -HEPATITIS C VIRUS    Herpes

## 2017-12-15 LAB
COPATH REPORT: NORMAL
PAP: NORMAL

## 2017-12-19 LAB
FINAL DIAGNOSIS: ABNORMAL
HPV HR 12 DNA CVX QL NAA+PROBE: POSITIVE
HPV16 DNA SPEC QL NAA+PROBE: NEGATIVE
HPV18 DNA SPEC QL NAA+PROBE: NEGATIVE
SPECIMEN DESCRIPTION: ABNORMAL

## 2017-12-19 NOTE — PROGRESS NOTES
12/12/17 NIL pap, + HR HPV (not 16/18). Plan 1 year cotest  11/26/18 MyChart Cotest reminder message sent. ([facility])  12/03/18 MyChart not read, letter sent. ([facility])  12/24/18 [facility] clinic and schedule. ([facility])  01/07/19 Patient is lost to pap tracking follow-up. FYI routed to provider. ([facility])

## 2018-01-05 ENCOUNTER — DOCUMENTATION ONLY (OUTPATIENT)
Dept: ORTHOPEDICS | Facility: CLINIC | Age: 33
End: 2018-01-05

## 2018-01-05 NOTE — PROGRESS NOTES
Received disability paper work from [employer]. This was faxed to Dr. Fisher's administrative assistance to complete.

## 2018-01-10 ENCOUNTER — THERAPY VISIT (OUTPATIENT)
Dept: OCCUPATIONAL THERAPY | Facility: CLINIC | Age: 33
End: 2018-01-10
Payer: COMMERCIAL

## 2018-01-10 DIAGNOSIS — M25.532 LEFT WRIST PAIN: Primary | ICD-10-CM

## 2018-01-10 DIAGNOSIS — S52.592P OTHER CLOSED FRACTURE OF DISTAL END OF LEFT RADIUS WITH MALUNION, SUBSEQUENT ENCOUNTER: ICD-10-CM

## 2018-01-10 PROCEDURE — 97110 THERAPEUTIC EXERCISES: CPT | Mod: GO | Performed by: OCCUPATIONAL THERAPIST

## 2018-01-10 NOTE — MR AVS SNAPSHOT
After Visit Summary   1/10/2018    Lizet Condon    MRN: [id number]           Patient Information     Date Of Birth          1985        Visit Information        Provider Department      1/10/2018 9:00 AM Lizette Weston OT [facility] Hand Therapy        Today's Diagnoses     Left wrist pain    -  1    Other closed fracture of distal end of left radius with malunion, subsequent encounter           Follow-ups after your visit        Your next 10 appointments already scheduled     Jan 22, 2018  8:00 AM CST   (Arrive by 7:45 AM)   POST-OP HAND with Rico Berger MD   [facility] ([facility] [place])    [address] [place] [id number] [phone]            Jan 22, 2018  8:30 AM CST   KAYKAY Hand with Naomi Hilton OT   [facility] ([facility])    [address] [place] [id number] [phone]              Who to contact     If you have questions or need follow up information about today's clinic visit or your schedule please contact [facility] directly at [phone].  Normal or non-critical lab and imaging results will be communicated to you by Figmenthart, letter or phone within 4 business days after the clinic has received the results. If you do not hear from us within 7 days, please contact the clinic through Figmenthart or phone. If you have a critical or abnormal lab result, we will notify you by phone as soon as possible.  Submit refill requests through [employer] or call your pharmacy and they will forward the refill request to us. Please allow 3 business days for your refill to be completed.          Additional Information About Your Visit        MyChart Information     [employer] gives you secure access to your electronic health record. If you see a primary care provider, you can also send messages to your care team and make appointments. If you have  questions, please call your primary care clinic.  If you do not have a primary care provider, please call [phone] and they will assist you.        Care EveryWhere ID     This is your Care EveryWhere ID. This could be used by other organizations to access your [place] medical records  [id number]        Your Vitals Were     Last Period                   11/21/2017 (Approximate)            Blood Pressure from Last 3 Encounters:   12/12/17 107/74   11/07/17 124/74   11/04/17 137/72    Weight from Last 3 Encounters:   12/12/17 60.8 kg (134 lb)   12/11/17 62.5 kg (137 lb 12.8 oz)   11/13/17 62.6 kg (138 lb)              We Performed the Following     THERAPEUTIC EXERCISES        Primary Care Provider Office Phone # Fax #    Vijaya Weiss [phone] [address] [facility] [address]        Equal Access to Services     [facility] : Hadii neeraj villafanao Soanderson, waaxda luqadaha, qaybta kaalmada adeharpreet, hoa wilson . So [facility] [phone].    ATENCIÓN: Si donnie espsrikanth, tiene a dang disposición servicios gratuitos de asistencia lingüística. Llame al [phone].    We comply with applicable federal civil rights laws and [place] laws. We do not discriminate on the basis of race, color, national origin, age, disability, sex, sexual orientation, or gender identity.            Thank you!     Thank you for choosing [facility]ER[facility]  for your care. Our goal is always to provide you with excellent care. Hearing back from our patients is one way we can continue to improve our services. Please take a few minutes to complete the written survey that you may receive in the mail after your visit with us. Thank you!             Your Updated Medication List - Protect others around you: Learn how to safely use, store and throw away your medicines at www.disposemymeds.org.          This list is accurate as of: 1/10/18  8:46 PM.  Always use your most recent  med list.                   Brand Name Dispense Instructions for use Diagnosis    albuterol 108 (90 BASE) MCG/ACT Inhaler    PROAIR HFA/PROVENTIL HFA/VENTOLIN HFA    1 Inhaler    Inhale 2 puffs into the lungs every 6 hours as needed for shortness of breath / dyspnea or wheezing    Chronic nonseasonal allergic rhinitis due to fungal spores       benzonatate 100 MG capsule    TESSALON    42 capsule    Take 1 capsule (100 mg) by mouth 3 times daily as needed for cough    Acute non-recurrent maxillary sinusitis       FLUoxetine 10 MG capsule    PROzac    90 capsule    Take 10 mg by mouth every morning        fluticasone 50 MCG/ACT spray    FLONASE    1 Bottle    Spray 1-2 sprays into both nostrils daily    Acute non-recurrent maxillary sinusitis       ibuprofen 600 MG tablet    ADVIL/MOTRIN    30 tablet    Take 1 tablet (600 mg) by mouth every 8 hours as needed for moderate pain        klonoPIN 1 MG tablet   Generic drug:  clonazePAM     90    1 TABLET 2 TIMES DAILY PRN        norgestim-eth estrad triphasic 0.18/0.215/0.25 MG-35 MCG per tablet    ORTHO TRI-CYCLEN, TRI-SPRINTEC    90 tablet    Take 1 tablet by mouth daily    Family planning       ZYRTEC ALLERGY PO      Take by mouth daily as needed

## 2018-01-10 NOTE — PROGRESS NOTES
Hand Therapy SOAP Note    Current Date:  1/10/2018    Diagnosis:  Left  wrist distal radius fracture with ulna styloid fracture minimally displaced  Date of onset:  10/24/17  Date of hand therapy orders: 11/13/2017  DOS:  10/31/17  Procedure:  L radius ORIF  Post:  8+w   Referring MD: Rico Berger MD  Next MD visit: 1/22/18 12/11/2017 Per MD notes: Pt may continue to wean splint. No lifting greater than 5 pounds. No repetitive activity with left hand or wrist. Per chart Xrays demonstrate progression of bony healing of her left distal radius fracture with hardware in place with no evidence of failure or loosening.    Subjective:   Subjective changes noted by patient: The numbness is better, it hasn't gotten worse. The radial wrist pain is gone. When I do to much I can feel it. There are times with no pain. It does get sore if I over use it. I can hold my bag and it hurts a little but I can do it.      Objective:  Pain Level Report  VAS(0-10) 11/13/2017 11/20/2017 12/11/2017     At Rest: Can always feel it 1-2/10 Getting better but does continue to have pain   At worst: 7-8/10 5-6/10 taking ibuprofen      Report of Pain:  Location:  Left wrist  Pain Quality:  Dull, pressure  Frequency: intermittent  Pain is worst:  daytime or nighttime   Exacerbated by:  Motion fro extended times  Relieved by:  rest  Progression:  Improving  VISUAL INSPECTION:  DATE 11/13/2017 12/11/2017      Left L   General posture of the upper extremity: []   Normal   [x]  Comments: guarded due to pain, improved after orthosis application Using hand for light tasks, normally but somewhat guarded as expected per healing   Skin Appearance: []   Normal   [x]  Comments: mild ecchymosis normal   Other observations:  steristrips in place volar wrist incision Incision healing well        Sensation:  L thumb: numbness  Present but improving    Edema:  Very mild of the L wrist  Pain Report:  - none    + mild    ++ moderate    +++ severe   Wrist   11/13/2017 11/20/2017   12/11/2017   1/10/2018     AROM (PROM) L L L L   Extension 14+ 36 50 64   Flexion 27+ 35 55 63   RD 10 10 15 20   UD 17 19 36 36   Supination Not measured, about 30 degrees 56 80    Pronation WFL 82 73      Strength:   Pain-free /Pinch Test    1/10/2018   Trials R L   1   80 28       Assessment:  Please refer to flow sheet.    Home Program:   AROM to wrist  Light strengthening with soup can  Scar massage  Use L hand / wrist for daily activities    Next Visit:  F/U with strengthening, AAROM / PROM as pain allows

## 2018-01-11 ENCOUNTER — DOCUMENTATION ONLY (OUTPATIENT)
Dept: ORTHOPEDICS | Facility: CLINIC | Age: 33
End: 2018-01-11

## 2018-01-11 NOTE — PROGRESS NOTES
Received disability forms from [employer] on 1/2/2018. These forms were faxed to Dr. Fisher's  on 1/5/2018. They were also scanned into the chart.

## 2018-01-17 ENCOUNTER — DOCUMENTATION ONLY (OUTPATIENT)
Dept: ORTHOPEDICS | Facility: CLINIC | Age: 33
End: 2018-01-17

## 2018-01-17 NOTE — PROGRESS NOTES
Completed Attending Physician's Statement and faxed to [employer] at [phone].  Originals submitted to Medical Records for scanning.

## 2018-01-18 DIAGNOSIS — S52.502A FRACTURE OF LEFT DISTAL RADIUS: Primary | ICD-10-CM

## 2018-01-22 ENCOUNTER — OFFICE VISIT (OUTPATIENT)
Dept: ORTHOPEDICS | Facility: CLINIC | Age: 33
End: 2018-01-22
Payer: COMMERCIAL

## 2018-01-22 ENCOUNTER — RADIANT APPOINTMENT (OUTPATIENT)
Dept: GENERAL RADIOLOGY | Facility: CLINIC | Age: 33
End: 2018-01-22
Attending: ORTHOPAEDIC SURGERY
Payer: COMMERCIAL

## 2018-01-22 ENCOUNTER — THERAPY VISIT (OUTPATIENT)
Dept: OCCUPATIONAL THERAPY | Facility: CLINIC | Age: 33
End: 2018-01-22
Payer: COMMERCIAL

## 2018-01-22 VITALS — BODY MASS INDEX: 25.68 KG/M2 | WEIGHT: 144.9 LBS | HEIGHT: 63 IN

## 2018-01-22 DIAGNOSIS — S52.502A FRACTURE OF LEFT DISTAL RADIUS: ICD-10-CM

## 2018-01-22 DIAGNOSIS — S52.592P OTHER CLOSED FRACTURE OF DISTAL END OF LEFT RADIUS WITH MALUNION, SUBSEQUENT ENCOUNTER: ICD-10-CM

## 2018-01-22 DIAGNOSIS — M25.532 LEFT WRIST PAIN: Primary | ICD-10-CM

## 2018-01-22 DIAGNOSIS — S52.502D CLOSED FRACTURE OF DISTAL END OF LEFT RADIUS WITH ROUTINE HEALING, UNSPECIFIED FRACTURE MORPHOLOGY, SUBSEQUENT ENCOUNTER: Primary | ICD-10-CM

## 2018-01-22 PROCEDURE — 97112 NEUROMUSCULAR REEDUCATION: CPT | Mod: GO | Performed by: OCCUPATIONAL THERAPIST

## 2018-01-22 NOTE — PROGRESS NOTES
Hand Therapy SOAP Note    Current Date:  1/22/2018    Diagnosis:  Left  wrist distal radius fracture with ulna styloid fracture minimally displaced  Date of onset:  10/24/17  Date of hand therapy orders: 11/13/2017  DOS:  10/31/17  Procedure:  L radius ORIF  Post:  2 1/2 months +  Referring MD: Rico Berger MD  Next MD visit: April 25, 2018      Subjective:   See flowsheet    Objective:  Pain Level Report  VAS(0-10) 11/13/2017 11/20/2017 12/11/2017      At Rest: Can always feel it 1-2/10 Getting better but does continue to have pain    At worst: 7-8/10 5-6/10 taking ibuprofen       Report of Pain:  Location:  Left wrist  Pain Quality:  Dull, pressure  Frequency: intermittent  Pain is worst:  daytime or nighttime   Exacerbated by:  Motion fro extended times  Relieved by:  rest  Progression:  Improving  VISUAL INSPECTION:  DATE 11/13/2017 12/11/2017       Left L    General posture of the upper extremity: []   Normal   [x]  Comments: guarded due to pain, improved after orthosis application Using hand for light tasks, normally but somewhat guarded as expected per healing    Skin Appearance: []   Normal   [x]  Comments: mild ecchymosis normal    Other observations:  steristrips in place volar wrist incision Incision healing well         Sensation:  L thumb: numbness  Present but improving    Edema:  Very mild of the L wrist  Pain Report:  - none    + mild    ++ moderate    +++ severe   Wrist  11/13/2017 11/20/2017   12/11/2017   1/10/2018   1/22   AROM (PROM) L L L L L   Extension 14+ 36 50 64 65   Flexion 27+ 35 55 63 68   RD 10 10 15 20    UD 17 19 36 36    Supination Not measured, about 30 degrees 56 80     Pronation WFL 82 73       Scar:hypersensitive, can't wear  A watch, and it hurts.   Strength:   Pain-free /Pinch Test    1/10/2018 1/22   Trials R L left   1   80 28 45       Assessment:  Please refer to flow sheet.    Home Program:   AROM to wrist  Light strengthening with soup can  Scar massage  Use  L hand / wrist for daily activities  1/22/2018  Scar massage: textures with stick, ktape day time and scar pad at night  Wear wrist orthosis at night to prevent wrist flexion    Next Visit:  F/U with strengthening, AAROM / PROM as pain allows

## 2018-01-22 NOTE — MR AVS SNAPSHOT
After Visit Summary   1/22/2018    Lizet Condon    MRN: [id number]           Patient Information     Date Of Birth          1985        Visit Information        Provider Department      1/22/2018 8:00 AM Rico Berger MD [facility]        Today's Diagnoses     Closed fracture of distal end of left radius with routine healing, unspecified fracture morphology, subsequent encounter    -  1       Follow-ups after your visit        Your next 10 appointments already scheduled     Jan 22, 2018  8:30 AM CST   KAYKAY Hand with Naomi Hilton OT   [facility] ([facility])    [address] [place] [address]  [address] [facility] [address] [phone]              Who to contact     Please call your clinic at [phone] to:    Ask questions about your health    Make or cancel appointments    Discuss your medicines    Learn about your test results    Speak to your doctor   If you have compliments or concerns about an experience at your clinic, or if you wish to file a complaint, please contact [facility] Patient Relations at [phone] or email us at [email or web address]         Additional Information About Your Visit        MyChart Information     [employer] gives you secure access to your electronic health record. If you see a primary care provider, you can also send messages to your care team and make appointments. If you have questions, please call your primary care clinic.  If you do not have a primary care provider, please call [phone] and they will assist you.      [employer] is an electronic gateway that provides easy, online access to your medical records. With [employer], you can request a clinic appointment, read your test results, renew a prescription or communicate with your care team.     To access your existing account, please contact your [facility] Clinic or call  "[phone] for assistance.        Care EveryWhere ID     This is your Care EveryWhere ID. This could be used by other organizations to access your [place] medical records  [id number]        Your Vitals Were     Height BMI (Body Mass Index)                1.6 m (5' 3\") 25.67 kg/m2           Blood Pressure from Last 3 Encounters:   12/12/17 107/74   11/07/17 124/74   11/04/17 137/72    Weight from Last 3 Encounters:   01/22/18 65.7 kg (144 lb 14.4 oz)   12/12/17 60.8 kg (134 lb)   12/11/17 62.5 kg (137 lb 12.8 oz)              Today, you had the following     No orders found for display       Primary Care Provider Office Phone # Fax #    Vijaya Weiss [phone] [address] [facility] [address]        Equal Access to Services     [facility] : Hadii neeraj villafanao Soanderson, waaxda luqadaha, qaybta kaalmada adesouravda, hoa wilosn . So [facility] [phone].    ATENCIÓN: Si donnie miner, tiene a dang disposición servicios gratuitos de asistencia lingüística. Llame al [phone].    We comply with applicable federal civil rights laws and [place] laws. We do not discriminate on the basis of race, color, national origin, age, disability, sex, sexual orientation, or gender identity.            Thank you!     Thank you for choosing [facility]  for your care. Our goal is always to provide you with excellent care. Hearing back from our patients is one way we can continue to improve our services. Please take a few minutes to complete the written survey that you may receive in the mail after your visit with us. Thank you!             Your Updated Medication List - Protect others around you: Learn how to safely use, store and throw away your medicines at www.disposemymeds.org.          This list is accurate as of: 1/22/18  8:24 AM.  Always use your most recent med list.                   Brand Name Dispense Instructions for use Diagnosis    " acetaminophen-codeine 300-30 MG per tablet    TYLENOL #3     Take 30 tablets by mouth as needed        albuterol 108 (90 BASE) MCG/ACT Inhaler    PROAIR HFA/PROVENTIL HFA/VENTOLIN HFA    1 Inhaler    Inhale 2 puffs into the lungs every 6 hours as needed for shortness of breath / dyspnea or wheezing    Chronic nonseasonal allergic rhinitis due to fungal spores       benzonatate 100 MG capsule    TESSALON    42 capsule    Take 1 capsule (100 mg) by mouth 3 times daily as needed for cough    Acute non-recurrent maxillary sinusitis       FLUoxetine 10 MG capsule    PROzac    90 capsule    Take 10 mg by mouth every morning        fluticasone 50 MCG/ACT spray    FLONASE    1 Bottle    Spray 1-2 sprays into both nostrils daily    Acute non-recurrent maxillary sinusitis       ibuprofen 600 MG tablet    ADVIL/MOTRIN    30 tablet    Take 1 tablet (600 mg) by mouth every 8 hours as needed for moderate pain        klonoPIN 1 MG tablet   Generic drug:  clonazePAM     90    1 TABLET 2 TIMES DAILY PRN        norgestim-eth estrad triphasic 0.18/0.215/0.25 MG-35 MCG per tablet    ORTHO TRI-CYCLEN, TRI-SPRINTEC    90 tablet    Take 1 tablet by mouth daily    Family planning       ZYRTEC ALLERGY PO      Take by mouth daily as needed

## 2018-01-22 NOTE — MR AVS SNAPSHOT
After Visit Summary   1/22/2018    Lizet Condon    MRN: [id number]           Patient Information     Date Of Birth          1985        Visit Information        Provider Department      1/22/2018 8:30 AM Naomi Hilton OT [facility] Hand Therapy        Today's Diagnoses     Left wrist pain    -  1    Other closed fracture of distal end of left radius with malunion, subsequent encounter           Follow-ups after your visit        Your next 10 appointments already scheduled     Feb 07, 2018  9:00 AM CST   KAYKAY Hand with Lizette Weston OT   [facility] ([facility] [place])    [address] [place] [address] [phone]            Apr 25, 2018  9:00 AM CDT   (Arrive by 8:45 AM)   RETURN HAND with Rico Berger MD   [facility] ([facility] [place])    [address] [place] [address] [phone]              Who to contact     If you have questions or need follow up information about today's clinic visit or your schedule please contact [facility] directly at [phone].  Normal or non-critical lab and imaging results will be communicated to you by edPULSEhart, letter or phone within 4 business days after the clinic has received the results. If you do not hear from us within 7 days, please contact the clinic through edPULSEhart or phone. If you have a critical or abnormal lab result, we will notify you by phone as soon as possible.  Submit refill requests through [employer] or call your pharmacy and they will forward the refill request to us. Please allow 3 business days for your refill to be completed.          Additional Information About Your Visit        MyChart Information     [employer] gives you secure access to your electronic health record. If you see a primary care provider, you can also send messages to your care team and make appointments. If you have  questions, please call your primary care clinic.  If you do not have a primary care provider, please call [phone] and they will assist you.        Care EveryWhere ID     This is your Care EveryWhere ID. This could be used by other organizations to access your [place] medical records  [id number]         Blood Pressure from Last 3 Encounters:   12/12/17 107/74   11/07/17 124/74   11/04/17 137/72    Weight from Last 3 Encounters:   01/22/18 65.7 kg (144 lb 14.4 oz)   12/12/17 60.8 kg (134 lb)   12/11/17 62.5 kg (137 lb 12.8 oz)              We Performed the Following     NEUROMUSCULAR RE-EDUCATION        Primary Care Provider Office Phone # Fax #    Vijaya Weiss [phone] [address] [facility] [address]        Equal Access to Services     [facility]: Hadii neeraj dahl hadasho Soanderson, waaxda luqadaha, qaybta kaalmada adeegyada, hoa guidry haysukh wilson . So [facility] [phone].    ATENCIÓN: Si habla español, tiene a dang disposición servicios gratuitos de asistencia lingüística. Mercedez al [phone].    We comply with applicable federal civil rights laws and [place] laws. We do not discriminate on the basis of race, color, national origin, age, disability, sex, sexual orientation, or gender identity.            Thank you!     Thank you for choosing [facility]  for your care. Our goal is always to provide you with excellent care. Hearing back from our patients is one way we can continue to improve our services. Please take a few minutes to complete the written survey that you may receive in the mail after your visit with us. Thank you!             Your Updated Medication List - Protect others around you: Learn how to safely use, store and throw away your medicines at www.disposemymeds.org.          This list is accurate as of: 1/22/18  9:05 AM.  Always use your most recent med list.                   Brand Name Dispense Instructions for use  Diagnosis    acetaminophen-codeine 300-30 MG per tablet    TYLENOL #3     Take 30 tablets by mouth as needed        albuterol 108 (90 BASE) MCG/ACT Inhaler    PROAIR HFA/PROVENTIL HFA/VENTOLIN HFA    1 Inhaler    Inhale 2 puffs into the lungs every 6 hours as needed for shortness of breath / dyspnea or wheezing    Chronic nonseasonal allergic rhinitis due to fungal spores       benzonatate 100 MG capsule    TESSALON    42 capsule    Take 1 capsule (100 mg) by mouth 3 times daily as needed for cough    Acute non-recurrent maxillary sinusitis       FLUoxetine 10 MG capsule    PROzac    90 capsule    Take 10 mg by mouth every morning        fluticasone 50 MCG/ACT spray    FLONASE    1 Bottle    Spray 1-2 sprays into both nostrils daily    Acute non-recurrent maxillary sinusitis       ibuprofen 600 MG tablet    ADVIL/MOTRIN    30 tablet    Take 1 tablet (600 mg) by mouth every 8 hours as needed for moderate pain        klonoPIN 1 MG tablet   Generic drug:  clonazePAM     90    1 TABLET 2 TIMES DAILY PRN        norgestim-eth estrad triphasic 0.18/0.215/0.25 MG-35 MCG per tablet    ORTHO TRI-CYCLEN, TRI-SPRINTEC    90 tablet    Take 1 tablet by mouth daily    Family planning       ZYRTEC ALLERGY PO      Take by mouth daily as needed
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
## 2018-01-22 NOTE — LETTER
1/22/2018       RE: Lizet Condon  [address]  [facility] [address]     Dear Colleague,    Thank you for referring your patient, Lizet Condon, to the [facility] at [facility]. Please see a copy of my visit note below.    The patient comes to see me in follow-up for her surgery. She underwent open reduction internal fixation of the left distal radius fracture on October 31, 2017. Since her last visit, she states she is doing well. She can lift more and more. She occasionally wears the brace while sleeping for comfort. She still has a little bit of dysesthesia in the palm of the hand but she reports that this is improving. She feels that her motion is much better.    Her incision is well-healed. Sensation is intact in median, radial, and ulnar nerve distributions. Thumb opposition strength is intact. She has 80  of supination, 90  user pronation, 80  of wrist flexion, and 60  of wrist extension    3 views of the left wrist were obtained today and demonstrate no changes in hardware or bony alignment with progressive fracture healing.    She is progressing well. She can advance her activities as tolerated. She can return to full duty at work. I will see her back for a final check in 3 months time.    Again, thank you for allowing me to participate in the care of your patient.      Sincerely,    Rico Berger MD

## 2018-02-15 PROBLEM — M25.532 LEFT WRIST PAIN: Status: RESOLVED | Noted: 2017-11-13 | Resolved: 2018-02-15

## 2018-02-15 PROBLEM — S52.592P: Status: RESOLVED | Noted: 2017-10-27 | Resolved: 2018-02-15

## 2018-02-15 NOTE — PROGRESS NOTES
Discharge Note - Hand Therapy    Current Date:  2/14/2018    Diagnosis:  Left  wrist distal radius fracture with ulna styloid fracture minimally displaced  Date of onset:  10/24/17  Date of hand therapy orders: 11/13/2017  DOS:  10/31/17  Procedure:  L radius ORIF  Post:  10+w   Referring MD: Rico Berger MD    Subjective:   2/14/2018  Spoke with pt on the phone. She is working and getting help with the heavy lifting. Her pain is fine, and she is working on strengthening at home. Pt feels comfortable with discharge from hand therapy at this time as her goals are met and she is IND in HEP.      Objective:  Pain Level Report  VAS(0-10) 11/13/2017 11/20/2017 12/11/2017     At Rest: Can always feel it 1-2/10 Getting better but does continue to have pain   At worst: 7-8/10 5-6/10 taking ibuprofen      Report of Pain:  Location:  Left wrist  Pain Quality:  Dull, pressure  Frequency: intermittent  Pain is worst:  daytime or nighttime   Exacerbated by:  Motion fro extended times  Relieved by:  rest  Progression:  Improving  VISUAL INSPECTION:  DATE 11/13/2017 12/11/2017      Left L   General posture of the upper extremity: []   Normal   [x]  Comments: guarded due to pain, improved after orthosis application Using hand for light tasks, normally but somewhat guarded as expected per healing   Skin Appearance: []   Normal   [x]  Comments: mild ecchymosis normal   Other observations:  steristrips in place volar wrist incision Incision healing well        Sensation:  L thumb: numbness  Present but improving    Edema:  Very mild of the L wrist  Pain Report:  - none    + mild    ++ moderate    +++ severe   Wrist  11/13/2017 11/20/2017   12/11/2017   1/10/2018     AROM (PROM) L L L L   Extension 14+ 36 50 64   Flexion 27+ 35 55 63   RD 10 10 15 20   UD 17 19 36 36   Supination Not measured, about 30 degrees 56 80    Pronation WFL 82 73      Strength:   Pain-free /Pinch Test    1/10/2018   Trials R L   1   80 28          Assessment:  Response to therapy has been improvement to:  ROM of Wrist:  All Planes  Strength:   and pinch and wrist strength  Appropriateness of Rx I have re-evaluated this patient and find that the nature, scope, duration and intensity of the therapy is appropriate for the medical condition of the patient.  Overall Assessment:  Patient's symptoms are resolving.  Patient is independent in home exercise program.  Patient has met Short and Long Term Treatment Goals.  STG/LTG:  See goal sheet for details and updates.    Plan:  Frequency/Duration:  Discharge from Hand Therapy; continue home program.    Home Program:   AROM to wrist  Light strengthening with soup can  Scar massage  Use L hand / wrist for daily activities

## 2018-03-13 ENCOUNTER — DOCUMENTATION ONLY (OUTPATIENT)
Dept: ORTHOPEDICS | Facility: CLINIC | Age: 33
End: 2018-03-13

## 2018-03-13 NOTE — PROGRESS NOTES
Received request of information from [employer]. They are requesting medical bills and records. These forms were faxed to [facility] and [facility].   The form was then scan into the chart.

## 2018-04-24 ENCOUNTER — PRE VISIT (OUTPATIENT)
Dept: ORTHOPEDICS | Facility: CLINIC | Age: 33
End: 2018-04-24

## 2018-04-24 DIAGNOSIS — S52.502A DISTAL RADIUS FRACTURE, LEFT: Primary | ICD-10-CM

## 2018-04-24 NOTE — TELEPHONE ENCOUNTER
Patient is s/p left ORIF distal radius fracture on 10/31/18.    Patient was last seen on 1/22/18 by Dr. Berger.    Patient is coming to clinic for 6 month post-op visit.      Per standing orders, left wrist xrays have been ordered and scheduled.     Patient visit type and questionnaires have been reviewed and are correct for this appointment? Yes     Pinch and : Yes    Hand Therapy:     Liliana England, ATC

## 2018-04-26 ENCOUNTER — TELEPHONE (OUTPATIENT)
Dept: ORTHOPEDICS | Facility: CLINIC | Age: 33
End: 2018-04-26

## 2018-04-26 NOTE — TELEPHONE ENCOUNTER
Called Lizet today to check on the status of her left wrist.   Lizet is s/p ORIF of the left distal radius on 10/31/18. She cancelled an appointment on 4/26/18 due to a conflict.   Dr. Berger recommends: If she is doing well and minimal pain, she can schedule on an as needed basis.   Left her a message to call back.

## 2018-05-27 ENCOUNTER — OFFICE VISIT (OUTPATIENT)
Dept: URGENT CARE | Facility: URGENT CARE | Age: 33
End: 2018-05-27
Payer: COMMERCIAL

## 2018-05-27 ENCOUNTER — HOSPITAL ENCOUNTER (EMERGENCY)
Facility: CLINIC | Age: 33
Discharge: OTHER INSTITUTION | End: 2018-05-27
Attending: EMERGENCY MEDICINE | Admitting: EMERGENCY MEDICINE
Payer: COMMERCIAL

## 2018-05-27 VITALS
HEART RATE: 108 BPM | HEIGHT: 63 IN | WEIGHT: 150 LBS | BODY MASS INDEX: 26.58 KG/M2 | DIASTOLIC BLOOD PRESSURE: 89 MMHG | TEMPERATURE: 99.3 F | SYSTOLIC BLOOD PRESSURE: 130 MMHG | OXYGEN SATURATION: 96 % | RESPIRATION RATE: 18 BRPM

## 2018-05-27 VITALS
WEIGHT: 150 LBS | BODY MASS INDEX: 26.57 KG/M2 | DIASTOLIC BLOOD PRESSURE: 80 MMHG | HEART RATE: 96 BPM | OXYGEN SATURATION: 99 % | SYSTOLIC BLOOD PRESSURE: 140 MMHG | RESPIRATION RATE: 22 BRPM | TEMPERATURE: 98.2 F

## 2018-05-27 DIAGNOSIS — F10.90 ALCOHOL USE DISORDER: ICD-10-CM

## 2018-05-27 DIAGNOSIS — R45.851 SUICIDAL IDEATION: ICD-10-CM

## 2018-05-27 DIAGNOSIS — F32.A DEPRESSION, UNSPECIFIED DEPRESSION TYPE: ICD-10-CM

## 2018-05-27 DIAGNOSIS — F10.930 ALCOHOL WITHDRAWAL SYNDROME WITHOUT COMPLICATION (H): ICD-10-CM

## 2018-05-27 DIAGNOSIS — F10.930 ALCOHOL WITHDRAWAL SYNDROME WITHOUT COMPLICATION (H): Primary | ICD-10-CM

## 2018-05-27 LAB
ALBUMIN SERPL-MCNC: 3.6 G/DL (ref 3.4–5)
ALCOHOL BREATH TEST: 0.09 (ref 0–0.01)
ALP SERPL-CCNC: 55 U/L (ref 40–150)
ALT SERPL W P-5'-P-CCNC: 38 U/L (ref 0–50)
AMPHETAMINES UR QL SCN: NEGATIVE
ANION GAP SERPL CALCULATED.3IONS-SCNC: 11 MMOL/L (ref 3–14)
AST SERPL W P-5'-P-CCNC: 96 U/L (ref 0–45)
BARBITURATES UR QL: NEGATIVE
BASOPHILS # BLD AUTO: 0 10E9/L (ref 0–0.2)
BASOPHILS NFR BLD AUTO: 0.4 %
BENZODIAZ UR QL: NEGATIVE
BILIRUB SERPL-MCNC: 0.5 MG/DL (ref 0.2–1.3)
BUN SERPL-MCNC: 8 MG/DL (ref 7–30)
CALCIUM SERPL-MCNC: 8.4 MG/DL (ref 8.5–10.1)
CANNABINOIDS UR QL SCN: POSITIVE
CHLORIDE SERPL-SCNC: 106 MMOL/L (ref 94–109)
CO2 SERPL-SCNC: 23 MMOL/L (ref 20–32)
COCAINE UR QL: NEGATIVE
CREAT SERPL-MCNC: 0.72 MG/DL (ref 0.52–1.04)
DIFFERENTIAL METHOD BLD: ABNORMAL
EOSINOPHIL # BLD AUTO: 0.1 10E9/L (ref 0–0.7)
EOSINOPHIL NFR BLD AUTO: 1 %
ERYTHROCYTE [DISTWIDTH] IN BLOOD BY AUTOMATED COUNT: 13.5 % (ref 10–15)
GFR SERPL CREATININE-BSD FRML MDRD: >90 ML/MIN/1.7M2
GLUCOSE SERPL-MCNC: 87 MG/DL (ref 70–99)
HCT VFR BLD AUTO: 39.1 % (ref 35–47)
HGB BLD-MCNC: 13.3 G/DL (ref 11.7–15.7)
IMM GRANULOCYTES # BLD: 0 10E9/L (ref 0–0.4)
IMM GRANULOCYTES NFR BLD: 0.2 %
LIPASE SERPL-CCNC: 459 U/L (ref 73–393)
LYMPHOCYTES # BLD AUTO: 2.2 10E9/L (ref 0.8–5.3)
LYMPHOCYTES NFR BLD AUTO: 23.1 %
MCH RBC QN AUTO: 33.6 PG (ref 26.5–33)
MCHC RBC AUTO-ENTMCNC: 34 G/DL (ref 31.5–36.5)
MCV RBC AUTO: 99 FL (ref 78–100)
MONOCYTES # BLD AUTO: 0.4 10E9/L (ref 0–1.3)
MONOCYTES NFR BLD AUTO: 4.7 %
NEUTROPHILS # BLD AUTO: 6.6 10E9/L (ref 1.6–8.3)
NEUTROPHILS NFR BLD AUTO: 70.6 %
NRBC # BLD AUTO: 0 10*3/UL
NRBC BLD AUTO-RTO: 0 /100
OPIATES UR QL SCN: NEGATIVE
PCP UR QL SCN: NEGATIVE
PLATELET # BLD AUTO: 212 10E9/L (ref 150–450)
POTASSIUM SERPL-SCNC: 3.8 MMOL/L (ref 3.4–5.3)
PROT SERPL-MCNC: 7.7 G/DL (ref 6.8–8.8)
RBC # BLD AUTO: 3.96 10E12/L (ref 3.8–5.2)
SODIUM SERPL-SCNC: 140 MMOL/L (ref 133–144)
WBC # BLD AUTO: 9.6 10E9/L (ref 4–11)

## 2018-05-27 PROCEDURE — 96376 TX/PRO/DX INJ SAME DRUG ADON: CPT

## 2018-05-27 PROCEDURE — 80307 DRUG TEST PRSMV CHEM ANLYZR: CPT | Performed by: EMERGENCY MEDICINE

## 2018-05-27 PROCEDURE — 82075 ASSAY OF BREATH ETHANOL: CPT

## 2018-05-27 PROCEDURE — 96374 THER/PROPH/DIAG INJ IV PUSH: CPT

## 2018-05-27 PROCEDURE — 99285 EMERGENCY DEPT VISIT HI MDM: CPT | Mod: 25

## 2018-05-27 PROCEDURE — 96361 HYDRATE IV INFUSION ADD-ON: CPT

## 2018-05-27 PROCEDURE — 96375 TX/PRO/DX INJ NEW DRUG ADDON: CPT

## 2018-05-27 PROCEDURE — 90791 PSYCH DIAGNOSTIC EVALUATION: CPT

## 2018-05-27 PROCEDURE — 85025 COMPLETE CBC W/AUTO DIFF WBC: CPT | Performed by: EMERGENCY MEDICINE

## 2018-05-27 PROCEDURE — 99212 OFFICE O/P EST SF 10 MIN: CPT | Performed by: FAMILY MEDICINE

## 2018-05-27 PROCEDURE — 83690 ASSAY OF LIPASE: CPT | Performed by: EMERGENCY MEDICINE

## 2018-05-27 PROCEDURE — 80053 COMPREHEN METABOLIC PANEL: CPT | Performed by: EMERGENCY MEDICINE

## 2018-05-27 PROCEDURE — 25000128 H RX IP 250 OP 636: Performed by: EMERGENCY MEDICINE

## 2018-05-27 RX ORDER — MULTIVITAMIN,THERAPEUTIC
1 TABLET ORAL ONCE
Status: DISCONTINUED | OUTPATIENT
Start: 2018-05-27 | End: 2018-05-27 | Stop reason: HOSPADM

## 2018-05-27 RX ORDER — LORAZEPAM 2 MG/ML
1 INJECTION INTRAMUSCULAR ONCE
Status: COMPLETED | OUTPATIENT
Start: 2018-05-27 | End: 2018-05-27

## 2018-05-27 RX ORDER — MAGNESIUM OXIDE 400 MG/1
800 TABLET ORAL ONCE
Status: DISCONTINUED | OUTPATIENT
Start: 2018-05-27 | End: 2018-05-27 | Stop reason: HOSPADM

## 2018-05-27 RX ORDER — ONDANSETRON 2 MG/ML
4 INJECTION INTRAMUSCULAR; INTRAVENOUS ONCE
Status: COMPLETED | OUTPATIENT
Start: 2018-05-27 | End: 2018-05-27

## 2018-05-27 RX ORDER — FOLIC ACID 1 MG/1
1 TABLET ORAL ONCE
Status: DISCONTINUED | OUTPATIENT
Start: 2018-05-27 | End: 2018-05-27 | Stop reason: HOSPADM

## 2018-05-27 RX ORDER — LANOLIN ALCOHOL/MO/W.PET/CERES
100 CREAM (GRAM) TOPICAL ONCE
Status: DISCONTINUED | OUTPATIENT
Start: 2018-05-27 | End: 2018-05-27 | Stop reason: HOSPADM

## 2018-05-27 RX ADMIN — LORAZEPAM 1 MG: 2 INJECTION INTRAMUSCULAR; INTRAVENOUS at 11:59

## 2018-05-27 RX ADMIN — SODIUM CHLORIDE 1000 ML: 9 INJECTION, SOLUTION INTRAVENOUS at 10:58

## 2018-05-27 RX ADMIN — LORAZEPAM 1 MG: 2 INJECTION INTRAMUSCULAR; INTRAVENOUS at 12:50

## 2018-05-27 RX ADMIN — LORAZEPAM 1 MG: 2 INJECTION INTRAMUSCULAR; INTRAVENOUS at 10:54

## 2018-05-27 RX ADMIN — ONDANSETRON 4 MG: 2 INJECTION INTRAMUSCULAR; INTRAVENOUS at 11:57

## 2018-05-27 RX ADMIN — ONDANSETRON 4 MG: 2 INJECTION INTRAMUSCULAR; INTRAVENOUS at 10:52

## 2018-05-27 RX ADMIN — SODIUM CHLORIDE 1000 ML: 9 INJECTION, SOLUTION INTRAVENOUS at 12:00

## 2018-05-27 ASSESSMENT — ENCOUNTER SYMPTOMS
HALLUCINATIONS: 0
BLOOD IN STOOL: 0
NAUSEA: 1
VOMITING: 1
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
## 2018-05-27 NOTE — ED NOTES
DEC  contacted [address] and [facility] and there are no female Detox beds available at either location.

## 2018-05-27 NOTE — PROGRESS NOTES
SUBJECTIVE:   Lizet Condon is a 32 year old female who presents to clinic today for the following health issues:      Etoh withdrawal and suicidal -triage note      Duration: in the  last week, withdrawal starting today, shaky in morning/feels sick takes 3 shots and feel better, last etoh yesterday am, intervention by coworker who noticed she was drinking in am    feeling suicidal, does not feel safe    Tearful, feels heart is racing    Here with her mother who can drive her to ER         Problem list and histories reviewed & adjusted, as indicated.  Additional history: as documented    Patient Active Problem List   Diagnosis     Elevated blood pressure reading     Moderate episode of recurrent major depressive disorder (H)     Anxiety     Cervical high risk HPV (human papillomavirus) test positive     Past Surgical History:   Procedure Laterality Date     OPEN REDUCTION INTERNAL FIXATION WRIST Left 10/31/2017    Procedure: OPEN REDUCTION INTERNAL FIXATION WRIST;  Open Reduction Internal Fixation Left Distal Radius Fracture;  Surgeon: Rico Berger MD;  Location: [facility] OR     wisdom teeth removed         Social History   Substance Use Topics     Smoking status: Current Every Day Smoker     Types: Cigarettes     Smokeless tobacco: Never Used      Comment: 1 cigarette a day     Alcohol use 0.0 oz/week     0 Standard drinks or equivalent per week      Comment: 2-5 per week     Family History   Problem Relation Age of Onset     Family History Negative Mother          Current Outpatient Prescriptions   Medication Sig Dispense Refill     albuterol (PROAIR HFA/PROVENTIL HFA/VENTOLIN HFA) 108 (90 BASE) MCG/ACT Inhaler Inhale 2 puffs into the lungs every 6 hours as needed for shortness of breath / dyspnea or wheezing 1 Inhaler 3     Cetirizine HCl (ZYRTEC ALLERGY PO) Take by mouth daily as needed       FLUoxetine (PROZAC) 10 MG capsule Take 10 mg by mouth every morning  90 capsule 3     fluticasone  (FLONASE) 50 MCG/ACT spray Spray 1-2 sprays into both nostrils daily 1 Bottle 11     ibuprofen (ADVIL/MOTRIN) 600 MG tablet Take 1 tablet (600 mg) by mouth every 8 hours as needed for moderate pain 30 tablet 0     KLONOPIN 1 MG OR TABS 1 TABLET 2 TIMES DAILY PRN 90 0     norgestim-eth estrad triphasic (ORTHO TRI-CYCLEN, TRI-SPRINTEC) 0.18/0.215/0.25 MG-35 MCG per tablet Take 1 tablet by mouth daily 90 tablet 3     Allergies   Allergen Reactions     Norco [Hydrocodone-Acetaminophen] Nausea     vicodin caused nausea     Seasonal Allergies      No lab results found.   BP Readings from Last 3 Encounters:   05/27/18 140/80   12/12/17 107/74   11/07/17 124/74    Wt Readings from Last 3 Encounters:   05/27/18 150 lb (68 kg)   01/22/18 144 lb 14.4 oz (65.7 kg)   12/12/17 134 lb (60.8 kg)                  Labs reviewed in EPIC    Reviewed and updated as needed this visit by clinical staff  Tobacco  Allergies  Meds  Soc Hx      Reviewed and updated as needed this visit by Provider         ROS:  Constitutional, HEENT, cardiovascular, pulmonary, gi and gu systems are negative, except as otherwise noted.    OBJECTIVE:     /80  Pulse 96  Temp 98.2  F (36.8  C)  Resp 22  Wt 150 lb (68 kg)  SpO2 99%  BMI 26.57 kg/m2  Body mass index is 26.57 kg/(m^2).   GENERAL: Moderated distress, mild tremors, sweating, tearful  RESP: lungs clear to auscultation - no rales, rhonchi or wheezes  CV: regular rate and rhythm, normal S1 S2, no S3 or S4, no murmur, click or rub, no peripheral edema and peripheral pulses strong    Diagnostic Test Results:  none     ASSESSMENT/PLAN:     Problem List Items Addressed This Visit     None      Visit Diagnoses     Alcohol withdrawal syndrome without complication (H)    -  Primary    Suicidal ideation             Spoke to [facility] and intake no detox beds or inpatient mental health beds as of this am    To  [facility], called and left patient information with Naya who will forward  information to medical staff              Naya Welsh MD  [facility]

## 2018-05-27 NOTE — MR AVS SNAPSHOT
After Visit Summary   5/27/2018    Lizet Condon    MRN: [id number]           Patient Information     Date Of Birth          1985        Visit Information        Provider Department      5/27/2018 9:10 AM Naya Welsh MD [facility]        Today's Diagnoses     Alcohol withdrawal syndrome without complication (H)    -  1    Suicidal ideation           Follow-ups after your visit        Your next 10 appointments already scheduled     May 30, 2018 10:00 AM CDT   Office Visit with Vijaya Weiss MD   [facility]    [address] [facility] [address] [phone]           Bring a current list of meds and any records pertaining to this visit. For Physicals, please bring immunization records and any forms needing to be filled out. Please arrive 10 minutes early to complete paperwork.              Who to contact     If you have questions or need follow up information about today's clinic visit or your schedule please contact [facility] directly at [phone].  Normal or non-critical lab and imaging results will be communicated to you by FeedMagnethart, letter or phone within 4 business days after the clinic has received the results. If you do not hear from us within 7 days, please contact the clinic through [employer] or phone. If you have a critical or abnormal lab result, we will notify you by phone as soon as possible.  Submit refill requests through [employer] or call your pharmacy and they will forward the refill request to us. Please allow 3 business days for your refill to be completed.          Additional Information About Your Visit        MyChart Information     [employer] gives you secure access to your electronic health record. If you see a primary care provider, you can also send messages to your care team and make appointments. If you have questions, please call your  primary care clinic.  If you do not have a primary care provider, please call [phone] and they will assist you.        Care EveryWhere ID     This is your Care EveryWhere ID. This could be used by other organizations to access your [facility] medical records  [id number]        Your Vitals Were     Pulse Temperature Respirations Pulse Oximetry BMI (Body Mass Index)       96 98.2  F (36.8  C) 22 99% 26.57 kg/m2        Blood Pressure from Last 3 Encounters:   05/27/18 140/80   12/12/17 107/74   11/07/17 124/74    Weight from Last 3 Encounters:   05/27/18 150 lb (68 kg)   01/22/18 144 lb 14.4 oz (65.7 kg)   12/12/17 134 lb (60.8 kg)              Today, you had the following     No orders found for display       Primary Care Provider Office Phone # Fax #    Vijaya Weiss [phone] [address] [facility] [address]        Equal Access to Services     [facility] : Hadii aad ku hadasho Soomaali, waaxda luqadaha, qaybta kaalmada adeegyada, hoa guidry haysukh wilson . So [facility] [phone].    ATENCIÓN: Si habla español, tiene a dang disposición servicios gratuitos de asistencia lingüística. Llame al [phone].    We comply with applicable federal civil rights laws and [place] laws. We do not discriminate on the basis of race, color, national origin, age, disability, sex, sexual orientation, or gender identity.            Thank you!     Thank you for choosing [facility]  for your care. Our goal is always to provide you with excellent care. Hearing back from our patients is one way we can continue to improve our services. Please take a few minutes to complete the written survey that you may receive in the mail after your visit with us. Thank you!             Your Updated Medication List - Protect others around you: Learn how to safely use, store and throw away your medicines at www.disposemymeds.org.          This list is accurate as of  5/27/18  9:53 AM.  Always use your most recent med list.                   Brand Name Dispense Instructions for use Diagnosis    albuterol 108 (90 Base) MCG/ACT Inhaler    PROAIR HFA/PROVENTIL HFA/VENTOLIN HFA    1 Inhaler    Inhale 2 puffs into the lungs every 6 hours as needed for shortness of breath / dyspnea or wheezing    Chronic nonseasonal allergic rhinitis due to fungal spores       FLUoxetine 10 MG capsule    PROzac    90 capsule    Take 10 mg by mouth every morning        fluticasone 50 MCG/ACT spray    FLONASE    1 Bottle    Spray 1-2 sprays into both nostrils daily    Acute non-recurrent maxillary sinusitis       ibuprofen 600 MG tablet    ADVIL/MOTRIN    30 tablet    Take 1 tablet (600 mg) by mouth every 8 hours as needed for moderate pain        klonoPIN 1 MG tablet   Generic drug:  clonazePAM     90    1 TABLET 2 TIMES DAILY PRN        norgestim-eth estrad triphasic 0.18/0.215/0.25 MG-35 MCG per tablet    ORTHO TRI-CYCLEN, TRI-SPRINTEC    90 tablet    Take 1 tablet by mouth daily    Family planning       ZYRTEC ALLERGY PO      Take by mouth daily as needed

## 2018-05-27 NOTE — ED NOTES
Pt attempted to quit drinking about 1 week ago but became shaky and nauseated, started drinking again getting more depressed last drink last night

## 2018-05-30 ENCOUNTER — OFFICE VISIT (OUTPATIENT)
Dept: FAMILY MEDICINE | Facility: CLINIC | Age: 33
End: 2018-05-30
Payer: COMMERCIAL

## 2018-05-30 VITALS
RESPIRATION RATE: 16 BRPM | SYSTOLIC BLOOD PRESSURE: 132 MMHG | BODY MASS INDEX: 26.22 KG/M2 | OXYGEN SATURATION: 99 % | DIASTOLIC BLOOD PRESSURE: 88 MMHG | TEMPERATURE: 99.7 F | HEIGHT: 63 IN | WEIGHT: 148 LBS | HEART RATE: 119 BPM

## 2018-05-30 DIAGNOSIS — F33.1 MODERATE EPISODE OF RECURRENT MAJOR DEPRESSIVE DISORDER (H): ICD-10-CM

## 2018-05-30 DIAGNOSIS — F41.9 ANXIETY: ICD-10-CM

## 2018-05-30 DIAGNOSIS — J30.89 CHRONIC NONSEASONAL ALLERGIC RHINITIS DUE TO OTHER ALLERGEN: ICD-10-CM

## 2018-05-30 DIAGNOSIS — F10.10 ALCOHOL ABUSE: Primary | ICD-10-CM

## 2018-05-30 DIAGNOSIS — H10.13 ALLERGIC CONJUNCTIVITIS, BILATERAL: ICD-10-CM

## 2018-05-30 LAB
ALT SERPL W P-5'-P-CCNC: 32 U/L (ref 0–50)
AST SERPL W P-5'-P-CCNC: 34 U/L (ref 0–45)
LIPASE SERPL-CCNC: 388 U/L (ref 73–393)

## 2018-05-30 PROCEDURE — 36415 COLL VENOUS BLD VENIPUNCTURE: CPT | Performed by: FAMILY MEDICINE

## 2018-05-30 PROCEDURE — 99214 OFFICE O/P EST MOD 30 MIN: CPT | Performed by: FAMILY MEDICINE

## 2018-05-30 PROCEDURE — 84460 ALANINE AMINO (ALT) (SGPT): CPT | Performed by: FAMILY MEDICINE

## 2018-05-30 PROCEDURE — 84450 TRANSFERASE (AST) (SGOT): CPT | Performed by: FAMILY MEDICINE

## 2018-05-30 PROCEDURE — 83690 ASSAY OF LIPASE: CPT | Performed by: FAMILY MEDICINE

## 2018-05-30 RX ORDER — HYDROXYZINE HYDROCHLORIDE 25 MG/1
25-50 TABLET, FILM COATED ORAL EVERY 6 HOURS PRN
Qty: 60 TABLET | Refills: 1 | Status: SHIPPED | OUTPATIENT
Start: 2018-05-30 | End: 2021-03-10

## 2018-05-30 RX ORDER — MONTELUKAST SODIUM 10 MG/1
10 TABLET ORAL AT BEDTIME
Qty: 30 TABLET | Refills: 1 | Status: SHIPPED | OUTPATIENT
Start: 2018-05-30 | End: 2018-07-24

## 2018-05-30 RX ORDER — OLOPATADINE HYDROCHLORIDE 1 MG/ML
1 SOLUTION/ DROPS OPHTHALMIC 2 TIMES DAILY
Qty: 5 ML | Refills: 3 | Status: SHIPPED | OUTPATIENT
Start: 2018-05-30 | End: 2019-10-17

## 2018-05-30 ASSESSMENT — ANXIETY QUESTIONNAIRES
3. WORRYING TOO MUCH ABOUT DIFFERENT THINGS: NEARLY EVERY DAY
6. BECOMING EASILY ANNOYED OR IRRITABLE: NEARLY EVERY DAY
GAD7 TOTAL SCORE: 21
5. BEING SO RESTLESS THAT IT IS HARD TO SIT STILL: NEARLY EVERY DAY
1. FEELING NERVOUS, ANXIOUS, OR ON EDGE: NEARLY EVERY DAY
7. FEELING AFRAID AS IF SOMETHING AWFUL MIGHT HAPPEN: NEARLY EVERY DAY
2. NOT BEING ABLE TO STOP OR CONTROL WORRYING: NEARLY EVERY DAY

## 2018-05-30 ASSESSMENT — PATIENT HEALTH QUESTIONNAIRE - PHQ9: 5. POOR APPETITE OR OVEREATING: NEARLY EVERY DAY

## 2018-05-30 NOTE — NURSING NOTE
"Chief Complaint   Patient presents with     Depression     initial /88 (BP Location: Right arm, Cuff Size: Adult Regular)  Pulse 119  Temp 99.7  F (37.6  C) (Oral)  Resp 16  Ht 5' 3\" (1.6 m)  Wt 148 lb (67.1 kg)  LMP 05/13/2018  SpO2 99%  BMI 26.22 kg/m2 Estimated body mass index is 26.22 kg/(m^2) as calculated from the following:    Height as of this encounter: 5' 3\" (1.6 m).    Weight as of this encounter: 148 lb (67.1 kg).  BP completed using cuff size: regular.   R arm      Health Maintenance that is potentially due pending provider review:  phq 9 done today  n/a    Cristian Ibarra ma  "

## 2018-05-30 NOTE — PROGRESS NOTES
Lipase- pancreatic enzyme is normal & that's good  Keep up the good work of staying committed for alcohol abstinence

## 2018-05-30 NOTE — MR AVS SNAPSHOT
After Visit Summary   5/30/2018    Lizet Condon    MRN: [id number]           Patient Information     Date Of Birth          1985        Visit Information        Provider Department      5/30/2018 10:00 AM Vijaya Weiss MD [facility]        Today's Diagnoses     Alcohol abuse    -  1    Anxiety        Moderate episode of recurrent major depressive disorder (H)        Allergic conjunctivitis, bilateral        Chronic nonseasonal allergic rhinitis due to other allergen          Care Instructions    1. Alcohol abuse    - MENTAL HEALTH REFERRAL  - Adult; Outpatient Treatment; Chemical Dependency Treatment; : [facility] [phone]; We will contact you to schedule the appointment or please call with any questions  - hydrOXYzine (ATARAX) 25 MG tablet; Take 1-2 tablets (25-50 mg) by mouth every 6 hours as needed for itching  Dispense: 60 tablet; Refill: 1  - Lipase  - ALT  - AST    2. Anxiety    - MENTAL HEALTH REFERRAL  - Adult; Outpatient Treatment; Individual/Couples/Family/Group Therapy/Health Psychology; [facility] [place]: [facility] [phone]; We will contact you to schedule the appointment or please call with any questions  - hydrOXYzine (ATARAX) 25 MG tablet; Take 1-2 tablets (25-50 mg) by mouth every 6 hours as needed for itching  Dispense: 60 tablet; Refill: 1    3. Moderate episode of recurrent major depressive disorder (H)    - MENTAL HEALTH REFERRAL  - Adult; Outpatient Treatment; Individual/Couples/Family/Group Therapy/Health Psychology; [facility]: [facility] [phone]; We will contact you to schedule the appointment or please call with any questions    4. Allergic conjunctivitis, bilateral    - olopatadine (PATANOL) 0.1 % ophthalmic solution; Place 1 drop into both eyes 2 times daily  Dispense: 5 mL; Refill: 3  - hydrOXYzine (ATARAX) 25 MG tablet; Take 1-2 tablets (25-50 mg) by mouth every 6 hours as needed for  itching  Dispense: 60 tablet; Refill: 1    5. Chronic nonseasonal allergic rhinitis due to other allergen    - montelukast (SINGULAIR) 10 MG tablet; Take 1 tablet (10 mg) by mouth At Bedtime  Dispense: 30 tablet; Refill: 1  - ALLERGY/ASTHMA ADULT REFERRAL  - hydrOXYzine (ATARAX) 25 MG tablet; Take 1-2 tablets (25-50 mg) by mouth every 6 hours as needed for itching  Dispense: 60 tablet; Refill: 1        Vijaya Weiss MD  [facility]          Follow-ups after your visit        Additional Services     ALLERGY/ASTHMA ADULT REFERRAL       Your provider has referred you to: [facility] Allergy/Asthma-List of hospitals in the [place]-[facility] - [phone]  [email or web address]  N: [facility] [phone]   [email or web address]    Please be aware that coverage of these services is subject to the terms and limitations of your health insurance plan.  Call member services at your health plan with any benefit or coverage questions.      Please bring the following with you to your appointment:    (1) Any X-Rays, CTs or MRIs which have been performed.  Contact the facility where they were done to arrange for  prior to your scheduled appointment.    (2) List of current medications  (3) This referral request   (4) Any documents/labs given to you for this referral            MENTAL HEALTH REFERRAL  - Adult; Outpatient Treatment; Chemical Dependency Treatment; [facility] [phone]; We will contact you to schedule the appointment or please call with any questions       All scheduling is subject to the client's specific insurance plan & benefits, provider/location availability, and provider clinical specialities.  Please arrive 15 minutes early for your first appointment and bring your completed paperwork.    Please be aware that coverage of these services is subject to the terms and limitations of your health insurance  plan.  Call member services at your health plan with any benefit or coverage questions.                      MENTAL HEALTH REFERRAL  - Adult; Outpatient Treatment; Individual/Couples/Family/Group Therapy/Health Psychology; [facility] [phone]; We will contact you to schedule the appointment or please call with any questions       All scheduling is subject to the client's specific insurance plan & benefits, provider/location availability, and provider clinical specialities.  Please arrive 15 minutes early for your first appointment and bring your completed paperwork.    Please be aware that coverage of these services is subject to the terms and limitations of your health insurance plan.  Call member services at your health plan with any benefit or coverage questions.                            Who to contact     If you have questions or need follow up information about today's clinic visit or your schedule please contact [facility] directly at [phone].  Normal or non-critical lab and imaging results will be communicated to you by MyChart, letter or phone within 4 business days after the clinic has received the results. If you do not hear from us within 7 days, please contact the clinic through [employer] or phone. If you have a critical or abnormal lab result, we will notify you by phone as soon as possible.  Submit refill requests through [employer] or call your pharmacy and they will forward the refill request to us. Please allow 3 business days for your refill to be completed.          Additional Information About Your Visit        MyChart Information     [employer] gives you secure access to your electronic health record. If you see a primary care provider, you can also send messages to your care team and make appointments. If you have questions, please call your primary care clinic.  If you do not have a primary care provider, please call [phone] and they will assist  "you.        Care EveryWhere ID     This is your Care EveryWhere ID. This could be used by other organizations to access your [place] medical records  [id number]        Your Vitals Were     Pulse Temperature Respirations Height Last Period Pulse Oximetry    119 99.7  F (37.6  C) (Oral) 16 5' 3\" (1.6 m) 05/13/2018 99%    BMI (Body Mass Index)                   26.22 kg/m2            Blood Pressure from Last 3 Encounters:   05/30/18 132/88   05/27/18 130/89   05/27/18 140/80    Weight from Last 3 Encounters:   05/30/18 148 lb (67.1 kg)   05/27/18 150 lb (68 kg)   05/27/18 150 lb (68 kg)              We Performed the Following     ALLERGY/ASTHMA ADULT REFERRAL     ALT     AST     Lipase     MENTAL HEALTH REFERRAL  - Adult; Outpatient Treatment; Individual/Couples/Family/Group Therapy/Health Psychology; G: [facility] [phone]; We will contact you to schedule the appointment or please call with any questions     MENTAL HEALTH REFERRAL  - Adult; Outpatient Treatment; Chemical Dependency Treatment; [facility]: [facility] [phone]; We will contact you to schedule the appointment or please call with any questions          Today's Medication Changes          These changes are accurate as of 5/30/18 10:47 AM.  If you have any questions, ask your nurse or doctor.               Start taking these medicines.        Dose/Directions    hydrOXYzine 25 MG tablet   Commonly known as:  ATARAX   Used for:  Anxiety, Alcohol abuse, Allergic conjunctivitis, bilateral, Chronic nonseasonal allergic rhinitis due to other allergen   Started by:  Vijaya Weiss MD        Dose:  25-50 mg   Take 1-2 tablets (25-50 mg) by mouth every 6 hours as needed for itching   Quantity:  60 tablet   Refills:  1       montelukast 10 MG tablet   Commonly known as:  SINGULAIR   Used for:  Chronic nonseasonal allergic rhinitis due to other allergen   Started by:  Vijaya Weiss MD        Dose:  10 mg   Take 1 " tablet (10 mg) by mouth At Bedtime   Quantity:  30 tablet   Refills:  1       olopatadine 0.1 % ophthalmic solution   Commonly known as:  PATANOL   Used for:  Allergic conjunctivitis, bilateral   Started by:  Vijaya Weiss MD        Dose:  1 drop   Place 1 drop into both eyes 2 times daily   Quantity:  5 mL   Refills:  3            Where to get your medicines      These medications were sent to [facility]/pharmacy #[id number] - [place], [place] - [address]  [address] [address]     Phone:  [phone]     hydrOXYzine 25 MG tablet    montelukast 10 MG tablet    olopatadine 0.1 % ophthalmic solution                Primary Care Provider Office Phone # Fax #    Vijaya Weiss [phone] [address] [address]        Equal Access to Services     [facility] : Hadii neeraj dahl hadasho Soanderson, waaxda luqadaha, qaybta kaalmada gino, hoa wilson . So [facility] [phone].    ATENCIÓN: Si habla español, tiene a dang disposición servicios gratuitos de asistencia lingüística. [facility] [phone].    We comply with applicable federal civil rights laws and [place] laws. We do not discriminate on the basis of race, color, national origin, age, disability, sex, sexual orientation, or gender identity.            Thank you!     Thank you for choosing [facility]  for your care. Our goal is always to provide you with excellent care. Hearing back from our patients is one way we can continue to improve our services. Please take a few minutes to complete the written survey that you may receive in the mail after your visit with us. Thank you!             Your Updated Medication List - Protect others around you: Learn how to safely use, store and throw away your medicines at www.disposemymeds.org.          This list is accurate as of 5/30/18 10:47 AM.  Always use your most recent med list.                   Brand Name  Dispense Instructions for use Diagnosis    albuterol 108 (90 Base) MCG/ACT Inhaler    PROAIR HFA/PROVENTIL HFA/VENTOLIN HFA    1 Inhaler    Inhale 2 puffs into the lungs every 6 hours as needed for shortness of breath / dyspnea or wheezing    Chronic nonseasonal allergic rhinitis due to fungal spores       FLUoxetine 10 MG capsule    PROzac    90 capsule    Take 10 mg by mouth every morning        fluticasone 50 MCG/ACT spray    FLONASE    1 Bottle    Spray 1-2 sprays into both nostrils daily    Acute non-recurrent maxillary sinusitis       hydrOXYzine 25 MG tablet    ATARAX    60 tablet    Take 1-2 tablets (25-50 mg) by mouth every 6 hours as needed for itching    Anxiety, Alcohol abuse, Allergic conjunctivitis, bilateral, Chronic nonseasonal allergic rhinitis due to other allergen       ibuprofen 600 MG tablet    ADVIL/MOTRIN    30 tablet    Take 1 tablet (600 mg) by mouth every 8 hours as needed for moderate pain        klonoPIN 1 MG tablet   Generic drug:  clonazePAM     90    1 TABLET 2 TIMES DAILY PRN        montelukast 10 MG tablet    SINGULAIR    30 tablet    Take 1 tablet (10 mg) by mouth At Bedtime    Chronic nonseasonal allergic rhinitis due to other allergen       norgestim-eth estrad triphasic 0.18/0.215/0.25 MG-35 MCG per tablet    ORTHO TRI-CYCLEN, TRI-SPRINTEC    90 tablet    Take 1 tablet by mouth daily    Family planning       olopatadine 0.1 % ophthalmic solution    PATANOL    5 mL    Place 1 drop into both eyes 2 times daily    Allergic conjunctivitis, bilateral       ZYRTEC ALLERGY PO      Take by mouth daily as needed

## 2018-05-30 NOTE — PATIENT INSTRUCTIONS
1. Alcohol abuse    - MENTAL HEALTH REFERRAL  - Adult; Outpatient Treatment; Chemical Dependency Treatment; : [facility] [phone]; We will contact you to schedule the appointment or please call with any questions  - hydrOXYzine (ATARAX) 25 MG tablet; Take 1-2 tablets (25-50 mg) by mouth every 6 hours as needed for itching  Dispense: 60 tablet; Refill: 1  - Lipase  - ALT  - AST    2. Anxiety    - MENTAL HEALTH REFERRAL  - Adult; Outpatient Treatment; Individual/Couples/Family/Group Therapy/Health Psychology; [facility] [place] [phone]; We will contact you to schedule the appointment or please call with any questions  - hydrOXYzine (ATARAX) 25 MG tablet; Take 1-2 tablets (25-50 mg) by mouth every 6 hours as needed for itching  Dispense: 60 tablet; Refill: 1    3. Moderate episode of recurrent major depressive disorder (H)    - MENTAL HEALTH REFERRAL  - Adult; Outpatient Treatment; Individual/Couples/Family/Group Therapy/Health Psychology; [facility] [phone]; We will contact you to schedule the appointment or please call with any questions    4. Allergic conjunctivitis, bilateral    - olopatadine (PATANOL) 0.1 % ophthalmic solution; Place 1 drop into both eyes 2 times daily  Dispense: 5 mL; Refill: 3  - hydrOXYzine (ATARAX) 25 MG tablet; Take 1-2 tablets (25-50 mg) by mouth every 6 hours as needed for itching  Dispense: 60 tablet; Refill: 1    5. Chronic nonseasonal allergic rhinitis due to other allergen    - montelukast (SINGULAIR) 10 MG tablet; Take 1 tablet (10 mg) by mouth At Bedtime  Dispense: 30 tablet; Refill: 1  - ALLERGY/ASTHMA ADULT REFERRAL  - hydrOXYzine (ATARAX) 25 MG tablet; Take 1-2 tablets (25-50 mg) by mouth every 6 hours as needed for itching  Dispense: 60 tablet; Refill: 1        Vijaya Weiss MD  [facility]
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
## 2018-05-30 NOTE — LETTER
My Depression Action Plan  Name: Liezt Condon   Date of Birth 1985  Date: 5/30/2018    My doctor: Vijaya Weiss   My clinic: [facility]  [address]  [facility] [address] [phone]          GREEN    ZONE   Good Control    What it looks like:     Things are going generally well. You have normal up s and down s. You may even feel depressed from time to time, but bad moods usually last less than a day.   What you need to do:  1. Continue to care for yourself (see self care plan)  2. Check your depression survival kit and update it as needed  3. Follow your physician s recommendations including any medication.  4. Do not stop taking medication unless you consult with your physician first.           YELLOW         ZONE Getting Worse    What it looks like:     Depression is starting to interfere with your life.     It may be hard to get out of bed; you may be starting to isolate yourself from others.    Symptoms of depression are starting to last most all day and this has happened for several days.     You may have suicidal thoughts but they are not constant.   What you need to do:     1. Call your care team, your response to treatment will improve if you keep your care team informed of your progress. Yellow periods are signs an adjustment may need to be made.     2. Continue your self-care, even if you have to fake it!    3. Talk to someone in your support network    4. Open up your depression survival kit           RED    ZONE Medical Alert - Get Help    What it looks like:     Depression is seriously interfering with your life.     You may experience these or other symptoms: You can t get out of bed most days, can t work or engage in other necessary activities, you have trouble taking care of basic hygiene, or basic responsibilities, thoughts of suicide or death that will not go away, self-injurious behavior.     What you need to do:  1. Call your  care team and request a same-day appointment. If they are not available (weekends or after hours) call your local crisis line, emergency room or 911.            Depression Self Care Plan / Survival Kit    Self-Care for Depression  Here s the deal. Your body and mind are really not as separate as most people think.  What you do and think affects how you feel and how you feel influences what you do and think. This means if you do things that people who feel good do, it will help you feel better.  Sometimes this is all it takes.  There is also a place for medication and therapy depending on how severe your depression is, so be sure to consult with your medical provider and/ or Behavioral Health Consultant if your symptoms are worsening or not improving.     In order to better manage my stress, I will:    Exercise  Get some form of exercise, every day. This will help reduce pain and release endorphins, the  feel good  chemicals in your brain. This is almost as good as taking antidepressants!  This is not the same as joining a gym and then never going! (they count on that by the way ) It can be as simple as just going for a walk or doing some gardening, anything that will get you moving.      Hygiene   Maintain good hygiene (Get out of bed in the morning, Make your bed, Brush your teeth, Take a shower, and Get dressed like you were going to work, even if you are unemployed).  If your clothes don't fit try to get ones that do.    Diet  I will strive to eat foods that are good for me, drink plenty of water, and avoid excessive sugar, caffeine, alcohol, and other mood-altering substances.  Some foods that are helpful in depression are: complex carbohydrates, B vitamins, flaxseed, fish or fish oil, fresh fruits and vegetables.    Psychotherapy  I agree to participate in Individual Therapy (if recommended).    Medication  If prescribed medications, I agree to take them.  Missing doses can result in serious side effects.  I  understand that drinking alcohol, or other illicit drug use, may cause potential side effects.  I will not stop my medication abruptly without first discussing it with my provider.    Staying Connected With Others  I will stay in touch with my friends, family members, and my primary care provider/team.    Use your imagination  Be creative.  We all have a creative side; it doesn t matter if it s oil painting, sand castles, or mud pies! This will also kick up the endorphins.    Witness Beauty  (AKA stop and smell the roses) Take a look outside, even in mid-winter. Notice colors, textures. Watch the squirrels and birds.     Service to others  Be of service to others.  There is always someone else in need.  By helping others we can  get out of ourselves  and remember the really important things.  This also provides opportunities for practicing all the other parts of the program.    Humor  Laugh and be silly!  Adjust your TV habits for less news and crime-drama and more comedy.    Control your stress  Try breathing deep, massage therapy, biofeedback, and meditation. Find time to relax each day.     My support system    Clinic Contact:  Phone number:    Contact 1:  Phone number:    Contact 2:  Phone number:    Jehovah's witness/:  Phone number:    Therapist:  Phone number:    Local crisis center:    Phone number:    Other community support:  Phone number:

## 2018-05-31 ASSESSMENT — PATIENT HEALTH QUESTIONNAIRE - PHQ9: SUM OF ALL RESPONSES TO PHQ QUESTIONS 1-9: 23

## 2018-05-31 ASSESSMENT — ANXIETY QUESTIONNAIRES: GAD7 TOTAL SCORE: 21

## 2018-07-24 DIAGNOSIS — J30.89 CHRONIC NONSEASONAL ALLERGIC RHINITIS DUE TO OTHER ALLERGEN: ICD-10-CM

## 2018-07-25 RX ORDER — MONTELUKAST SODIUM 10 MG/1
TABLET ORAL
Qty: 30 TABLET | Refills: 1 | Status: SHIPPED | OUTPATIENT
Start: 2018-07-25 | End: 2018-09-15

## 2018-07-25 NOTE — TELEPHONE ENCOUNTER
"Requested Prescriptions   Pending Prescriptions Disp Refills     montelukast (SINGULAIR) 10 MG tablet [Pharmacy Med Name: MONTELUKAST SOD 10 MG TABLET] 30 tablet 1     Sig: TAKE 1 TABLET (10 MG) BY MOUTH AT BEDTIME    Leukotriene Inhibitors Protocol Passed    7/24/2018  2:00 AM       Passed - Patient is age 12 or older    If patient is under 16, ok to refill using age based dosing.          Passed - Recent (12 mo) or future (30 days) visit within the authorizing provider's specialty    Patient had office visit in the last 12 months or has a visit in the next 30 days with authorizing provider or within the authorizing provider's specialty.  See \"Patient Info\" tab in inbasket, or \"Choose Columns\" in Meds & Orders section of the refill encounter.          Prescription approved per [facility] [place].  "

## 2018-09-15 DIAGNOSIS — J30.89 CHRONIC NONSEASONAL ALLERGIC RHINITIS DUE TO OTHER ALLERGEN: ICD-10-CM

## 2018-09-17 RX ORDER — MONTELUKAST SODIUM 10 MG/1
TABLET ORAL
Qty: 30 TABLET | Refills: 1 | Status: SHIPPED | OUTPATIENT
Start: 2018-09-17 | End: 2018-11-19

## 2018-09-17 NOTE — TELEPHONE ENCOUNTER
"Prescription approved per [facility] Refill Protocol.  Cordelia Eller RN    Requested Prescriptions   Signed Prescriptions Disp Refills     montelukast (SINGULAIR) 10 MG tablet 30 tablet 1     Sig: TAKE 1 TABLET (10 MG) BY MOUTH AT BEDTIME    Leukotriene Inhibitors Protocol Passed    9/15/2018  1:46 AM       Passed - Patient is age 12 or older    If patient is under 16, ok to refill using age based dosing.          Passed - Recent (12 mo) or future (30 days) visit within the authorizing provider's specialty    Patient had office visit in the last 12 months or has a visit in the next 30 days with authorizing provider or within the authorizing provider's specialty.  See \"Patient Info\" tab in inbasket, or \"Choose Columns\" in Meds & Orders section of the refill encounter.          ;ro  "

## 2018-11-19 DIAGNOSIS — J30.89 CHRONIC NON-SEASONAL ALLERGIC RHINITIS: Primary | ICD-10-CM

## 2018-11-19 RX ORDER — MONTELUKAST SODIUM 10 MG/1
TABLET ORAL
Qty: 30 TABLET | Refills: 1 | Status: SHIPPED | OUTPATIENT
Start: 2018-11-19 | End: 2019-01-14

## 2018-12-16 DIAGNOSIS — Z30.09 FAMILY PLANNING: ICD-10-CM

## 2018-12-17 RX ORDER — NORGESTIMATE AND ETHINYL ESTRADIOL 7DAYSX3 28
KIT ORAL
Qty: 28 TABLET | Refills: 0 | Status: SHIPPED | OUTPATIENT
Start: 2018-12-17 | End: 2019-01-13

## 2018-12-17 NOTE — TELEPHONE ENCOUNTER
"Medication is being filled for 1 time refill only due to:  Patient needs to be seen because it has been more than one year since last visit.   Due for physical.  Last done 12/12/17.  Cordelia Eller RN    Requested Prescriptions   Signed Prescriptions Disp Refills     TRI FEMYNOR 0.18/0.215/0.25 MG-35 MCG tablet 28 tablet 0     Sig: TAKE 1 TABLET BY MOUTH DAILY    Contraceptives Protocol Passed - 12/16/2018 12:55 AM       Passed - Patient is not a current smoker if age is 35 or older       Passed - Recent (12 mo) or future (30 days) visit within the authorizing provider's specialty    Patient had office visit in the last 12 months or has a visit in the next 30 days with authorizing provider or within the authorizing provider's specialty.  See \"Patient Info\" tab in inbasket, or \"Choose Columns\" in Meds & Orders section of the refill encounter.             Passed - No active pregnancy on record       Passed - No positive pregnancy test in past 12 months              "

## 2018-12-24 ENCOUNTER — TELEPHONE (OUTPATIENT)
Dept: FAMILY MEDICINE | Facility: CLINIC | Age: 33
End: 2018-12-24

## 2018-12-24 NOTE — TELEPHONE ENCOUNTER
Pt is past due for f/u pap smear.  [facility] clinic and schedule.  Michaela Salguero,    Pap Tracking

## 2019-01-07 PROBLEM — R87.810 CERVICAL HIGH RISK HPV (HUMAN PAPILLOMAVIRUS) TEST POSITIVE: Status: ACTIVE | Noted: 2017-12-12

## 2019-01-13 DIAGNOSIS — Z30.09 FAMILY PLANNING: ICD-10-CM

## 2019-01-14 DIAGNOSIS — J30.89 CHRONIC NON-SEASONAL ALLERGIC RHINITIS: ICD-10-CM

## 2019-01-14 NOTE — TELEPHONE ENCOUNTER
"Patient given one month supply 12/17/18  Due for physical and pap.(positive HPV)  Last 12/22/17  [employer] media message sent to patient via [employer] media.  Cordelia Eller RN    Requested Prescriptions   Pending Prescriptions Disp Refills     TRI FEMYNOR 0.18/0.215/0.25 MG-35 MCG tablet [Pharmacy Med Name: TRI FEMYNOR 28 TABLET] 28 tablet 0     Sig: TAKE 1 TABLET BY MOUTH EVERY DAY    Contraceptives Protocol Passed - 1/13/2019 12:44 AM       Passed - Patient is not a current smoker if age is 35 or older       Passed - Recent (12 mo) or future (30 days) visit within the authorizing provider's specialty    Patient had office visit in the last 12 months or has a visit in the next 30 days with authorizing provider or within the authorizing provider's specialty.  See \"Patient Info\" tab in inbasket, or \"Choose Columns\" in Meds & Orders section of the refill encounter.             Passed - Medication is active on med list       Passed - No active pregnancy on record       Passed - No positive pregnancy test in past 12 months          "

## 2019-01-15 RX ORDER — MONTELUKAST SODIUM 10 MG/1
TABLET ORAL
Qty: 30 TABLET | Refills: 0 | Status: SHIPPED | OUTPATIENT
Start: 2019-01-15 | End: 2019-01-23

## 2019-01-15 NOTE — TELEPHONE ENCOUNTER
"Medication is being filled for 1 time refill only due to:  Patient needs to be seen because due for physical.   Cordelia Eller, ERNESTINA    Requested Prescriptions   Signed Prescriptions Disp Refills     montelukast (SINGULAIR) 10 MG tablet 30 tablet 0     Sig: TAKE 1 TABLET BY MOUTH EVERYDAY AT BEDTIME    Leukotriene Inhibitors Protocol Passed - 1/14/2019  1:52 AM       Passed - Patient is age 12 or older    If patient is under 16, ok to refill using age based dosing.          Passed - Recent (12 mo) or future (30 days) visit within the authorizing provider's specialty    Patient had office visit in the last 12 months or has a visit in the next 30 days with authorizing provider or within the authorizing provider's specialty.  See \"Patient Info\" tab in inbasket, or \"Choose Columns\" in Meds & Orders section of the refill encounter.             Passed - Medication is active on med list            "

## 2019-01-17 NOTE — TELEPHONE ENCOUNTER
A.S,   Left  for patient  See below messages  No response from patient to our outreach  Please advise on further refill  Thanks,  Latosha EDWARD RN

## 2019-01-18 RX ORDER — NORGESTIMATE AND ETHINYL ESTRADIOL 7DAYSX3 28
KIT ORAL
Qty: 28 TABLET | Refills: 0 | Status: SHIPPED | OUTPATIENT
Start: 2019-01-18 | End: 2019-01-23

## 2019-01-18 NOTE — TELEPHONE ENCOUNTER
Medication is being filled for 1 time refill only due to:  Patient needs to be seen because due for physical. Future OV 1/23.   Cordelia Eller RN

## 2019-01-18 NOTE — TELEPHONE ENCOUNTER
Pt is calling to follow up on this. She is scheduled to see dr rosario on 1/23.  She will be out of this medication on Sunday 1/20

## 2019-01-23 ENCOUNTER — OFFICE VISIT (OUTPATIENT)
Dept: FAMILY MEDICINE | Facility: CLINIC | Age: 34
End: 2019-01-23
Payer: COMMERCIAL

## 2019-01-23 ENCOUNTER — RESULT FOLLOW UP (OUTPATIENT)
Dept: FAMILY MEDICINE | Facility: CLINIC | Age: 34
End: 2019-01-23

## 2019-01-23 VITALS
HEIGHT: 63 IN | SYSTOLIC BLOOD PRESSURE: 117 MMHG | BODY MASS INDEX: 23.99 KG/M2 | RESPIRATION RATE: 16 BRPM | DIASTOLIC BLOOD PRESSURE: 74 MMHG | OXYGEN SATURATION: 98 % | HEART RATE: 80 BPM | WEIGHT: 135.4 LBS | TEMPERATURE: 98.4 F

## 2019-01-23 DIAGNOSIS — R87.810 CERVICAL HIGH RISK HPV (HUMAN PAPILLOMAVIRUS) TEST POSITIVE: ICD-10-CM

## 2019-01-23 DIAGNOSIS — F33.1 MODERATE EPISODE OF RECURRENT MAJOR DEPRESSIVE DISORDER (H): ICD-10-CM

## 2019-01-23 DIAGNOSIS — Z23 NEED FOR INFLUENZA VACCINATION: ICD-10-CM

## 2019-01-23 DIAGNOSIS — F41.9 ANXIETY: ICD-10-CM

## 2019-01-23 DIAGNOSIS — Z30.09 FAMILY PLANNING: ICD-10-CM

## 2019-01-23 DIAGNOSIS — R87.810 CERVICAL HIGH RISK HPV (HUMAN PAPILLOMAVIRUS) TEST POSITIVE: Primary | ICD-10-CM

## 2019-01-23 DIAGNOSIS — J30.89 CHRONIC NON-SEASONAL ALLERGIC RHINITIS: ICD-10-CM

## 2019-01-23 PROCEDURE — 87624 HPV HI-RISK TYP POOLED RSLT: CPT | Performed by: FAMILY MEDICINE

## 2019-01-23 PROCEDURE — 90471 IMMUNIZATION ADMIN: CPT | Performed by: FAMILY MEDICINE

## 2019-01-23 PROCEDURE — 99214 OFFICE O/P EST MOD 30 MIN: CPT | Mod: 25 | Performed by: FAMILY MEDICINE

## 2019-01-23 PROCEDURE — 88175 CYTOPATH C/V AUTO FLUID REDO: CPT | Performed by: FAMILY MEDICINE

## 2019-01-23 PROCEDURE — 90686 IIV4 VACC NO PRSV 0.5 ML IM: CPT | Performed by: FAMILY MEDICINE

## 2019-01-23 PROCEDURE — 88141 CYTOPATH C/V INTERPRET: CPT | Performed by: FAMILY MEDICINE

## 2019-01-23 RX ORDER — MONTELUKAST SODIUM 10 MG/1
TABLET ORAL
Qty: 90 TABLET | Refills: 3 | Status: SHIPPED | OUTPATIENT
Start: 2019-01-23 | End: 2019-05-20

## 2019-01-23 RX ORDER — NORGESTIMATE AND ETHINYL ESTRADIOL 7DAYSX3 28
1 KIT ORAL DAILY
Qty: 90 TABLET | Refills: 2 | Status: SHIPPED | OUTPATIENT
Start: 2019-02-15 | End: 2019-05-20

## 2019-01-23 ASSESSMENT — MIFFLIN-ST. JEOR: SCORE: 1288.3

## 2019-01-23 ASSESSMENT — PATIENT HEALTH QUESTIONNAIRE - PHQ9: SUM OF ALL RESPONSES TO PHQ QUESTIONS 1-9: 10

## 2019-01-23 NOTE — LETTER
August 6, 2019      Lizet Condon  [address] [facility] [address]    Dear Ms.Johnson Condon,      At [facility], your health and wellness is our primary concern. That is why we are following up on a colposcopy from 02/06/19. Your provider had recommended that you have a Pap smear completed by 08/06/19. Our records do not show that this has been done or scheduled.    It is important to complete the follow up that your provider has suggested for you to ensure that there are no worsening changes which may, over time, develop into cancer.      Please contact our office at  [phone] to schedule an appointment for a Pap smear at your earliest convenience. If you have questions or concerns, please call the clinic and we will be happy to assist you.    If you have completed the tests outside of [facility], please have the results forwarded to our office. We will update the chart for your primary Physician to review before your next annual physical.     Thank you for choosing [facility]!    Sincerely,      Your [facility] Care Team/[facility]

## 2019-01-23 NOTE — PROGRESS NOTES
SUBJECTIVE:   Lizet Condon is a 33 year old female who presents to clinic today for the following health issues:    1. Follow up on pap smear    2. Plans to continue with  oral contraceptive pills. No side effects   Continues to smoke lightly and has plans to quit smoking but not quite ready yet.    3. Having often nasal congestion, sniffles, some post nasal dripping  Has tried over the counter  nasal spray and antihistamine on and off  No relief      4.she also updated her mental health   Currently on trazodone  mg bedtime  And Prozac 40 mg once daily  Under care of Dr Anguiano -Psychiatrist at [facility] associate   She got  - end of July  Spent time in [facility] 2 month in July & august- for alcohol abuse.   Learnt a lot feels in control   Has some alcohol on and off- feels in control  No hard liquor in house  Was in [place] to see her fa m over [place]- went well        Depression Followup    Status since last visit: Improved     See PHQ-9 for current symptoms.  Other associated symptoms: None    Complicating factors:   Significant life event:  No   Current substance abuse:  None  Anxiety or Panic symptoms:  No    PHQ 12/12/2017 5/30/2018 1/23/2019   PHQ-9 Total Score 8 23 10   Q9: Suicide Ideation Not at all Several days Not at all     In the past two weeks have you had thoughts of suicide or self-harm?  No.    Do you have concerns about your personal safety or the safety of others?   No  PHQ-9  English  PHQ-9   Any Language  Suicide Assessment Five-step Evaluation and Treatment (SAFE-T)    Amount of exercise or physical activity: 4-5 days/week for an average of 45-60 minutes    Problems taking medications regularly: No    Medication side effects: none    Diet: regular (no restrictions)        PROBLEMS TO ADD ON...    Problem list and histories reviewed & adjusted, as indicated.  Additional history: as documented    Patient Active Problem List   Diagnosis     Elevated blood pressure  "reading     Moderate episode of recurrent major depressive disorder (H)     Anxiety     Cervical high risk HPV (human papillomavirus) test positive     Past Surgical History:   Procedure Laterality Date     OPEN REDUCTION INTERNAL FIXATION WRIST Left 10/31/2017    Procedure: OPEN REDUCTION INTERNAL FIXATION WRIST;  Open Reduction Internal Fixation Left Distal Radius Fracture;  Surgeon: Rico Berger MD;  Location: [facility] OR     wisdom teeth removed         Social History     Tobacco Use     Smoking status: Light Tobacco Smoker     Types: Cigarettes     Smokeless tobacco: Never Used     Tobacco comment: 1 cigarette a day   Substance Use Topics     Alcohol use: Yes     Alcohol/week: 0.0 oz     Comment: 1 L vodka/day     Family History   Problem Relation Age of Onset     Family History Negative Mother            Reviewed and updated as needed this visit by clinical staff  Tobacco  Soc Hx      Reviewed and updated as needed this visit by Provider         ROS:  Constitutional, HEENT, cardiovascular, pulmonary, GI, , musculoskeletal, neuro, skin, endocrine and psych systems are negative, except as otherwise noted.    OBJECTIVE:     /74   Pulse 80   Temp 98.4  F (36.9  C) (Oral)   Resp 16   Ht 1.6 m (5' 3\")   Wt 61.4 kg (135 lb 6.4 oz)   SpO2 98%   BMI 23.99 kg/m    Body mass index is 23.99 kg/m .  GENERAL: healthy, alert and no distress  NECK: no adenopathy, no asymmetry, masses, or scars and thyroid normal to palpation  RESP: lungs clear to auscultation - no rales, rhonchi or wheezes  CV: regular rate and rhythm, normal S1 S2, no S3 or S4, no murmur, click or rub, no peripheral edema and peripheral pulses strong  ABDOMEN: soft, nontender, no hepatosplenomegaly, no masses and bowel sounds normal   (female): normal female external genitalia, normal urethral meatus, vaginal mucosa, normal cervix/adnexa/uterus without masses or discharge  Psych: Alert and oriented times 3; coherent speech, normal   rate " and volume, able to articulate logical thoughts, able   to abstract reason, no tangential thoughts, no hallucinations   or delusions      ASSESSMENT/PLAN:   1. Cervical high risk HPV (human papillomavirus) test positive  Plan:  - HPV High Risk Types DNA Cervical  - Pap imaged thin layer diagnostic with HPV (select HPV order below)    2. Family planning  Plan:  - norgestim-eth estrad triphasic (TRI FEMYNOR) 0.18/0.215/0.25 MG-35 MCG tablet; Take 1 tablet by mouth daily  Dispense: 90 tablet; Refill: 2  Potential medication side effects were discussed with the patient; let me know if any occur.  Advised  complete cessation of smoking  3. Chronic non-seasonal allergic rhinitis  Plan:  ontelukast (SINGULAIR) 10 MG tablet; TAKE 1 TABLET BY MOUTH EVERYDAY AT BEDTIME  Dispense: 90 tablet; Refill: 3       Need for influenza vaccination    - ADMIN 1st VACCINE      Depression & anxiety  Plan: Under care of Dr Johnson  Currently on prozac 40 mg once daily  Trazodone  mg once daily   And continue counseling /therapst weekly  encouraged patient for complete alcohol cessation  Vijaya Weiss MD  [facility]

## 2019-01-23 NOTE — NURSING NOTE
"Chief Complaint   Patient presents with     RECHECK     Depression follow up     /74   Pulse 80   Temp 98.4  F (36.9  C) (Oral)   Resp 16   Ht 1.6 m (5' 3\")   Wt 61.4 kg (135 lb 6.4 oz)   SpO2 98%   BMI 23.99 kg/m   Estimated body mass index is 23.99 kg/m  as calculated from the following:    Height as of this encounter: 1.6 m (5' 3\").    Weight as of this encounter: 61.4 kg (135 lb 6.4 oz).  Medication Reconciliation: complete        Health Maintenance Due Pending Provider Review:  Pap Smear    Pt will schedule Physical appt.    Bernie Lewis MA Lead  [facility]  [phone]  "

## 2019-01-24 ASSESSMENT — ANXIETY QUESTIONNAIRES
7. FEELING AFRAID AS IF SOMETHING AWFUL MIGHT HAPPEN: MORE THAN HALF THE DAYS
3. WORRYING TOO MUCH ABOUT DIFFERENT THINGS: MORE THAN HALF THE DAYS
6. BECOMING EASILY ANNOYED OR IRRITABLE: SEVERAL DAYS
GAD7 TOTAL SCORE: 9
2. NOT BEING ABLE TO STOP OR CONTROL WORRYING: SEVERAL DAYS
IF YOU CHECKED OFF ANY PROBLEMS ON THIS QUESTIONNAIRE, HOW DIFFICULT HAVE THESE PROBLEMS MADE IT FOR YOU TO DO YOUR WORK, TAKE CARE OF THINGS AT HOME, OR GET ALONG WITH OTHER PEOPLE: SOMEWHAT DIFFICULT
5. BEING SO RESTLESS THAT IT IS HARD TO SIT STILL: NOT AT ALL
1. FEELING NERVOUS, ANXIOUS, OR ON EDGE: SEVERAL DAYS

## 2019-01-24 ASSESSMENT — PATIENT HEALTH QUESTIONNAIRE - PHQ9: 5. POOR APPETITE OR OVEREATING: MORE THAN HALF THE DAYS

## 2019-01-25 ASSESSMENT — ANXIETY QUESTIONNAIRES: GAD7 TOTAL SCORE: 9

## 2019-01-25 NOTE — PATIENT INSTRUCTIONS
Complete smoke cessation  Avoid alcohol  Report any side effects from  Medications   Follow up if nasal congestion not better in 4 weeks

## 2019-01-26 LAB
COPATH REPORT: ABNORMAL
PAP: ABNORMAL

## 2019-01-29 LAB
FINAL DIAGNOSIS: ABNORMAL
HPV HR 12 DNA CVX QL NAA+PROBE: POSITIVE
HPV16 DNA SPEC QL NAA+PROBE: NEGATIVE
HPV18 DNA SPEC QL NAA+PROBE: POSITIVE
SPECIMEN DESCRIPTION: ABNORMAL
SPECIMEN SOURCE CVX/VAG CYTO: ABNORMAL

## 2019-01-30 NOTE — PROGRESS NOTES
12/12/17 NIL pap, + HR HPV (not 16/18). Plan 1 year cotest  01/07/19 Patient is lost to pap tracking follow-up.   01/23/19: [facility] pap, + HR HPV types 18 and other result. Plan [place].  01/30/19: Msg left to call back. Pt was advised.  02/06/19: [place] Bx a minute amount of mucin and neutrophils, no tissue identified. ECC Neg for dysplasia. Plan Pap in 6 months per provider. Due by 08/06/19.  03/04/19: Msg left to call back.   03/07/19: Msg left to call back.   03/11/19: Tc to mail a [place] result and recommendation letter. I also released this to the pt via oDesk.   03/11/19 Result letter sent at request of RN. ([facility])  07/23/19 [employer] Interactive pap reminder message sent. ([facility])  08/06/19 RFI Informatiquet not read, letter sent. ([facility])  09/03/19 [facility] clinic and schedule. ([facility])  10/01/19 Patient is lost to pap tracking follow-up. FYI routed to provider. ([facility])

## 2019-01-31 ENCOUNTER — TELEPHONE (OUTPATIENT)
Dept: FAMILY MEDICINE | Facility: CLINIC | Age: 34
End: 2019-01-31

## 2019-01-31 NOTE — TELEPHONE ENCOUNTER
Reason for Call:  appointment    Detailed comments: Please all to schedule her Colposcopy     Phone Number Patient can be reached at: Home number on file [phone] (home)    Best Time: anytime    Can we leave a detailed message on this number? YES    Call taken on 1/31/2019 at 1:00 PM by Sage Diaz

## 2019-02-05 NOTE — TELEPHONE ENCOUNTER
Patient called and scheduled for a colposcopy tomorrow 2/6/19  [facility] Coordinator

## 2019-02-06 ENCOUNTER — OFFICE VISIT (OUTPATIENT)
Dept: FAMILY MEDICINE | Facility: CLINIC | Age: 34
End: 2019-02-06
Payer: COMMERCIAL

## 2019-02-06 VITALS
BODY MASS INDEX: 24.45 KG/M2 | SYSTOLIC BLOOD PRESSURE: 127 MMHG | RESPIRATION RATE: 14 BRPM | OXYGEN SATURATION: 97 % | HEIGHT: 63 IN | WEIGHT: 138 LBS | TEMPERATURE: 98.6 F | DIASTOLIC BLOOD PRESSURE: 76 MMHG | HEART RATE: 79 BPM

## 2019-02-06 DIAGNOSIS — Z01.818 PREPROCEDURAL EXAMINATION: ICD-10-CM

## 2019-02-06 DIAGNOSIS — R87.810 ASCUS WITH POSITIVE HIGH RISK HPV CERVICAL: Primary | ICD-10-CM

## 2019-02-06 DIAGNOSIS — R87.610 ASCUS WITH POSITIVE HIGH RISK HPV CERVICAL: Primary | ICD-10-CM

## 2019-02-06 LAB — BETA HCG QUAL IFA URINE: NEGATIVE

## 2019-02-06 PROCEDURE — 84703 CHORIONIC GONADOTROPIN ASSAY: CPT | Performed by: FAMILY MEDICINE

## 2019-02-06 PROCEDURE — 57454 BX/CURETT OF CERVIX W/SCOPE: CPT | Performed by: FAMILY MEDICINE

## 2019-02-06 PROCEDURE — 88305 TISSUE EXAM BY PATHOLOGIST: CPT | Performed by: FAMILY MEDICINE

## 2019-02-06 ASSESSMENT — MIFFLIN-ST. JEOR: SCORE: 1300.09

## 2019-02-06 NOTE — NURSING NOTE
"Chief Complaint   Patient presents with     Colposcopy       Initial /76   Pulse 79   Temp 98.6  F (37  C) (Oral)   Resp 14   Ht 1.6 m (5' 3\")   Wt 62.6 kg (138 lb)   LMP 01/16/2019 (Approximate)   SpO2 97%   Breastfeeding? No   BMI 24.45 kg/m   Estimated body mass index is 24.45 kg/m  as calculated from the following:    Height as of this encounter: 1.6 m (5' 3\").    Weight as of this encounter: 62.6 kg (138 lb).  BP completed using cuff size: regular    Health Maintenance that is potentially due pending provider review:  Health Maintenance Due   Topic Date Due     DEPRESSION ACTION PLAN  10/13/2003         Per provider  "

## 2019-02-06 NOTE — PROGRESS NOTES
SUBJECTIVE:   Lizet Condon is a 33 year old female who presents to clinic today for the following health issues:      Lizet Condon is a .33 year old female who presents for initial colposcopy, referred by Dr Weiss. Pap smear 1 months ago showed: ASC-US with HR HPV 18 and other . The prior pap showed normal with high risk HPV other in 2017.    Past Medical History:   Diagnosis Date     Abnormal Pap smear of cervix 01/23/2019 01/23/19: See problem list.     Anxiety      Cervical high risk HPV (human papillomavirus) test positive 12/12/2017, 01/23/19    (not 16/18) 01/23/19: See problem list.     Depression      Left wrist fracture      Seasonal allergies      Family History   Problem Relation Age of Onset     Family History Negative Mother        Previous history of abnormal paps?: No  History of cryotherapy (freezing)?: : No  History of veneral diseases: : No  Do you desire testing for any of these diseases? : No  History of genital warts:  No  Visible warts now?:  No  Previously treated? If so, how?:  No     Patient's last menstrual period was 01/16/2019 (approximate).    Type of contraception: oral contraceptive  Age at first sexual intercourse: 17  Number of sexual partners (lifetime): >10  History   Smoking Status     Light Tobacco Smoker     Types: Cigarettes   Smokeless Tobacco     Never Used     Comment: 1 cigarette a day       History of sexual abuse:  No    Allergies   Allergen Reactions     Norco [Hydrocodone-Acetaminophen] Nausea     vicodin caused nausea     Seasonal Allergies        PROCEDURE:  Before the procedure, it was ensured that the patient was educated regarding the nature of her findings to date, the implications of them, and what was to be done. She has been made aware of the role of HPV, the natural history of infection, ways to minimize her future risk, the effect of HPV on the cervix, and treatment options available should they be indicated. The   pathophysiology of  the cervix, including a discussion of squamous vs. endometrial cells, and squamous metaplasia have all been reviewed, using illustrations and sketches. The details of the colposcopic procedure were reviewed, as well as the risks of missed diagnoses, pain, infection and bleeding. All questions were answered before proceeding, and informed consent was therefore obtained.    Bimanual examination: was performed and was unremarkable.    The following examination was done with the colposcope:    Unenhanced examination of the cervix was normal without lesions.  Pap smear and endocervical sampling not obtained due to:    not due  Please refer to images section for details!  Pap repeated?:  No  SCJ seen?:  no  Endocervical speculum needed?:  Yes   ECC done?:  Yes   Lugol's solution used?:  Yes   Satisfactory examination?:  no    Vaginal vault: normal to cursory inspection   Urethra normal?:  yes  Labia normal?:  yes  Perineum normal?:  yes  Rectum normal?:  yes    FINDINGS:  Please see image   Cervix: acetowhite area(s) at 6:00  Procedure: biopsies taken (not including ECC): 1.    Procedure summary: Patient tolerated procedure well     Assessment: HPV related changes      Plan: Specimens labelled and sent to pathology., Will base further treatment on pathology findings. and post biopsy instructions given to patient

## 2019-02-10 LAB — COPATH REPORT: NORMAL

## 2019-02-28 ENCOUNTER — TELEPHONE (OUTPATIENT)
Dept: FAMILY MEDICINE | Facility: CLINIC | Age: 34
End: 2019-02-28

## 2019-02-28 NOTE — LETTER
[facility]  [address] [place] [address]  [facility] [id number] [phone]          March 11, 2019    Lizet Condon                                                                                                                     [address] [facility] [address]            Dear Lizet,      We attempted to contact you by phone and were unsuccessful. Your recent Colposcopy Biopsy result was normal but Dr. Valladares would like you to repeat a pap in 6 months due not being able to view SC Junction during the colp (part of the cervix). This is due by 08/06/19. If you have any further questions please call me at [phone].       Sincerely,         Vinita Valladares MD./  Swati Valladares RN-Pap Tracking

## 2019-03-01 NOTE — TELEPHONE ENCOUNTER
"Hi Dr. Fairbanks,  Please review [place] results and make recommendations.   Thanks,  Swati Valladares, RN-Pap Tracking     Collected: 2/6/2019   Received: 2/7/2019   Reported: 2/10/2019 14:21   Ordering Phy(s): KAYLA FAIRBANKS     For improved result formatting, select 'View Enhanced Report Format' under    Linked Documents section.     SPECIMEN(S):   A: Cervical biopsy, 6 o'clock   B: Endocervical curettings     FINAL DIAGNOSIS:   A.  Cervix at 6:00, biopsy:   - A minute amount of mucin and neutrophils, no tissue identified.     Nondiagnostic.     B.  Endocervix, curettings:   - Multiple fragments of normal endocervical tissue.   - Multiple fragments of normal mature squamous epithelium.     Electronically signed out by:     Sugar Alcantara M.D.     CLINICAL HISTORY:   33 year old female.  ASCUS pap smear [id number] and positive high risk HPV   16 and other, not 16, on 1/23/2019.     GROSS:   A. The specimen is received in formalin, labeled with the patient's name   and date of birth, and designated   \"cervix 6:00\". It consists of one fragment of tan-white soft material   admixed with a scant amount of mucus,   measuring up to 0.2 cm in greatest dimension.  Entirely submitted in one   cassette.     B. The specimen is received in formalin, labeled with the patient's name   and date of birth, and designated   \"ECC\". It consists of a Cytobrush in formalin, from which a 1.0 x 0.5 x   0.1 cm aggregate of tan-white, mucoid   material is obtained. Wrapped and entirely submitted in one cassette.   (Dictated by: ALEX Julio(ASCP)   2/7/2019 12:00 PM)   "

## 2019-03-04 NOTE — TELEPHONE ENCOUNTER
I could not see the S C  Junction during the colp , so ECC is normal , I would recommend repeat PAP in 6 month   Thanks

## 2019-03-04 NOTE — TELEPHONE ENCOUNTER
Hi Dr Valladares,   Would you also consider this a [place] result? Please advise and I will inform the pt.   Thanks,  Swati Valladares, RN-Pap Tracking     A: Cervical biopsy, 6 o'clock   B: Endocervical curettings     FINAL DIAGNOSIS:   A.  Cervix at 6:00, biopsy:   - A minute amount of mucin and neutrophils, no tissue identified.     Nondiagnostic.     B.  Endocervix, curettings:   - Multiple fragments of normal endocervical tissue.   - Multiple fragments of normal mature squamous epithelium.

## 2019-03-04 NOTE — TELEPHONE ENCOUNTER
Left message for patient to return my call to [phone].  Swati Valladares RN-Pap Tracking

## 2019-03-07 NOTE — TELEPHONE ENCOUNTER
Left message for patient to return my call to [phone].  Swati Valladares RN-Pap Tracking

## 2019-03-11 NOTE — TELEPHONE ENCOUNTER
Pt didn't return the call.  Please mail saved [place] result letter to the pt. (Tracking)  Thanks,  Swati Valladares RN-Pap

## 2019-03-11 NOTE — TELEPHONE ENCOUNTER
I also released this [place] result letter to the pt via [employer].  Swati Valladares RN-Pap Tracking

## 2019-05-10 DIAGNOSIS — Z30.09 FAMILY PLANNING: ICD-10-CM

## 2019-05-10 RX ORDER — NORGESTIMATE AND ETHINYL ESTRADIOL 7DAYSX3 28
KIT ORAL
Start: 2019-05-10

## 2019-05-10 NOTE — TELEPHONE ENCOUNTER
"Too soon.  Rx sent 2/15/19 for #90 with 2 refills.  Cordelia Eller RN    Requested Prescriptions   Refused Prescriptions Disp Refills     TRI FEMYNOR 0.18/0.215/0.25 MG-35 MCG tablet [Pharmacy Med Name: TRI FEMYNOR 28 TABLET]       Sig: TAKE 1 TABLET BY MOUTH EVERY DAY       Contraceptives Protocol Passed - 5/10/2019  1:28 AM        Passed - Patient is not a current smoker if age is 35 or older        Passed - Recent (12 mo) or future (30 days) visit within the authorizing provider's specialty     Patient had office visit in the last 12 months or has a visit in the next 30 days with authorizing provider or within the authorizing provider's specialty.  See \"Patient Info\" tab in inbasket, or \"Choose Columns\" in Meds & Orders section of the refill encounter.              Passed - Medication is active on med list        Passed - No active pregnancy on record        Passed - No positive pregnancy test in past 12 months        .  "

## 2019-05-13 ENCOUNTER — MYC REFILL (OUTPATIENT)
Dept: FAMILY MEDICINE | Facility: CLINIC | Age: 34
End: 2019-05-13

## 2019-05-13 DIAGNOSIS — Z30.09 FAMILY PLANNING: ICD-10-CM

## 2019-05-14 RX ORDER — NORGESTIMATE AND ETHINYL ESTRADIOL 7DAYSX3 28
1 KIT ORAL DAILY
Refills: 0
Start: 2019-05-14

## 2019-05-20 ENCOUNTER — OFFICE VISIT (OUTPATIENT)
Dept: FAMILY MEDICINE | Facility: CLINIC | Age: 34
End: 2019-05-20
Payer: COMMERCIAL

## 2019-05-20 VITALS
HEART RATE: 91 BPM | WEIGHT: 142.6 LBS | DIASTOLIC BLOOD PRESSURE: 72 MMHG | TEMPERATURE: 98.4 F | RESPIRATION RATE: 16 BRPM | BODY MASS INDEX: 25.27 KG/M2 | HEIGHT: 63 IN | OXYGEN SATURATION: 100 % | SYSTOLIC BLOOD PRESSURE: 117 MMHG

## 2019-05-20 DIAGNOSIS — Z00.00 ROUTINE GENERAL MEDICAL EXAMINATION AT A HEALTH CARE FACILITY: Primary | ICD-10-CM

## 2019-05-20 DIAGNOSIS — J30.89 CHRONIC NON-SEASONAL ALLERGIC RHINITIS: ICD-10-CM

## 2019-05-20 DIAGNOSIS — B96.89 BACTERIAL VAGINOSIS: ICD-10-CM

## 2019-05-20 DIAGNOSIS — R87.810 ASCUS WITH POSITIVE HIGH RISK HPV CERVICAL: ICD-10-CM

## 2019-05-20 DIAGNOSIS — Z11.3 SCREEN FOR STD (SEXUALLY TRANSMITTED DISEASE): ICD-10-CM

## 2019-05-20 DIAGNOSIS — Z30.09 FAMILY PLANNING: ICD-10-CM

## 2019-05-20 DIAGNOSIS — L50.9 URTICARIA: ICD-10-CM

## 2019-05-20 DIAGNOSIS — R87.610 ASCUS WITH POSITIVE HIGH RISK HPV CERVICAL: ICD-10-CM

## 2019-05-20 DIAGNOSIS — N76.0 BACTERIAL VAGINOSIS: ICD-10-CM

## 2019-05-20 DIAGNOSIS — F33.1 MODERATE EPISODE OF RECURRENT MAJOR DEPRESSIVE DISORDER (H): ICD-10-CM

## 2019-05-20 DIAGNOSIS — H10.13 ALLERGIC CONJUNCTIVITIS, BILATERAL: ICD-10-CM

## 2019-05-20 DIAGNOSIS — F41.9 ANXIETY: ICD-10-CM

## 2019-05-20 LAB
SPECIMEN SOURCE: ABNORMAL
WET PREP SPEC: ABNORMAL

## 2019-05-20 PROCEDURE — 86780 TREPONEMA PALLIDUM: CPT | Performed by: FAMILY MEDICINE

## 2019-05-20 PROCEDURE — 36415 COLL VENOUS BLD VENIPUNCTURE: CPT | Performed by: FAMILY MEDICINE

## 2019-05-20 PROCEDURE — 87491 CHLMYD TRACH DNA AMP PROBE: CPT | Performed by: FAMILY MEDICINE

## 2019-05-20 PROCEDURE — 99213 OFFICE O/P EST LOW 20 MIN: CPT | Mod: 25 | Performed by: FAMILY MEDICINE

## 2019-05-20 PROCEDURE — 87210 SMEAR WET MOUNT SALINE/INK: CPT | Performed by: FAMILY MEDICINE

## 2019-05-20 PROCEDURE — 99395 PREV VISIT EST AGE 18-39: CPT | Performed by: FAMILY MEDICINE

## 2019-05-20 PROCEDURE — 87591 N.GONORRHOEAE DNA AMP PROB: CPT | Performed by: FAMILY MEDICINE

## 2019-05-20 PROCEDURE — 87389 HIV-1 AG W/HIV-1&-2 AB AG IA: CPT | Performed by: FAMILY MEDICINE

## 2019-05-20 RX ORDER — PREDNISONE 20 MG/1
20 TABLET ORAL DAILY
Qty: 5 TABLET | Refills: 0 | Status: SHIPPED | OUTPATIENT
Start: 2019-05-20 | End: 2019-10-17

## 2019-05-20 RX ORDER — OLOPATADINE HYDROCHLORIDE 1 MG/ML
1 SOLUTION/ DROPS OPHTHALMIC 2 TIMES DAILY
Qty: 1 BOTTLE | Refills: 1 | Status: SHIPPED | OUTPATIENT
Start: 2019-05-20 | End: 2019-10-17

## 2019-05-20 RX ORDER — OLOPATADINE HYDROCHLORIDE 1 MG/ML
1 SOLUTION/ DROPS OPHTHALMIC 2 TIMES DAILY
Qty: 5 ML | Refills: 11 | Status: SHIPPED | OUTPATIENT
Start: 2019-05-20 | End: 2022-11-08

## 2019-05-20 RX ORDER — NORGESTIMATE AND ETHINYL ESTRADIOL 7DAYSX3 28
1 KIT ORAL DAILY
Qty: 90 TABLET | Refills: 3 | Status: SHIPPED | OUTPATIENT
Start: 2019-05-20 | End: 2020-04-14

## 2019-05-20 RX ORDER — ALBUTEROL SULFATE 90 UG/1
2 AEROSOL, METERED RESPIRATORY (INHALATION) EVERY 6 HOURS PRN
Qty: 18 G | Refills: 11 | Status: SHIPPED | OUTPATIENT
Start: 2019-05-20 | End: 2021-03-10

## 2019-05-20 RX ORDER — MONTELUKAST SODIUM 10 MG/1
TABLET ORAL
Qty: 90 TABLET | Refills: 3 | Status: SHIPPED | OUTPATIENT
Start: 2019-05-20 | End: 2021-03-10

## 2019-05-20 RX ORDER — METRONIDAZOLE 500 MG/1
500 TABLET ORAL 2 TIMES DAILY
Qty: 14 TABLET | Refills: 0 | Status: SHIPPED | OUTPATIENT
Start: 2019-05-20 | End: 2019-10-17

## 2019-05-20 ASSESSMENT — MIFFLIN-ST. JEOR: SCORE: 1320.96

## 2019-05-20 NOTE — PATIENT INSTRUCTIONS
Short course of prednisone 20 mg once daily  See allergist  Start over the counter zyrtec or Claritin once daily    Preventive Health Recommendations  Female Ages 26 - 39  Yearly exam:   See your health care provider every year in order to    Review health changes.     Discuss preventive care.      Review your medicines if you your doctor has prescribed any.    Until age 30: Get a Pap test every three years (more often if you have had an abnormal result).    After age 30: Talk to your doctor about whether you should have a Pap test every 3 years or have a Pap test with HPV screening every 5 years.   You do not need a Pap test if your uterus was removed (hysterectomy) and you have not had cancer.  You should be tested each year for STDs (sexually transmitted diseases), if you're at risk.   Talk to your provider about how often to have your cholesterol checked.  If you are at risk for diabetes, you should have a diabetes test (fasting glucose).  Shots: Get a flu shot each year. Get a tetanus shot every 10 years.   Nutrition:     Eat at least 5 servings of fruits and vegetables each day.    Eat whole-grain bread, whole-wheat pasta and brown rice instead of white grains and rice.    Get adequate Calcium and Vitamin D.     Lifestyle    Exercise at least 150 minutes a week (30 minutes a day, 5 days of the week). This will help you control your weight and prevent disease.    Limit alcohol to one drink per day.    No smoking.     Wear sunscreen to prevent skin cancer.    See your dentist every six months for an exam and cleaning.

## 2019-05-20 NOTE — PROGRESS NOTES
SUBJECTIVE:   CC: Lizet Condon is an 33 year old woman who presents for preventive health visit.     Healthy Habits:    Getting at least 3 servings of Calcium per day:  Yes    Bi-annual eye exam:  Yes    Dental care twice a year:  NO    Sleep apnea or symptoms of sleep apnea:  Daytime drowsiness    Diet:  Regular (no restrictions)    Frequency of exercise:  6-7 days/week    Duration of exercise:  45-60 minutes    Taking medications regularly:  Yes    Medication side effects:  None    PHQ-2 Total Score:    Additional concerns today:  Yes  strong personal and family history of seasonal allergies - now its year round but worse in spring  Would like refill on singular and albuterol  Was not able to work for 2 days because of allergy symptoms, need a note  Sudden rash, itchy on and off, wants to see allergist .  Broke out into lower leg rash in the clinic .      Prozac 10 mg once daily  helps depression and anxiety , under care  of psychiatrist.also see alonzo-sandy valdez at [facility]     Wants sexually transmitted infection screen        Today's PHQ-2 Score:   PHQ-2 ( 1999 Pf[employer]) 5/20/2019   Q1: Little interest or pleasure in doing things 1   Q2: Feeling down, depressed or hopeless 1   PHQ-2 Score 2   Q1: Little interest or pleasure in doing things Several days   Q2: Feeling down, depressed or hopeless Several days   PHQ-2 Score 2       Abuse: Current or Past(Physical, Sexual or Emotional)- No  Do you feel safe in your environment? Yes    Social History     Tobacco Use     Smoking status: Light Tobacco Smoker     Types: Cigarettes     Smokeless tobacco: Never Used     Tobacco comment: 1 cigarette a day   Substance Use Topics     Alcohol use: Yes     Alcohol/week: 0.0 oz     Comment: beer or wine 3x week     If you drink alcohol do you typically have >3 drinks per day or >7 drinks per week? No    Alcohol Use 5/20/2019   Prescreen: >3 drinks/day or >7 drinks/week? No   Prescreen: >3 drinks/day or >7  "drinks/week? -   No flowsheet data found.    Reviewed orders with patient.  Reviewed health maintenance and updated orders accordingly - Yes  BP Readings from Last 3 Encounters:   05/20/19 117/72   02/06/19 127/76   01/23/19 117/74    Wt Readings from Last 3 Encounters:   05/20/19 64.7 kg (142 lb 9.6 oz)   02/06/19 62.6 kg (138 lb)   01/23/19 61.4 kg (135 lb 6.4 oz)                    Mammogram not appropriate for this patient based on age.    Pertinent mammograms are reviewed under the imaging tab.  History of abnormal Pap smear: YES - updated in Problem List and Health Maintenance accordingly  PAP / HPV Latest Ref Rng & Units 1/23/2019 12/12/2017   PAP - ASC-US(A) NIL   HPV 16 DNA NEG:Negative Negative Negative   HPV 18 DNA NEG:Negative Positive(A) Negative   OTHER HR HPV NEG:Negative Positive(A) Positive(A)     Reviewed and updated as needed this visit by clinical staff  Tobacco  Allergies  Meds  Problems  Med Hx  Surg Hx  Fam Hx  Soc Hx          Reviewed and updated as needed this visit by Provider            Review of Systems  CONSTITUTIONAL: NEGATIVE for fever, chills, change in weight  INTEGUMENTARU/SKIN: NEGATIVE for worrisome rashes, moles or lesions  EYES: NEGATIVE for vision changes or irritation  ENT: NEGATIVE for ear, mouth and throat problems  RESP: NEGATIVE for significant cough or SOB  BREAST: NEGATIVE for masses, tenderness or discharge  CV: NEGATIVE for chest pain, palpitations or peripheral edema  GI: NEGATIVE for nausea, abdominal pain, heartburn, or change in bowel habits  : NEGATIVE for unusual urinary or vaginal symptoms. Periods are regular.  MUSCULOSKELETAL: NEGATIVE for significant arthralgias or myalgia  NEURO: NEGATIVE for weakness, dizziness or paresthesias  PSYCHIATRIC: NEGATIVE for changes in mood or affect     OBJECTIVE:   /72   Pulse 91   Temp 98.4  F (36.9  C) (Oral)   Resp 16   Ht 1.6 m (5' 3\")   Wt 64.7 kg (142 lb 9.6 oz)   LMP 05/18/2019 (Approximate)   SpO2 " 100%   BMI 25.26 kg/m    Physical Exam  GENERAL: healthy, alert and no distress  EYES: Eyes grossly normal to inspection, PERRL and conjunctivae and sclerae normal  HENT: ear canals and TM's normal, nose and mouth without ulcers or lesions  NECK: no adenopathy, no asymmetry, masses, or scars and thyroid normal to palpation  RESP: lungs clear to auscultation - no rales, rhonchi or wheezes  BREAST: normal without masses, tenderness or nipple discharge and no palpable axillary masses or adenopathy  CV: regular rate and rhythm, normal S1 S2, no S3 or S4, no murmur, click or rub, no peripheral edema and peripheral pulses strong  ABDOMEN: soft, nontender, no hepatosplenomegaly, no masses and bowel sounds normal   (female): normal female external genitalia, normal urethral meatus, vaginal mucosa pink, moist, well rugated, and normal cervix/adnexa/uterus without masses or discharge  MS: no gross musculoskeletal defects noted, no edema  SKIN: urticarial rash in limbs, none on torso , back, neck up, palms or soles- that suddenly appeared in clinic today. She reports she had no rash in legs, prior to physical but it happens on and off.  NEURO: Normal strength and tone, mentation intact and speech normal  PSYCH: mentation appears normal, affect normal/bright    Diagnostic Test Results:  Labs reviewed in Epic    ASSESSMENT/PLAN:   1. 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 Urticaria  Assessment; 32 yo with random flare of urticarial rash. She reports no identifiable cause, lotion, exposure, foods.  Plan: for acute flare up short course of prednisone   - predniSONE (DELTASONE) 20 MG tablet; Take 20 mg by mouth daily.  Dispense: 5 tablet; Refill: 0  -advised to resume over the counter antihistamine   3. Chronic non-seasonal allergic rhinitis  Assessment: she denies history of asthma,  "personal or family history   She does use inhaler occassionally, would like a refill. Has stopped proton pump inhibitors and mast cell stabilizer, so not on any antihistamine currently   And  patient is requesting allergist referral.    Plan: - albuterol (PROAIR HFA/PROVENTIL HFA/VENTOLIN HFA) 108 (90 Base) MCG/ACT inhaler; Inhale 2 puffs into the lungs every 6 hours as needed for shortness of breath / dyspnea or wheezing  Dispense: 18 g; Refill: 11  - montelukast (SINGULAIR) 10 MG tablet; TAKE 1 TABLET BY MOUTH EVERYDAY AT BEDTIME  Dispense: 90 tablet; Refill: 3  - ALLERGY/ASTHMA ADULT REFERRAL  -advised to resume zyrtec or Loratadine, over the counter antihistamine once daily     4. Allergic conjunctivitis, bilateral  Patanol as needed      5. Bacterial vaginosis  Informed on mychart   - metroNIDAZOLE (FLAGYL) 500 MG tablet; Take 1 tablet (500 mg) by mouth 2 times daily for 7 days  Dispense: 14 tablet; Refill: 0    6. Family planning  Refill is given   - norgestim-eth estrad triphasic (TRI FEMYNOR) 0.18/0.215/0.25 MG-35 MCG tablet; Take 1 tablet by mouth daily  Dispense: 90 tablet; Refill: 3    7. Screen for STD (sexually transmitted disease)  - Chlamydia trachomatis PCR  - Neisseria gonorrhoeae PCR  - HIV Antigen Antibody Combo  - Treponema Abs w Reflex to RPR and Titer  - Wet prep- positive for bacterial vaginosis      8. Anxiety,  9. Moderate episode of recurrent major depressive disorder (H)  Assessment/plan; stable on fluoxetine 10 mg once daily- under care of [facility]    COUNSELING:  Reviewed preventive health counseling, as reflected in patient instructions       Regular exercise       Healthy diet/nutrition    Estimated body mass index is 25.26 kg/m  as calculated from the following:    Height as of this encounter: 1.6 m (5' 3\").    Weight as of this encounter: 64.7 kg (142 lb 9.6 oz).         reports that she has been smoking cigarettes.  She has never used smokeless tobacco.      Counseling Resources:  ATP " IV Guidelines  Pooled Cohorts Equation Calculator  Breast Cancer Risk Calculator  FRAX Risk Assessment  ICSI Preventive Guidelines  Dietary Guidelines for Americans, 2010  USDA's MyPlate  ASA Prophylaxis  Lung CA Screening    Vijaya Weiss MD  [facility]

## 2019-05-20 NOTE — LETTER
May 20, 2019      Lizet Condon  [address] [facility] [address]        To Whom It May Concern:    Lizet Condon  was seen on 5/20/2019.  Please excuse her from May 18th and 19th, 2019 due to personal reasons.        Sincerely,        Vijaya Weiss MD

## 2019-05-20 NOTE — RESULT ENCOUNTER NOTE
"Wet prep is positive for bacterial vaginosis. It is NOT sexually transmitted infection . It can be asymptomatic     Those with symptoms present with an unpleasant, \"fishy smelling\" discharge that is more noticeable after coitus.  If yo have symptoms, then  I do recommend treatment with metronidazole orally twice a day for a week, that's been sent to the pharmacy for you to .The medication  may give metallic taste in mouth, but over all well tolerated by most. Please call with questions or concern as needed.        "

## 2019-05-20 NOTE — LETTER
FOR:  [place]       RE: Lizet Condon  : 1985    05/20/19      Lizet  is seen today 19 at our clinic.   Please excuse from work    &           Sincerely,      Vijaya Weiss MD

## 2019-05-20 NOTE — NURSING NOTE
"Chief Complaint   Patient presents with     Physical     /72   Pulse 91   Temp 98.4  F (36.9  C) (Oral)   Resp 16   Ht 1.6 m (5' 3\")   Wt 64.7 kg (142 lb 9.6 oz)   LMP 05/18/2019 (Approximate)   SpO2 100%   BMI 25.26 kg/m   Estimated body mass index is 25.26 kg/m  as calculated from the following:    Height as of this encounter: 1.6 m (5' 3\").    Weight as of this encounter: 64.7 kg (142 lb 9.6 oz).  Medication Reconciliation: complete        Health Maintenance Due Pending Provider Review:  Pap Smear and PHQ9    Possibly completing today per provider review.    Bernie Lewis MA  [facility]  [phone]  "

## 2019-05-21 LAB — HIV 1+2 AB+HIV1 P24 AG SERPL QL IA: NONREACTIVE

## 2019-05-24 LAB — T PALLIDUM AB SER QL: NONREACTIVE

## 2019-09-03 ENCOUNTER — TELEPHONE (OUTPATIENT)
Dept: FAMILY MEDICINE | Facility: CLINIC | Age: 34
End: 2019-09-03

## 2019-09-03 NOTE — TELEPHONE ENCOUNTER
Pt is past due for f/u pap smear.  [facility] clinic and schedule.    Michaela Salguero  Pap Tracking

## 2019-09-28 ENCOUNTER — HEALTH MAINTENANCE LETTER (OUTPATIENT)
Age: 34
End: 2019-09-28

## 2019-10-16 NOTE — PROGRESS NOTES
"Subjective     Lizet Condon is a 34 year old female who presents to clinic today for the following health issues:    HPI   Patient is here today to follow up on colposcopy and to completed std/sti testing.  Lab Results   Component Value Date    PAP ASC-US 01/23/2019    PAP NIL 12/12/2017   Colposcopy-   PROBLEMS TO ADD ON...    BP Readings from Last 3 Encounters:   10/17/19 114/81   05/20/19 117/72   02/06/19 127/76    Wt Readings from Last 3 Encounters:   10/17/19 64.9 kg (143 lb)   05/20/19 64.7 kg (142 lb 9.6 oz)   02/06/19 62.6 kg (138 lb)                    Reviewed and updated as needed this visit by Provider         Review of Systems   ROS COMP: Constitutional, HEENT, cardiovascular, pulmonary, GI, , musculoskeletal, neuro, skin, endocrine and psych systems are negative, except as otherwise noted.      Objective    /81 (BP Location: Right arm, Patient Position: Sitting, Cuff Size: Adult Regular)   Pulse 98   Temp 98.6  F (37  C) (Oral)   Resp 14   Ht 1.6 m (5' 3\")   Wt 64.9 kg (143 lb)   LMP 10/03/2019 (Approximate)   SpO2 98%   Breastfeeding? No   BMI 25.33 kg/m    Body mass index is 25.33 kg/m .  Physical Exam   GENERAL: healthy, alert and no distress  NECK: no adenopathy, no asymmetry, masses, or scars and thyroid normal to palpation  RESP: lungs clear to auscultation - no rales, rhonchi or wheezes  CV: regular rate and rhythm, normal S1 S2, no S3 or S4, no murmur, click or rub, no peripheral edema and peripheral pulses strong  ABDOMEN: soft, nontender, no hepatosplenomegaly, no masses and bowel sounds normal   (female): normal female external genitalia, normal urethral meatus, vaginal mucosa, normal cervix/adnexa/uterus without masses or discharge  MS: no gross musculoskeletal defects noted, no edema    Diagnostic Test Results:  Labs reviewed in Epic        Assessment & Plan     1. Cervical high risk HPV (human papillomavirus) test positive  12/12/17 NIL pap, + HR HPV (not " "16/18). Plan 1 year cotest  01/07/19 Patient is lost to pap tracking follow-up.   01/23/19: [place] pap, + HR HPV types 18 and other result. Plan [place].   02/06/19: [place] Bx a minute amount of mucin and neutrophils, no tissue identified. ECC Neg for dysplasia. Plan Pap in 6 months per provider.   10/01/19   - Pap imaged thin layer diagnostic with HPV (select HPV order below)  - HPV High Risk Types DNA Cervical    2. Screen for STD (sexually transmitted disease)    - Wet prep  - Herpes Simplex Virus 1 and 2 IgG  - Treponema Abs w Reflex to RPR and Titer  - HIV Antigen Antibody Combo  - Chlamydia trachomatis PCR  - Neisseria gonorrhoeae PCR     Tobacco Cessation:   reports that she has been smoking cigarettes. She has never used smokeless tobacco.  Tobacco Cessation Action Plan: Self help information given to patient      BMI:   Estimated body mass index is 25.33 kg/m  as calculated from the following:    Height as of this encounter: 1.6 m (5' 3\").    Weight as of this encounter: 64.9 kg (143 lb).           Work on weight loss  Regular exercise    No follow-ups on file.    Vijaya Weiss MD  [facility]    "

## 2019-10-17 ENCOUNTER — RESULT FOLLOW UP (OUTPATIENT)
Dept: FAMILY MEDICINE | Facility: CLINIC | Age: 34
End: 2019-10-17

## 2019-10-17 ENCOUNTER — OFFICE VISIT (OUTPATIENT)
Dept: FAMILY MEDICINE | Facility: CLINIC | Age: 34
End: 2019-10-17
Payer: COMMERCIAL

## 2019-10-17 VITALS
BODY MASS INDEX: 25.34 KG/M2 | RESPIRATION RATE: 14 BRPM | WEIGHT: 143 LBS | HEIGHT: 63 IN | OXYGEN SATURATION: 98 % | SYSTOLIC BLOOD PRESSURE: 114 MMHG | DIASTOLIC BLOOD PRESSURE: 81 MMHG | TEMPERATURE: 98.6 F | HEART RATE: 98 BPM

## 2019-10-17 DIAGNOSIS — R87.810 CERVICAL HIGH RISK HPV (HUMAN PAPILLOMAVIRUS) TEST POSITIVE: ICD-10-CM

## 2019-10-17 DIAGNOSIS — Z11.3 SCREEN FOR STD (SEXUALLY TRANSMITTED DISEASE): ICD-10-CM

## 2019-10-17 DIAGNOSIS — R87.810 CERVICAL HIGH RISK HPV (HUMAN PAPILLOMAVIRUS) TEST POSITIVE: Primary | ICD-10-CM

## 2019-10-17 LAB
SPECIMEN SOURCE: NORMAL
WET PREP SPEC: NORMAL

## 2019-10-17 PROCEDURE — 87210 SMEAR WET MOUNT SALINE/INK: CPT | Performed by: FAMILY MEDICINE

## 2019-10-17 PROCEDURE — 88141 CYTOPATH C/V INTERPRET: CPT | Performed by: FAMILY MEDICINE

## 2019-10-17 PROCEDURE — 87491 CHLMYD TRACH DNA AMP PROBE: CPT | Performed by: FAMILY MEDICINE

## 2019-10-17 PROCEDURE — 99213 OFFICE O/P EST LOW 20 MIN: CPT | Performed by: FAMILY MEDICINE

## 2019-10-17 PROCEDURE — 86780 TREPONEMA PALLIDUM: CPT | Performed by: FAMILY MEDICINE

## 2019-10-17 PROCEDURE — 88175 CYTOPATH C/V AUTO FLUID REDO: CPT | Performed by: FAMILY MEDICINE

## 2019-10-17 PROCEDURE — 87389 HIV-1 AG W/HIV-1&-2 AB AG IA: CPT | Performed by: FAMILY MEDICINE

## 2019-10-17 PROCEDURE — 87624 HPV HI-RISK TYP POOLED RSLT: CPT | Performed by: FAMILY MEDICINE

## 2019-10-17 PROCEDURE — 87591 N.GONORRHOEAE DNA AMP PROB: CPT | Performed by: FAMILY MEDICINE

## 2019-10-17 PROCEDURE — 36415 COLL VENOUS BLD VENIPUNCTURE: CPT | Performed by: FAMILY MEDICINE

## 2019-10-17 PROCEDURE — 86696 HERPES SIMPLEX TYPE 2 TEST: CPT | Performed by: FAMILY MEDICINE

## 2019-10-17 PROCEDURE — 86695 HERPES SIMPLEX TYPE 1 TEST: CPT | Performed by: FAMILY MEDICINE

## 2019-10-17 ASSESSMENT — PAIN SCALES - GENERAL: PAINLEVEL: NO PAIN (0)

## 2019-10-17 ASSESSMENT — MIFFLIN-ST. JEOR: SCORE: 1317.77

## 2019-10-17 NOTE — LETTER
December 16, 2019    Lizet Condon  [address] [facility] [address]    Dear Ms.Johnson Condon,    We are contacting you by certified letter regarding your recent PAP smear result.     The result shows ASCUS or Atypical Squamous Cells of Undetermined Significance. This indicates a mild change only.    We also tested your sample for the presence of the HPV (Human Papillomavirus). Your sample was positive for HPV 16, 18 and Other. There are many types of HPV, but we test pap samples for the high risk types. HPV can be the cause of an abnormal Pap smear result.  The high risk types of HPV can also be related to the potential development of cervical cancer if not monitored and/or treated appropriately    Because of this, we need to do further testing. It is recommended that you schedule a colposcopy. Colposcopy is a way for your doctor to use a special magnifying device to look at your vulva, vagina, and cervix. If a problem is seen during colposcopy, a small sample of tissue may be collected for laboratory testing (biopsy). The exam and test will help determine the reason for your abnormal pap smear.    Please call [facility] at [phone] to schedule this procedure prior to 01/17/20. Schedule this for a time when you are not due to have a period (if having regular menstrual bleeding). You can take an over the counter pain reliever (either 600mg of Ibuprofen or 2 tablets Acetaminophen by mouth) 1 hour before your colposcopy, if no allergies or contraindications to these medications. Nothing in the vagina for 24 hours before your colposcopy (no sex, douches, vaginal medications or lubricants). No unprotected intercourse 2 weeks prior to this procedure. They will also check a urine pregnancy test prior to the Colposcopy.     If you have additional questions regarding this result, please call our registered nurse, Swati at [phone].    Sincerely,  Vijaya Weiss MD//[facility]

## 2019-10-17 NOTE — LETTER
November 12, 2019    Lizet Condon  [address] [facility] [address]    Dear Ms.Johnson Condon,    We are contacting you in writing because we have been unable to reach you by phone.     We have recently received your PAP smear result. The result shows ASCUS or Atypical Squamous Cells of Undetermined Significance. This indicates a mild change only    We also tested your sample for the presence of the HPV (Human Papillomavirus). Your sample was positive for HPV. There are many types of HPV, but we test pap samples for the high risk types. HPV can be the cause of an abnormal Pap smear result.  The high risk types of HPV can also be related to the potential development of cervical cancer if not monitored and/or treated appropriately    Because of this, we need to do further testing. It is recommended that you schedule a colposcopy. Colposcopy is a way for your doctor to use a special magnifying device to look at your vulva, vagina, and cervix. If a problem is seen during colposcopy, a small sample of tissue may be collected for laboratory testing (biopsy). The exam and test will help determine the reason for your abnormal pap smear.    Please call [facility] at [phone] to schedule this procedure prior to 01/17/20. Schedule this for a time when you are not due to have a period (if having regular menstrual bleeding). You can take an over the counter pain reliever (either 600mg of Ibuprofen or 2 tablets Acetaminophen by mouth) 1 hour before your colposcopy, if no allergies or contraindications to these medications. Nothing in the vagina for 24 hours before your colposcopy (no sex, douches, vaginal medications or lubricants). No unprotected intercourse 2 weeks prior to this procedure. They will also check a urine pregnancy test prior to the Colposcopy.     If you have additional questions regarding this result, please call our registered nurse, Swati at [phone].    Sincerely,  Vijaya  Veronique Weiss MD./Swati Valladares RN-Pap Tracking

## 2019-10-17 NOTE — NURSING NOTE
"Chief Complaint   Patient presents with     RECHECK     /81 (BP Location: Right arm, Patient Position: Sitting, Cuff Size: Adult Regular)   Pulse 98   Temp 98.6  F (37  C) (Oral)   Resp 14   Ht 1.6 m (5' 3\")   Wt 64.9 kg (143 lb)   LMP 10/03/2019 (Approximate)   SpO2 98%   Breastfeeding? No   BMI 25.33 kg/m   Estimated body mass index is 25.33 kg/m  as calculated from the following:    Height as of this encounter: 1.6 m (5' 3\").    Weight as of this encounter: 64.9 kg (143 lb).  bp completed using cuff size: regular      Health Maintenance addressed:  NONE    N/a  Joaquina Heredia CMA on 10/17/2019 at 12:19 PM                "

## 2019-10-17 NOTE — LETTER
April 3, 2020      Lizet Condon  [address] [facility] [address]    Dear Ms.Johnson Condon,      At [facility], your health and wellness is our primary concern. That is why we are following up on a abnormal Pap and a positive high risk HPV test from 10/17/19, which was reported as ASCUS Pap with + HR HPV 16, 18, and other. Your provider had recommended that you have a Colposcopy  completed by 01/17/20. Our records do not show that this has been done.    It is important to complete the follow up that your provider has suggested for you to ensure that there are no worsening changes which may, over time, develop into cancer.      To decrease the risk of potential exposure to COVID 19, it is recommended that you schedule your Colposcopy between *** and ***. Please call *** to schedule this procedure.  If you have questions or concerns, please call the clinic and we will be happy to assist you.    If you have completed the tests outside of [facility], please have the results forwarded to our office. We will update the chart for your primary Physician to review before your next annual physical.     Thank you for choosing [facility]!    Sincerely,      Your [facility] Care Team

## 2019-10-17 NOTE — LETTER
Karen 3, 2020      Lizet Daniel Condon  [address]  [facility] [address]      Dear MsJevon Daniel Condon,    At [facility], your health and wellness is our primary concern. That is why we are following up on an abnormal pap from 10/17/19, which was reported as ASCUS and positive for high-risk HPV 16, 18 and Other(s).  Your Provider had recommended that you have a Colposcopy completed by 01/17/20.     COVID-19 scheduling restrictions have been revised and we are now able to make this appointment at limited clinic sites:    [place]  [phone]    [place] [phone]   [facility] ([place]) [phone]   [place] [phone] ([facility] for [facility] clinic)   [place] [phone]   [place] [phone]   [place] [phone]     It is important to complete the follow up that your provider has suggested for you to ensure that there are no worsening changes which may, over time, develop into cancer.      Please call your preferred clinic from the list above to schedule an appointment for a Colposcopy (this cannot be scheduled through [facility]) at this time. If you have questions or concerns, please call the clinic and we will be happy to assist you.    If you have completed the tests outside of [facility], please have the results forwarded to our office. We will update the chart for your primary Physician to review before your next annual physical.     Thank you for choosing [facility]!    Sincerely,      Your [facility] Care Team//[facility]

## 2019-10-18 LAB
C TRACH DNA SPEC QL NAA+PROBE: NEGATIVE
HIV 1+2 AB+HIV1 P24 AG SERPL QL IA: NONREACTIVE
HSV1 IGG SERPL QL IA: 0.2 AI (ref 0–0.8)
HSV2 IGG SERPL QL IA: <0.2 AI (ref 0–0.8)
N GONORRHOEA DNA SPEC QL NAA+PROBE: NEGATIVE
SPECIMEN SOURCE: NORMAL
SPECIMEN SOURCE: NORMAL
T PALLIDUM AB SER QL: NONREACTIVE

## 2019-10-21 NOTE — RESULT ENCOUNTER NOTE
Negative sexually transmitted infection HIV, human immunodeficiency virus  and syphilis herpes, chlamydia & gonorrhea

## 2019-10-23 LAB
COPATH REPORT: ABNORMAL
PAP: ABNORMAL

## 2019-10-24 LAB
FINAL DIAGNOSIS: ABNORMAL
HPV HR 12 DNA CVX QL NAA+PROBE: POSITIVE
HPV16 DNA SPEC QL NAA+PROBE: POSITIVE
HPV18 DNA SPEC QL NAA+PROBE: POSITIVE
SPECIMEN DESCRIPTION: ABNORMAL
SPECIMEN SOURCE CVX/VAG CYTO: ABNORMAL

## 2019-10-25 NOTE — PROGRESS NOTES
12/12/17 NIL pap, + HR HPV (not 16/18). Plan 1 year cotest  01/07/19 Patient is lost to pap tracking follow-up.   01/23/19: Ascus pap, + HR HPV types 18 and other result. Plan [place].   02/06/19: [place] Bx a minute amount of mucin and neutrophils, no tissue identified. ECC Neg for dysplasia. Plan Pap in 6 months per provider.   10/17/19 ASCUS pap with + HR HPV 16, 18, and other. Plan [place] with Ob/Gyn per provider, due bef 01/17/20. ([facility]) (sk)  10/30/19: Msg left to call back. (sk)  11/4/19 left msg (cmc)  11/12/19: Tc to mail saved result and recommendation letter. I also released this to the pt via [employer]. (sk)  11/12/19 Result letter sent at request of RN. ([facility])  12/16/19 Certified letter sent: [phone] ([facility])  12/20/19 Per USPS tracking, certified letter delivered 12/18/19. Verification sent to [facility] for scanning into pts chart. ([facility])   01/17/20 3mo colp not done, chart and tracking updated for 6mo colp/pap. ([facility])  04/03/20 Due to COVID restrictions - Routed to RN to review recommendations . ([facility])    5/27/20 Resume reminder process, due to lifting of COVID - 19 scheduling restrictions. () 6 mo colp MyChart reminder sent. (drew)  06/03/20 MyChart not read, letter sent. Result Follow Up Encounter closed, now tracking in Cervical Cytology Tracker. ([facility])

## 2019-11-26 NOTE — TELEPHONE ENCOUNTER
"Prescription approved per [facility] Refill Protocol.  Cordelia Eller RN    Requested Prescriptions   Signed Prescriptions Disp Refills     montelukast (SINGULAIR) 10 MG tablet 30 tablet 1     Sig: TAKE 1 TABLET BY MOUTH EVERYDAY AT BEDTIME    Leukotriene Inhibitors Protocol Passed    11/19/2018  4:06 AM       Passed - Patient is age 12 or older    If patient is under 16, ok to refill using age based dosing.          Passed - Recent (12 mo) or future (30 days) visit within the authorizing provider's specialty    Patient had office visit in the last 12 months or has a visit in the next 30 days with authorizing provider or within the authorizing provider's specialty.  See \"Patient Info\" tab in inbasket, or \"Choose Columns\" in Meds & Orders section of the refill encounter.                " Pt's friend accompanying her frequently speaking for pt and interrupting writer when writer is at bedside explaining POC asking what writer is trying to explain. Expresses frustration earlier about lack of staff in room and updates.

## 2020-03-12 DIAGNOSIS — Z30.09 FAMILY PLANNING: ICD-10-CM

## 2020-03-13 RX ORDER — NORGESTIMATE AND ETHINYL ESTRADIOL 7DAYSX3 28
KIT ORAL
Start: 2020-03-13

## 2020-03-13 NOTE — TELEPHONE ENCOUNTER
"Too soon  Last Written Prescription Date:  5/20/2019  Last Fill Quantity: 90,  # refills: 3   Last office visit: 10/17/2019 with prescribing provider:  Yes, A.S   Future Office Visit:      Cordelia Eller RN    Requested Prescriptions   Refused Prescriptions Disp Refills     TRI FEMYNOR 0.18/0.215/0.25 MG-35 MCG tablet [Pharmacy Med Name: TRI FEMYNOR 28 TABLET]       Sig: TAKE 1 TABLET BY MOUTH EVERY DAY       Contraceptives Protocol Passed - 3/12/2020  7:31 AM        Passed - Patient is not a current smoker if age is 35 or older        Passed - Recent (12 mo) or future (30 days) visit within the authorizing provider's specialty     Patient has had an office visit with the authorizing provider or a provider within the authorizing providers department within the previous 12 mos or has a future within next 30 days. See \"Patient Info\" tab in inbasket, or \"Choose Columns\" in Meds & Orders section of the refill encounter.              Passed - Medication is active on med list        Passed - No active pregnancy on record        Passed - No positive pregnancy test in past 12 months           "

## 2020-04-03 ENCOUNTER — PATIENT OUTREACH (OUTPATIENT)
Dept: PEDIATRICS | Facility: CLINIC | Age: 35
End: 2020-04-03

## 2020-04-03 NOTE — TELEPHONE ENCOUNTER
Zuhair Weiss.  Please review and recommend appropriate follow up time frame, due to recent concerns with exposure to COVID 19. Interim guidelines, [email or web address].   Recommended [place] was not completed within the 3 month timeframe so was pushed out to 6 months.   We are now at the 6 month renetta but due to concerns surrounding Covid, scheduling may not be an option at this time.     Last abnormal pap was 10/17/19.     Please advise, when would you like the patient to schedule Colposcopy going forward.    Thank you,  Swati Valladares, RN-Pap Tracking     Pap Hx:  12/12/17 NIL pap, + HR HPV (not 16/18). Plan 1 year cotest  01/07/19 Patient is lost to pap tracking follow-up.   01/23/19: Ascus pap, + HR HPV types 18 and other result. Plan [place].   02/06/19: [place] Bx a minute amount of mucin and neutrophils, no tissue identified. ECC Neg for dysplasia. Plan Pap in 6 months per provider.   10/17/19 ASCUS pap with + HR HPV 16, 18, and other. Plan  [place] with Ob/Gyn per provider.  01/17/20 3mo colp not done, chart and tracking updated for 6mo colp/pap.

## 2020-04-14 DIAGNOSIS — Z30.09 FAMILY PLANNING: ICD-10-CM

## 2020-04-14 RX ORDER — NORGESTIMATE AND ETHINYL ESTRADIOL 7DAYSX3 28
KIT ORAL
Qty: 84 TABLET | Refills: 1 | Status: SHIPPED | OUTPATIENT
Start: 2020-04-14 | End: 2020-06-14

## 2020-04-14 NOTE — TELEPHONE ENCOUNTER
"Requested Prescriptions   Pending Prescriptions Disp Refills     TRI FEMYNOR 0.18/0.215/0.25 MG-35 MCG tablet [Pharmacy Med Name: TRI FEMYNOR 28 TABLET]  Last Written Prescription Date:  5/20/2019  Last Fill Quantity: 90 tablet,  # refills: 3   Last office visit: 10/17/2019 with prescribing provider:  Isela   Future Office Visit:     84 tablet 3     Sig: TAKE 1 TABLET BY MOUTH EVERY DAY       Contraceptives Protocol Passed - 4/14/2020 12:41 AM        Passed - Patient is not a current smoker if age is 35 or older        Passed - Recent (12 mo) or future (30 days) visit within the authorizing provider's specialty     Patient has had an office visit with the authorizing provider or a provider within the authorizing providers department within the previous 12 mos or has a future within next 30 days. See \"Patient Info\" tab in inbasket, or \"Choose Columns\" in Meds & Orders section of the refill encounter.              Passed - Medication is active on med list        Passed - No active pregnancy on record        Passed - No positive pregnancy test in past 12 months              "

## 2020-04-17 ENCOUNTER — MYC REFILL (OUTPATIENT)
Dept: FAMILY MEDICINE | Facility: CLINIC | Age: 35
End: 2020-04-17

## 2020-04-17 DIAGNOSIS — Z30.09 FAMILY PLANNING: ICD-10-CM

## 2020-04-17 RX ORDER — NORGESTIMATE AND ETHINYL ESTRADIOL 7DAYSX3 28
1 KIT ORAL DAILY
Qty: 84 TABLET | Refills: 1 | Status: CANCELLED | OUTPATIENT
Start: 2020-04-17

## 2020-05-22 ENCOUNTER — E-VISIT (OUTPATIENT)
Dept: FAMILY MEDICINE | Facility: CLINIC | Age: 35
End: 2020-05-22
Payer: COMMERCIAL

## 2020-05-22 DIAGNOSIS — R10.84 ABDOMINAL PAIN, GENERALIZED: Primary | ICD-10-CM

## 2020-05-22 PROCEDURE — 99207 ZZC NON-BILLABLE SERV PER CHARTING: CPT | Performed by: FAMILY MEDICINE

## 2020-05-27 ENCOUNTER — PATIENT OUTREACH (OUTPATIENT)
Dept: FAMILY MEDICINE | Facility: CLINIC | Age: 35
End: 2020-05-27

## 2020-05-27 NOTE — TELEPHONE ENCOUNTER
Resume follow-up process.   Of note: COVID 19 scheduling restrictions have been revised for selected sites.  She can now call to schedule her colposcopy at: [facility] @ [phone].    6 mo colp reminder message sent to pt.      Lizette Weaver RN  Pap Tracking

## 2020-07-01 ENCOUNTER — PATIENT OUTREACH (OUTPATIENT)
Dept: PEDIATRICS | Facility: CLINIC | Age: 35
End: 2020-07-01

## 2020-07-01 PROBLEM — R87.810 ASCUS WITH POSITIVE HIGH RISK HPV CERVICAL: Status: RESOLVED | Noted: 2019-02-06 | Resolved: 2020-07-01

## 2020-07-01 PROBLEM — R87.610 ASCUS WITH POSITIVE HIGH RISK HPV CERVICAL: Status: RESOLVED | Noted: 2019-02-06 | Resolved: 2020-07-01

## 2020-12-20 DIAGNOSIS — Z30.09 FAMILY PLANNING: ICD-10-CM

## 2020-12-23 RX ORDER — NORGESTIMATE AND ETHINYL ESTRADIOL 7DAYSX3 28
KIT ORAL
Qty: 28 TABLET | Refills: 0 | Status: SHIPPED | OUTPATIENT
Start: 2020-12-23 | End: 2021-01-18

## 2020-12-23 NOTE — TELEPHONE ENCOUNTER
OCP:    Medication is being filled for 1 time refill only due to:  Patient needs to be seen because it has been more than one year since last visit.   Due for physical.    Cordelia Eller RN

## 2021-01-10 ENCOUNTER — HEALTH MAINTENANCE LETTER (OUTPATIENT)
Age: 36
End: 2021-01-10

## 2021-01-18 ENCOUNTER — MYC REFILL (OUTPATIENT)
Dept: FAMILY MEDICINE | Facility: CLINIC | Age: 36
End: 2021-01-18

## 2021-01-18 DIAGNOSIS — Z30.09 FAMILY PLANNING: ICD-10-CM

## 2021-01-18 RX ORDER — NORGESTIMATE AND ETHINYL ESTRADIOL 7DAYSX3 28
1 KIT ORAL DAILY
Qty: 28 TABLET | Refills: 0 | Status: CANCELLED | OUTPATIENT
Start: 2021-01-18

## 2021-02-08 DIAGNOSIS — Z30.09 FAMILY PLANNING: ICD-10-CM

## 2021-02-11 RX ORDER — NORGESTIMATE AND ETHINYL ESTRADIOL 7DAYSX3 28
KIT ORAL
Qty: 28 TABLET | Refills: 0 | Status: SHIPPED | OUTPATIENT
Start: 2021-02-11 | End: 2021-03-10

## 2021-02-18 NOTE — TELEPHONE ENCOUNTER
AS,  Left VM #2 for patient  See below messages  No response from patient to our outreach  Please advise on further refill  Thanks,  Latosha EDWARD RN     Performing Laboratory: [id number] Expected Date Of Service: 02/18/2021 Bill For Surgical Tray: no Billing Type: Third-Party Bill

## 2021-03-03 DIAGNOSIS — Z30.09 FAMILY PLANNING: ICD-10-CM

## 2021-03-03 RX ORDER — NORGESTIMATE AND ETHINYL ESTRADIOL 7DAYSX3 28
KIT ORAL
Start: 2021-03-03

## 2021-03-10 ENCOUNTER — OFFICE VISIT (OUTPATIENT)
Dept: FAMILY MEDICINE | Facility: CLINIC | Age: 36
End: 2021-03-10
Payer: COMMERCIAL

## 2021-03-10 VITALS
BODY MASS INDEX: 28 KG/M2 | HEART RATE: 78 BPM | HEIGHT: 63 IN | TEMPERATURE: 97.5 F | WEIGHT: 158 LBS | RESPIRATION RATE: 16 BRPM | SYSTOLIC BLOOD PRESSURE: 118 MMHG | DIASTOLIC BLOOD PRESSURE: 84 MMHG | OXYGEN SATURATION: 99 %

## 2021-03-10 DIAGNOSIS — L84 CORN OR CALLUS: ICD-10-CM

## 2021-03-10 DIAGNOSIS — R87.810 CERVICAL HIGH RISK HPV (HUMAN PAPILLOMAVIRUS) TEST POSITIVE: ICD-10-CM

## 2021-03-10 DIAGNOSIS — Z00.00 ROUTINE GENERAL MEDICAL EXAMINATION AT A HEALTH CARE FACILITY: Primary | ICD-10-CM

## 2021-03-10 DIAGNOSIS — F33.1 MODERATE EPISODE OF RECURRENT MAJOR DEPRESSIVE DISORDER (H): ICD-10-CM

## 2021-03-10 DIAGNOSIS — F41.9 ANXIETY: ICD-10-CM

## 2021-03-10 DIAGNOSIS — Z30.09 FAMILY PLANNING: ICD-10-CM

## 2021-03-10 DIAGNOSIS — J30.89 CHRONIC NON-SEASONAL ALLERGIC RHINITIS: ICD-10-CM

## 2021-03-10 PROCEDURE — 99395 PREV VISIT EST AGE 18-39: CPT | Performed by: FAMILY MEDICINE

## 2021-03-10 PROCEDURE — 96127 BRIEF EMOTIONAL/BEHAV ASSMT: CPT | Performed by: FAMILY MEDICINE

## 2021-03-10 PROCEDURE — 87624 HPV HI-RISK TYP POOLED RSLT: CPT | Performed by: FAMILY MEDICINE

## 2021-03-10 RX ORDER — ALBUTEROL SULFATE 90 UG/1
2 AEROSOL, METERED RESPIRATORY (INHALATION) EVERY 6 HOURS PRN
Qty: 18 G | Refills: 11 | Status: CANCELLED | OUTPATIENT
Start: 2021-03-10

## 2021-03-10 RX ORDER — NORGESTIMATE AND ETHINYL ESTRADIOL 7DAYSX3 28
1 KIT ORAL DAILY
Qty: 90 TABLET | Refills: 3 | Status: SHIPPED | OUTPATIENT
Start: 2021-03-10 | End: 2022-02-01

## 2021-03-10 RX ORDER — FLUOXETINE 10 MG/1
40 CAPSULE ORAL EVERY MORNING
Qty: 90 CAPSULE | Refills: 3 | Status: CANCELLED | OUTPATIENT
Start: 2021-03-10

## 2021-03-10 RX ORDER — MONTELUKAST SODIUM 10 MG/1
TABLET ORAL
Qty: 90 TABLET | Refills: 3 | Status: CANCELLED | OUTPATIENT
Start: 2021-03-10

## 2021-03-10 ASSESSMENT — PATIENT HEALTH QUESTIONNAIRE - PHQ9
SUM OF ALL RESPONSES TO PHQ QUESTIONS 1-9: 3
SUM OF ALL RESPONSES TO PHQ QUESTIONS 1-9: 3
10. IF YOU CHECKED OFF ANY PROBLEMS, HOW DIFFICULT HAVE THESE PROBLEMS MADE IT FOR YOU TO DO YOUR WORK, TAKE CARE OF THINGS AT HOME, OR GET ALONG WITH OTHER PEOPLE: SOMEWHAT DIFFICULT

## 2021-03-10 ASSESSMENT — ANXIETY QUESTIONNAIRES
2. NOT BEING ABLE TO STOP OR CONTROL WORRYING: SEVERAL DAYS
4. TROUBLE RELAXING: SEVERAL DAYS
3. WORRYING TOO MUCH ABOUT DIFFERENT THINGS: SEVERAL DAYS
5. BEING SO RESTLESS THAT IT IS HARD TO SIT STILL: NOT AT ALL
7. FEELING AFRAID AS IF SOMETHING AWFUL MIGHT HAPPEN: SEVERAL DAYS
6. BECOMING EASILY ANNOYED OR IRRITABLE: SEVERAL DAYS
GAD7 TOTAL SCORE: 6
1. FEELING NERVOUS, ANXIOUS, OR ON EDGE: SEVERAL DAYS
GAD7 TOTAL SCORE: 6
7. FEELING AFRAID AS IF SOMETHING AWFUL MIGHT HAPPEN: SEVERAL DAYS
GAD7 TOTAL SCORE: 6

## 2021-03-10 ASSESSMENT — MIFFLIN-ST. JEOR: SCORE: 1376.84

## 2021-03-10 NOTE — PATIENT INSTRUCTIONS
Oral contraceptive pills increase the risk of deep venous thrombosis and blood clot.    Preventive Health Recommendations  Female Ages 26 - 39  Yearly exam:   See your health care provider every year in order to    Review health changes.     Discuss preventive care.      Review your medicines if you your doctor has prescribed any.    Until age 30: Get a Pap test every three years (more often if you have had an abnormal result).    After age 30: Talk to your doctor about whether you should have a Pap test every 3 years or have a Pap test with HPV screening every 5 years.   You do not need a Pap test if your uterus was removed (hysterectomy) and you have not had cancer.  You should be tested each year for STDs (sexually transmitted diseases), if you're at risk.   Talk to your provider about how often to have your cholesterol checked.  If you are at risk for diabetes, you should have a diabetes test (fasting glucose).  Shots: Get a flu shot each year. Get a tetanus shot every 10 years.   Nutrition:     Eat at least 5 servings of fruits and vegetables each day.    Eat whole-grain bread, whole-wheat pasta and brown rice instead of white grains and rice.    Get adequate Calcium and Vitamin D.     Lifestyle    Exercise at least 150 minutes a week (30 minutes a day, 5 days of the week). This will help you control your weight and prevent disease.    Limit alcohol to one drink per day.    No smoking.     Wear sunscreen to prevent skin cancer.    See your dentist every six months for an exam and cleaning.

## 2021-03-10 NOTE — PROGRESS NOTES
SUBJECTIVE:   CC: Lizet Condon is an 35 year old woman who presents for preventive health visit.       Patient has been advised of split billing requirements and indicates understanding: Yes  Healthy Habits:     Getting at least 3 servings of Calcium per day:  Yes    Bi-annual eye exam:  Yes    Dental care twice a year:  NO    Sleep apnea or symptoms of sleep apnea:  None    Diet:  Regular (no restrictions)    Frequency of exercise:  4-5 days/week    Duration of exercise:  30-45 minutes    Taking medications regularly:  Yes    Medication side effects:  None    PHQ-2 Total Score: 1    Additional concerns today:  Yes    1.ocp refill. No side effects or concerns. Quit smoking a while back  2.zyrtec works just as well- so she stopped montelucast & does not need it  3.Right heel pain due to wart has done otc meds but not helped   4. Chronic stable anxiety/ depression-  psychiatrist  @ [facility] and take Prozac 40 mg QD& therapist weekly  In new relationship- very happy.  Also getting COVID 19 vaccine soon, as front  local [facility]  Answers for HPI/ROS submitted by the patient on 3/10/2021   Annual Exam:  If you checked off any problems, how difficult have these problems made it for you to do your work, take care of things at home, or get along with other people?: Somewhat difficult  PHQ9 TOTAL SCORE: 3  GUERO 7 TOTAL SCORE: 6    Today's PHQ-2 Score:   PHQ-2 ( 1999 Pfizer) 3/10/2021   Q1: Little interest or pleasure in doing things 0   Q2: Feeling down, depressed or hopeless 1   PHQ-2 Score 1   Q1: Little interest or pleasure in doing things Not at all   Q2: Feeling down, depressed or hopeless Several days   PHQ-2 Score 1       Abuse: Current or Past (Physical, Sexual or Emotional) - No  Do you feel safe in your environment? Yes    Have you ever done Advance Care Planning? (For example, a Health Directive, POLST, or a discussion with a medical provider or your loved ones about your wishes):  No, advance care planning information given to patient to review.  Patient declined advance care planning discussion at this time.    Social History     Tobacco Use     Smoking status: Former Smoker     Packs/day: 0.25     Years: 10.00     Pack years: 2.50     Types: Cigarettes     Smokeless tobacco: Never Used     Tobacco comment: 1 cigarette a day   Substance Use Topics     Alcohol use: Yes     Alcohol/week: 0.0 standard drinks     Comment: beer or wine 3x week     If you drink alcohol do you typically have >3 drinks per day or >7 drinks per week? No    Alcohol Use 3/10/2021   Prescreen: >3 drinks/day or >7 drinks/week? No   Prescreen: >3 drinks/day or >7 drinks/week? -   No flowsheet data found.    Any new diagnosis of family breast, ovarian, or bowel cancer? No     Reviewed orders with patient.  Reviewed health maintenance and updated orders accordingly - Yes  BP Readings from Last 3 Encounters:   03/10/21 118/84   10/17/19 114/81   05/20/19 117/72    Wt Readings from Last 3 Encounters:   03/10/21 71.7 kg (158 lb)   10/17/19 64.9 kg (143 lb)   05/20/19 64.7 kg (142 lb 9.6 oz)                    Breast CA Risk Screening:not interested in mammogram this yr. Not intersted in genetic studies   Breast CA Risk Assessment (FHS-7) 3/10/2021   Did any of your first-degree relatives have breast or ovarian cancer? Yes   Did any of your relatives have bilateral breast cancer? No   Did any man in your family have breast cancer? No   Did any woman in your family have breast and ovarian cancer? No   Did any woman in your family have breast cancer before age 50 y? No   Do you have 2 or more relatives with breast and/or ovarian cancer? No   Do you have 2 or more relatives with breast and/or bowel cancer? No           Pertinent mammograms are reviewed under the imaging tab.    History of abnormal Pap smear: YES - updated in Problem List and Health Maintenance accordingly  PAP / HPV Latest Ref Rng & Units 3/10/2021 10/17/2019  "1/23/2019   PAP - NIL ASC-US(A) ASC-US(A)   HPV 16 DNA NEG:Negative Negative Positive(A) Negative   HPV 18 DNA NEG:Negative Negative Positive(A) Positive(A)   OTHER HR HPV NEG:Negative Positive(A) Positive(A) Positive(A)     Reviewed and updated as needed this visit by clinical staff  Tobacco  Allergies  Meds   Med Hx  Surg Hx  Fam Hx  Soc Hx        Reviewed and updated as needed this visit by Provider                    Review of Systems  CONSTITUTIONAL: NEGATIVE for fever, chills, change in weight  INTEGUMENTARU/SKIN: NEGATIVE for worrisome rashes, moles or lesions  EYES: NEGATIVE for vision changes or irritation  ENT: NEGATIVE for ear, mouth and throat problems  RESP: NEGATIVE for significant cough or SOB  BREAST: NEGATIVE for masses, tenderness or discharge  CV: NEGATIVE for chest pain, palpitations or peripheral edema  GI: NEGATIVE for nausea, abdominal pain, heartburn, or change in bowel habits  : NEGATIVE for unusual urinary or vaginal symptoms. Periods are regular.  MUSCULOSKELETAL: NEGATIVE for significant arthralgias or myalgia  NEURO: NEGATIVE for weakness, dizziness or paresthesias  PSYCHIATRIC: NEGATIVE for changes in mood or affect     OBJECTIVE:   /84   Pulse 78   Temp 97.5  F (36.4  C) (Skin)   Resp 16   Ht 1.594 m (5' 2.75\")   Wt 71.7 kg (158 lb)   SpO2 99%   BMI 28.21 kg/m    Physical Exam  GENERAL: healthy, alert and no distress  NECK: no adenopathy, no asymmetry, masses, or scars and thyroid normal to palpation  RESP: lungs clear to auscultation - no rales, rhonchi or wheezes  BREAST: normal without masses, tenderness or nipple discharge and no palpable axillary masses or adenopathy  CV: regular rate and rhythm, normal S1 S2, no S3 or S4, no murmur, click or rub, no peripheral edema and peripheral pulses strong  ABDOMEN: soft, nontender, no hepatosplenomegaly, no masses and bowel sounds normal.  Vagina and vulva are normal;  no discharge is noted.  Cervix normal without " lesions. Uterus anteverted and mobile, normal in size and shape without tenderness.  Adnexa normal in size without masses or tenderness. Pap Smear -is sent.  MS: no gross musculoskeletal defects noted, no edema  SKIN: multiple corns and callous no feet  Otherwise suspicious lesions or rashes  NEURO: Normal strength and tone, mentation intact and speech normal  PSYCH: mentation appears normal, affect normal/bright  PHQ 5/30/2018 1/23/2019 3/10/2021   PHQ-9 Total Score 23 10 3   Q9: Thoughts of better off dead/self-harm past 2 weeks Several days Not at all Not at all     GUERO-7 SCORE 5/30/2018 1/24/2019 3/10/2021   Total Score - - 6 (mild anxiety)   Total Score 21 9 6       Diagnostic Test Results:  Labs reviewed in Epic    ASSESSMENT/PLAN:   Lizet was seen today for physical.    Diagnoses and all orders for this visit:    Routine general medical examination at a health care facility    Chronic non-seasonal allergic rhinitis  Comments:  zyrtec works just as well- so she stopped montelucast & does not need it    Moderate episode of recurrent major depressive disorder (H)  Comments:  Under care therapist - weekly. @ [facility] and take Prozac 40 mg QD  Orders:  -     EMOTIONAL / BEHAVIORAL ASSESSMENT    Anxiety  Comments:  under care of Psychiatritst & Under care therapist - weekly. @ [facility] and take Prozac 40 mg QD  Orders:  -     EMOTIONAL / BEHAVIORAL ASSESSMENT    Family planning  -     norgestim-eth estrad triphasic (TRI FEMYNOR) 0.18/0.215/0.25 MG-35 MCG tablet; Take 1 tablet by mouth daily  Potential medication side effects were discussed with the patient; let me know if any occur.  Cervical high risk HPV (human papillomavirus) test positive  Comments:  pap completed today  Orders:  -     Pap imaged thin layer diagnostic with HPV (select HPV order below)  -     HPV High Risk Types DNA Cervical    12/12/17 NIL pap, + HR HPV (not 16/18). Plan 1 year cotest  01/07/19 Patient is lost to pap tracking  "follow-up.   01/23/19: ASCUS Pap, + HR HPV types 18 and other. Plan [place].   02/06/19: [place] Bx a minute amount of mucin and neutrophils, no tissue identified. ECC Neg for dysplasia. Plan Pap in 6 months per provider.   10/17/19 ASCUS pap with + HR HPV 16, 18, and other. Plan  [place] with Ob/Gyn per provider.  01/17/20 3mo colp not done, chart and tracking updated for 6mo colp/pap.   07/29/20 Patient is lost to pap tracking follow-up. FYI routed to provider.  03/10/21  Pap is sent, if positive for human pappiloma virus or abnormal pap- need colposcopy    Corn or callus  Comments:  symtommatic treatment- no wart        Patient has been advised of split billing requirements and indicates understanding: Yes  COUNSELING:  Reviewed preventive health counseling, as reflected in patient instructions       Regular exercise       Healthy diet/nutrition       Vision screening       Hearing screening       Family planning       Folic Acid    Estimated body mass index is 28.21 kg/m  as calculated from the following:    Height as of this encounter: 1.594 m (5' 2.75\").    Weight as of this encounter: 71.7 kg (158 lb).    Weight management plan: Discussed healthy diet and exercise guidelines    She reports that she has quit smoking. Her smoking use included cigarettes. She has a 2.50 pack-year smoking history. She has never used smokeless tobacco.  Tobacco Cessation Action Plan:   Information offered: Patient not interested at this time      Counseling Resources:  ATP IV Guidelines  Pooled Cohorts Equation Calculator  Breast Cancer Risk Calculator  BRCA-Related Cancer Risk Assessment: FHS-7 Tool  FRAX Risk Assessment  ICSI Preventive Guidelines  Dietary Guidelines for Americans, 2010  USDA's MyPlate  ASA Prophylaxis  Lung CA Screening    Vijaya Weiss MD  [facility] [place]  "

## 2021-03-11 ASSESSMENT — ANXIETY QUESTIONNAIRES: GAD7 TOTAL SCORE: 6

## 2021-03-15 LAB
COPATH REPORT: NORMAL
PAP: NORMAL

## 2021-03-17 ENCOUNTER — PATIENT OUTREACH (OUTPATIENT)
Dept: FAMILY MEDICINE | Facility: CLINIC | Age: 36
End: 2021-03-17

## 2021-03-17 LAB
FINAL DIAGNOSIS: ABNORMAL
HPV HR 12 DNA CVX QL NAA+PROBE: POSITIVE
HPV16 DNA SPEC QL NAA+PROBE: NEGATIVE
HPV18 DNA SPEC QL NAA+PROBE: NEGATIVE
SPECIMEN DESCRIPTION: ABNORMAL
SPECIMEN SOURCE CVX/VAG CYTO: ABNORMAL

## 2021-04-07 ENCOUNTER — OFFICE VISIT (OUTPATIENT)
Dept: FAMILY MEDICINE | Facility: CLINIC | Age: 36
End: 2021-04-07
Payer: COMMERCIAL

## 2021-04-07 VITALS
HEART RATE: 84 BPM | RESPIRATION RATE: 16 BRPM | BODY MASS INDEX: 27.55 KG/M2 | WEIGHT: 155.5 LBS | SYSTOLIC BLOOD PRESSURE: 118 MMHG | TEMPERATURE: 98.1 F | OXYGEN SATURATION: 98 % | HEIGHT: 63 IN | DIASTOLIC BLOOD PRESSURE: 77 MMHG

## 2021-04-07 DIAGNOSIS — R87.810 CERVICAL HIGH RISK HUMAN PAPILLOMAVIRUS (HPV) DNA TEST POSITIVE: Primary | ICD-10-CM

## 2021-04-07 LAB — HCG UR QL: NEGATIVE

## 2021-04-07 PROCEDURE — 99207 PR DROP WITH A PROCEDURE: CPT | Performed by: FAMILY MEDICINE

## 2021-04-07 PROCEDURE — 81025 URINE PREGNANCY TEST: CPT | Performed by: FAMILY MEDICINE

## 2021-04-07 PROCEDURE — 88305 TISSUE EXAM BY PATHOLOGIST: CPT | Performed by: PATHOLOGY

## 2021-04-07 PROCEDURE — 57454 BX/CURETT OF CERVIX W/SCOPE: CPT | Performed by: FAMILY MEDICINE

## 2021-04-07 ASSESSMENT — MIFFLIN-ST. JEOR: SCORE: 1365.5

## 2021-04-07 NOTE — PROGRESS NOTES
Lizet Condon is a 35 year old female who presents for initial colposcopy, referred by Dr Weiss. Pap smear 1 months ago showed: normal with high risk HPV . The prior pap showed ASCUS with high risk HPV      Past Medical History:   Diagnosis Date     Abnormal Pap smear of cervix 01/23/2019, 10/17/19    : See problem list.     Anxiety      Cervical high risk HPV (human papillomavirus) test positive 12/12/2017 12/12/2017, 01/23/19, 10/17/19, 03/10/21     Depression      Left wrist fracture      Seasonal allergies      Family History   Problem Relation Age of Onset     Family History Negative Mother        Previous history of abnormal paps?: Yes as above ASCUS with high risk HPV in 2019 and she had colp at that time   History of cryotherapy (freezing)?: : No  History of veneral diseases: : No  Do you desire testing for any of these diseases? : No  History of genital warts:  No  Visible warts now?:  No  Previously treated? If so, how?:  No     No LMP recorded.  Type of contraception: oral contraceptive  Age at first sexual intercourse: 17  Number of sexual partners (lifetime): >10   History   Smoking Status     Former Smoker     Packs/day: 0.25     Years: 10.00     Types: Cigarettes   Smokeless Tobacco     Never Used     Comment: 1 cigarette a day     History of sexual abuse:  No  Allergies as of 04/07/2021 - Reviewed 03/10/2021   Allergen Reaction Noted     Norco [hydrocodone-acetaminophen] Nausea 10/30/2017     Seasonal allergies  05/27/2018        PROCEDURE:  Before the procedure, it was ensured that the patient was educated regarding the nature of her findings to date, the implications of them, and what was to be done. She has been made aware of the role of HPV, the natural history of infection, ways to minimize her future risk, the effect of HPV on the cervix, and treatment options available should they be indicated. The   pathophysiology of the cervix, including a discussion of squamous vs. endometrial  cells, and squamous metaplasia have all been reviewed, using illustrations and sketches. The details of the colposcopic procedure were reviewed, as well as the risks of missed diagnoses, pain, infection and bleeding. All questions were answered before proceeding, and informed consent was therefore obtained.    Bimanual examination: was performed and was unremarkable.  Unenhanced examination of the cervix was normal without lesions.  Pap smear and endocervical sampling not obtained due to:    not due  Please refer to images section for details!  Pap repeated?:  No  SCJ seen?:  no  Endocervical speculum needed?:  Yes   ECC done?:  Yes   Lugol's solution used?:  Yes   Satisfactory examination?:  no    Vaginal vault: normal to cursory inspection   Urethra normal?:  yes  Labia normal?:  yes  Perineum normal?:  yes  Rectum normal?:  yes    FINDINGS:  Please see image   Cervix: acetowhitening noted 6 o'clock   Procedure: biopsies taken (not including ECC): 1.    Procedure summary: Patient tolerated procedure well     Assessment: HPV related changes      Plan: Specimens labelled and sent to pathology., Will base further treatment on pathology findings. and post biopsy instructions given to patient

## 2021-04-09 LAB — COPATH REPORT: NORMAL

## 2021-05-06 ENCOUNTER — PATIENT OUTREACH (OUTPATIENT)
Dept: FAMILY MEDICINE | Facility: CLINIC | Age: 36
End: 2021-05-06

## 2021-10-21 NOTE — PROGRESS NOTES
"  Assessment & Plan     Recurrent mild major depressive disorder with anxiety (H)  PLAN: REFILL GIVEN FOR 1 YR.  Has been doing best on current dose for yrs.    - EMOTIONAL / BEHAVIORAL ASSESSMENT  - FLUoxetine (PROZAC) 40 MG capsule; Take 1 capsule (40 mg) by mouth daily    Situational stress  WE TALKED ABOUT GI SYMPTOMS- AND SHE REPORTS ITS MOSTLY ANXIETY RELATED TO WOR- MORE LIKE BUTTERFLY TYPE OF SYMPTOMS AND WILL KEEP TALKING WITH THE THERAPIST    she denies suicidal thoughts or ideation.reports no side effects from medications. Would like to continue.    Booster for covid given as front   Flu vaccine also given      22 minutes spent on the date of the encounter doing chart review, history and exam, documentation and further activities per the note       BMI:   Estimated body mass index is 28.87 kg/m  as calculated from the following:    Height as of this encounter: 1.594 m (5' 2.76\").    Weight as of this encounter: 73.3 kg (161 lb 11.2 oz).           Return in about 6 months (around 4/25/2022) for routine physical.    Vijaya Weiss MD  [facility]    Mary Hinds is a 36 year old who presents for the following health issues     Anxiety         Depression and Anxiety Follow-Up    How are you doing with your depression since your last visit? Improved slightly     How are you doing with your anxiety since your last visit?  Improved slightly     Are you having other symptoms that might be associated with depression or anxiety? Yes:  upset stomach & no motivation to do stuff.    Have you had a significant life event? No     Do you have any concerns with your use of alcohol or other drugs? No   Likes to bike daily & that helps with mood as well   Works at local store, struggling with housing situation as well.  Also trying to get hours regulated- 35 hr/wk-   Feels anxious at work- and needed to get some time off work because of evolving situation between her partner " "divorce , trying to find a new house and current manager was being difficult. A lot of turn over at work of new young employee & its a frustrating situation for her at work.  She is looking for possibility of new job- and or going back to school- art therapy     Social History     Tobacco Use     Smoking status: Former Smoker     Packs/day: 0.25     Years: 10.00     Pack years: 2.50     Types: Cigarettes     Smokeless tobacco: Never Used     Tobacco comment: 1 cigarette a day   Substance Use Topics     Alcohol use: Yes     Alcohol/week: 0.0 standard drinks     Comment: beer or wine 3x week     Drug use: Yes     Types: Marijuana     Comment: 5x per month  sometimes less or more     PHQ 1/23/2019 3/10/2021 10/25/2021   PHQ-9 Total Score 10 3 3   Q9: Thoughts of better off dead/self-harm past 2 weeks Not at all Not at all Not at all     GUERO-7 SCORE 1/24/2019 3/10/2021 10/25/2021   Total Score - 6 (mild anxiety) 2 (minimal anxiety)   Total Score 9 6 2         The patient is interested in 3rd covid booster.   Will do flu shot     Review of Systems   Psychiatric/Behavioral: The patient is nervous/anxious.       Constitutional, HEENT, cardiovascular, pulmonary, GI, , musculoskeletal, neuro, skin, endocrine and psych systems are negative, except as otherwise noted.      Objective    /82   Pulse 85   Temp 97.5  F (36.4  C) (Temporal)   Ht 1.594 m (5' 2.76\")   Wt 73.3 kg (161 lb 11.2 oz)   SpO2 100%   BMI 28.87 kg/m    Body mass index is 28.87 kg/m .  Physical Exam   GENERAL: healthy, alert and no distress  NECK: no adenopathy, no asymmetry, masses, or scars and thyroid normal to palpation  RESP: lungs clear to auscultation - no rales, rhonchi or wheezes  CV: regular rate and rhythm, normal S1 S2, no S3 or S4, no murmur, click or rub, no peripheral edema and peripheral pulses strong  ABDOMEN: soft, nontender, no hepatosplenomegaly, no masses and bowel sounds normal  MS: no gross musculoskeletal defects noted, no " edema  PSYCH: mentation appears normal, affect normal/bright  UGERO-7 SCORE 1/24/2019 3/10/2021 10/25/2021   Total Score - 6 (mild anxiety) 2 (minimal anxiety)   Total Score 9 6 2     PHQ 1/23/2019 3/10/2021 10/25/2021   PHQ-9 Total Score 10 3 3   Q9: Thoughts of better off dead/self-harm past 2 weeks Not at all Not at all Not at all                 Answers for HPI/ROS submitted by the patient on 10/25/2021  If you checked off any problems, how difficult have these problems made it for you to do your work, take care of things at home, or get along with other people?: Somewhat difficult  PHQ9 TOTAL SCORE: 3  GUERO 7 TOTAL SCORE: 2

## 2021-10-23 ENCOUNTER — HEALTH MAINTENANCE LETTER (OUTPATIENT)
Age: 36
End: 2021-10-23

## 2021-10-25 ENCOUNTER — OFFICE VISIT (OUTPATIENT)
Dept: FAMILY MEDICINE | Facility: CLINIC | Age: 36
End: 2021-10-25
Payer: COMMERCIAL

## 2021-10-25 VITALS
HEIGHT: 63 IN | HEART RATE: 85 BPM | SYSTOLIC BLOOD PRESSURE: 132 MMHG | WEIGHT: 161.7 LBS | DIASTOLIC BLOOD PRESSURE: 82 MMHG | TEMPERATURE: 97.5 F | BODY MASS INDEX: 28.65 KG/M2 | OXYGEN SATURATION: 100 %

## 2021-10-25 DIAGNOSIS — F41.9 RECURRENT MILD MAJOR DEPRESSIVE DISORDER WITH ANXIETY (H): Primary | ICD-10-CM

## 2021-10-25 DIAGNOSIS — F33.0 RECURRENT MILD MAJOR DEPRESSIVE DISORDER WITH ANXIETY (H): Primary | ICD-10-CM

## 2021-10-25 DIAGNOSIS — F43.9 SITUATIONAL STRESS: ICD-10-CM

## 2021-10-25 PROBLEM — F33.1 MODERATE EPISODE OF RECURRENT MAJOR DEPRESSIVE DISORDER (H): Status: ACTIVE | Noted: 2017-12-12

## 2021-10-25 PROCEDURE — 90471 IMMUNIZATION ADMIN: CPT | Performed by: FAMILY MEDICINE

## 2021-10-25 PROCEDURE — 90686 IIV4 VACC NO PRSV 0.5 ML IM: CPT | Performed by: FAMILY MEDICINE

## 2021-10-25 PROCEDURE — 99214 OFFICE O/P EST MOD 30 MIN: CPT | Mod: 25 | Performed by: FAMILY MEDICINE

## 2021-10-25 PROCEDURE — 96127 BRIEF EMOTIONAL/BEHAV ASSMT: CPT | Performed by: FAMILY MEDICINE

## 2021-10-25 PROCEDURE — 91300 COVID-19,PF,PFIZER (12+ YRS): CPT | Performed by: FAMILY MEDICINE

## 2021-10-25 PROCEDURE — 0004A COVID-19,PF,PFIZER (12+ YRS): CPT | Performed by: FAMILY MEDICINE

## 2021-10-25 RX ORDER — FLUOXETINE 40 MG/1
40 CAPSULE ORAL DAILY
Qty: 90 CAPSULE | Refills: 3 | Status: SHIPPED | OUTPATIENT
Start: 2021-10-25 | End: 2022-10-19

## 2021-10-25 RX ORDER — FLUOXETINE 10 MG/1
40 CAPSULE ORAL EVERY MORNING
Qty: 90 CAPSULE | Refills: 3 | Status: CANCELLED | OUTPATIENT
Start: 2021-10-25

## 2021-10-25 ASSESSMENT — ANXIETY QUESTIONNAIRES
6. BECOMING EASILY ANNOYED OR IRRITABLE: NOT AT ALL
3. WORRYING TOO MUCH ABOUT DIFFERENT THINGS: SEVERAL DAYS
4. TROUBLE RELAXING: NOT AT ALL
2. NOT BEING ABLE TO STOP OR CONTROL WORRYING: NOT AT ALL
8. IF YOU CHECKED OFF ANY PROBLEMS, HOW DIFFICULT HAVE THESE MADE IT FOR YOU TO DO YOUR WORK, TAKE CARE OF THINGS AT HOME, OR GET ALONG WITH OTHER PEOPLE?: SOMEWHAT DIFFICULT
7. FEELING AFRAID AS IF SOMETHING AWFUL MIGHT HAPPEN: NOT AT ALL
7. FEELING AFRAID AS IF SOMETHING AWFUL MIGHT HAPPEN: NOT AT ALL
1. FEELING NERVOUS, ANXIOUS, OR ON EDGE: SEVERAL DAYS
GAD7 TOTAL SCORE: 2
5. BEING SO RESTLESS THAT IT IS HARD TO SIT STILL: NOT AT ALL

## 2021-10-25 ASSESSMENT — ENCOUNTER SYMPTOMS: NERVOUS/ANXIOUS: 1

## 2021-10-25 ASSESSMENT — MIFFLIN-ST. JEOR: SCORE: 1388.72

## 2021-10-26 ASSESSMENT — ANXIETY QUESTIONNAIRES: GAD7 TOTAL SCORE: 2

## 2022-02-17 PROBLEM — F43.9 SITUATIONAL STRESS: Status: ACTIVE | Noted: 2021-10-25

## 2022-03-22 ENCOUNTER — PATIENT OUTREACH (OUTPATIENT)
Dept: FAMILY MEDICINE | Facility: CLINIC | Age: 37
End: 2022-03-22
Payer: COMMERCIAL

## 2022-03-22 DIAGNOSIS — R87.810 CERVICAL HIGH RISK HUMAN PAPILLOMAVIRUS (HPV) DNA TEST POSITIVE: ICD-10-CM

## 2022-03-22 NOTE — LETTER
March 22, 2022      Lizet Condon  [address] [facility] [address]        Dear Ms.Johnson Condon,    This letter is to remind you that you are due for your follow-up Pap smear and Human Papillomavirus (HPV) test.    Please call [phone] to schedule your appointment at your earliest convenience.    If you have completed the appointment outside of the [facility] system, please have the records forwarded to our office. We will update your chart for your provider to review before your next annual wellness visit.     Thank you for choosing [facility]!      Sincerely,    Your [facility] Care Team
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
## 2022-06-03 ENCOUNTER — OFFICE VISIT (OUTPATIENT)
Dept: PODIATRY | Facility: CLINIC | Age: 37
End: 2022-06-03
Payer: COMMERCIAL

## 2022-06-03 VITALS — SYSTOLIC BLOOD PRESSURE: 122 MMHG | DIASTOLIC BLOOD PRESSURE: 68 MMHG

## 2022-06-03 DIAGNOSIS — B07.0 BILATERAL PLANTAR WART: Primary | ICD-10-CM

## 2022-06-03 PROCEDURE — 99203 OFFICE O/P NEW LOW 30 MIN: CPT | Performed by: PODIATRIST

## 2022-06-03 NOTE — PATIENT INSTRUCTIONS
PATIENT INSTRUCTIONS - Podiatry / Foot & Ankle Surgery    At home freezing      I can freeze but no more advanced Tx    For more advanced Treatment, call for Dermatology referral

## 2022-06-03 NOTE — PROGRESS NOTES
Assessment:      ICD-10-CM    1. Bilateral plantar wart  B07.0         Plan:  No orders of the defined types were placed in this encounter.      Discussed the etiology and treatment of the condition with the patient.    Recommend referral to Dermatology    Home Freeze - she already has    Return: Dermatology     Aurea Rodriguez DPM      Chief Complaint:     Patient presents with:  Foot Problems     wart    HPI:  Lizet Condon is a 36 year old year old female who presents for evaluation of wart.    Bottom of heel    Past Medical & Surgical History:  Past Medical History:   Diagnosis Date     Abnormal Pap smear of cervix 01/23/2019, 10/17/19    : See problem list.     Anxiety      Cervical high risk HPV (human papillomavirus) test positive 12/12/2017 12/12/2017, 01/23/19, 10/17/19, 03/10/21     Depression      Left wrist fracture      Seasonal allergies       Past Surgical History:   Procedure Laterality Date     OPEN REDUCTION INTERNAL FIXATION WRIST Left 10/31/2017    Procedure: OPEN REDUCTION INTERNAL FIXATION WRIST;  Open Reduction Internal Fixation Left Distal Radius Fracture;  Surgeon: Rico Berger MD;  Location: [facility] OR     wisdom teeth removed        Family History   Problem Relation Age of Onset     Family History Negative Mother         Social History:  ?  History   Smoking Status     Former Smoker     Packs/day: 0.25     Years: 10.00     Types: Cigarettes   Smokeless Tobacco     Never Used     Comment: 1 cigarette a day     History   Drug Use     Types: Marijuana     Comment: 5x per month  sometimes less or more     Social History    Substance and Sexual Activity      Alcohol use: Yes        Alcohol/week: 0.0 standard drinks        Comment: beer or wine 3x week      Allergies:  ?   Allergies   Allergen Reactions     Norco [Hydrocodone-Acetaminophen] Nausea     vicodin caused nausea     Seasonal Allergies         Medications:    Current Outpatient Medications   Medication      FLUoxetine (PROZAC) 40 MG capsule     fluticasone (FLONASE) 50 MCG/ACT spray     olopatadine (PATANOL) 0.1 % ophthalmic solution     TRI FEMYNOR 0.18/0.215/0.25 MG-35 MCG tablet     No current facility-administered medications for this visit.       Physical Exam:  ?  Vitals:  /68    General:  WD/WN, in NAD.  A&O x3.  Dermatologic:    Hyperkeratotic lesion(s) located plantar foot.  Petechia in base  present, loss of skin lines  present.   Skin texture, turgor is normal.  Vascular:  Pulses palpable bilateral.  Digital capillary refill time normal bilateral.  Skin temperature is normal bilateral.  Neurologic:    Gross sensation normal.  Gait and balance normal.  Musculoskeletal:  No pain to palpation of foot & ankle  aROM intact to foot & ankle  Muscle strength 5/5 foot & ankle

## 2022-06-03 NOTE — LETTER
6/3/2022         RE: Lizet Condon  [address]  [facility] [address]        Dear Colleague,    Thank you for referring your patient, Lizet Condon, to the [facility]. Please see a copy of my visit note below.    Assessment:      ICD-10-CM    1. Bilateral plantar wart  B07.0         Plan:  No orders of the defined types were placed in this encounter.      Discussed the etiology and treatment of the condition with the patient.    Recommend referral to Dermatology    Home Freeze - she already has    Return: Dermatology     Aurea Rodriguez DPM      Chief Complaint:     Patient presents with:  Foot Problems     wart    HPI:  Lizet Condon is a 36 year old year old female who presents for evaluation of wart.    Bottom of heel    Past Medical & Surgical History:  Past Medical History:   Diagnosis Date     Abnormal Pap smear of cervix 01/23/2019, 10/17/19    : See problem list.     Anxiety      Cervical high risk HPV (human papillomavirus) test positive 12/12/2017 12/12/2017, 01/23/19, 10/17/19, 03/10/21     Depression      Left wrist fracture      Seasonal allergies       Past Surgical History:   Procedure Laterality Date     OPEN REDUCTION INTERNAL FIXATION WRIST Left 10/31/2017    Procedure: OPEN REDUCTION INTERNAL FIXATION WRIST;  Open Reduction Internal Fixation Left Distal Radius Fracture;  Surgeon: Rico Berger MD;  Location: [facility] OR     wisdom teeth removed        Family History   Problem Relation Age of Onset     Family History Negative Mother         Social History:  ?  History   Smoking Status     Former Smoker     Packs/day: 0.25     Years: 10.00     Types: Cigarettes   Smokeless Tobacco     Never Used     Comment: 1 cigarette a day     History   Drug Use     Types: Marijuana     Comment: 5x per month  sometimes less or more     Social History    Substance and Sexual Activity      Alcohol use: Yes        Alcohol/week: 0.0 standard drinks         Comment: beer or wine 3x week      Allergies:  ?   Allergies   Allergen Reactions     Norco [Hydrocodone-Acetaminophen] Nausea     vicodin caused nausea     Seasonal Allergies         Medications:    Current Outpatient Medications   Medication     FLUoxetine (PROZAC) 40 MG capsule     fluticasone (FLONASE) 50 MCG/ACT spray     olopatadine (PATANOL) 0.1 % ophthalmic solution     TRI FEMYNOR 0.18/0.215/0.25 MG-35 MCG tablet     No current facility-administered medications for this visit.       Physical Exam:  ?  Vitals:  /68    General:  WD/WN, in NAD.  A&O x3.  Dermatologic:    Hyperkeratotic lesion(s) located plantar foot.  Petechia in base  present, loss of skin lines  present.   Skin texture, turgor is normal.  Vascular:  Pulses palpable bilateral.  Digital capillary refill time normal bilateral.  Skin temperature is normal bilateral.  Neurologic:    Gross sensation normal.  Gait and balance normal.  Musculoskeletal:  No pain to palpation of foot & ankle  aROM intact to foot & ankle  Muscle strength 5/5 foot & ankle                  Again, thank you for allowing me to participate in the care of your patient.        Sincerely,        Aurea Rodriguez DPM

## 2022-06-04 ENCOUNTER — HEALTH MAINTENANCE LETTER (OUTPATIENT)
Age: 37
End: 2022-06-04

## 2022-06-20 NOTE — TELEPHONE ENCOUNTER
Dr. Weiss,   Patient is lost to pap tracking follow-up. Attempts to contact pt have been made per reminder process and there has been no reply and/or no appt scheduled.       Pap Hx:  12/12/17 NIL pap, + HR HPV (not 16/18). Plan 1 year cotest  01/07/19 Patient is lost to pap tracking follow-up.   01/23/19: ASCUS Pap, + HR HPV types 18 and other. Plan [facility].   02/06/19: [facility] Bx a minute amount of mucin and neutrophils, no tissue identified. ECC Neg for dysplasia. Plan Pap in 6 months per provider.   10/17/19 ASCUS pap with + HR HPV 16, 18, and other. Plan  [facility] with Ob/Gyn per provider.  01/17/20 3mo colp not done, chart and tracking updated for 6mo colp/pap.   5/27/20 Resume reminder process, due to lifting of COVID - 19 scheduling restrictions. MyChart sent.   06/03/20 MyChart not read, letter sent. CCT  07/01/20 [facility] clinic and schedule.  07/29/20 Patient is lost to pap tracking follow-up. FYI routed to provider.  03/10/21 NIL Pap, + HR HPV (not 16 or 18). Plan [facility] due bef 06/10/21.   03/17/21 Msg left on voice mail to return call or advised patient to review results and recommendations that were released through [employer]. Pt viewed message.  03/31/21 [facility]-canceled  04/7/21 [facility] Bx and ECC Neg for dysplasia. Plan cotest in 1 year due 04/7/22.  3/22/22 Reminder letter   4/21/22 Reminder call -   5/23/22 Visit--canceled  06/20/22 Lost to follow-up for pap tracking, fyi routed to provider

## 2022-06-29 NOTE — TELEPHONE ENCOUNTER
Dear Triage,    I am unsure if they should be considered lost to follow up.i believe they dont check [employer] message for any reminder  Please call patient for pap reminder- document and close  Thanks

## 2022-07-05 DIAGNOSIS — Z30.09 FAMILY PLANNING: ICD-10-CM

## 2022-07-11 RX ORDER — NORGESTIMATE AND ETHINYL ESTRADIOL 7DAYSX3 28
KIT ORAL
Qty: 28 TABLET | Refills: 0 | Status: SHIPPED | OUTPATIENT
Start: 2022-07-11 | End: 2022-08-15

## 2022-07-11 NOTE — TELEPHONE ENCOUNTER
AS,  Left VM #2 for patient  See below messages  No response from patient to our outreach  Please advise on further refill  Thanks,  Latosha EDWARD RN

## 2022-08-15 DIAGNOSIS — Z30.09 FAMILY PLANNING: ICD-10-CM

## 2022-08-16 NOTE — TELEPHONE ENCOUNTER
One month supply sent 7/11/22  Due for physical.  Last 3/10/21    Cancelled physical for 5/23/22   Spontaneous, unlabored and symmetrical

## 2022-08-19 NOTE — TELEPHONE ENCOUNTER
AS,  Tried to call pt   Unable to leave VM  See below messages  No response from patient to our outreach  Please advise on further refill  Thanks,  Latosha EDWARD RN

## 2022-08-25 RX ORDER — NORGESTIMATE AND ETHINYL ESTRADIOL 7DAYSX3 28
1 KIT ORAL DAILY
Qty: 28 TABLET | Refills: 0 | Status: SHIPPED | OUTPATIENT
Start: 2022-08-25 | End: 2022-09-19

## 2022-09-19 DIAGNOSIS — Z30.09 FAMILY PLANNING: ICD-10-CM

## 2022-09-22 RX ORDER — NORGESTIMATE AND ETHINYL ESTRADIOL 7DAYSX3 28
KIT ORAL
Qty: 60 TABLET | Refills: 0 | Status: SHIPPED | OUTPATIENT
Start: 2022-09-22 | End: 2022-11-08

## 2022-10-09 ENCOUNTER — HEALTH MAINTENANCE LETTER (OUTPATIENT)
Age: 37
End: 2022-10-09

## 2022-10-19 DIAGNOSIS — F41.9 RECURRENT MILD MAJOR DEPRESSIVE DISORDER WITH ANXIETY (H): ICD-10-CM

## 2022-10-19 DIAGNOSIS — F33.0 RECURRENT MILD MAJOR DEPRESSIVE DISORDER WITH ANXIETY (H): ICD-10-CM

## 2022-10-19 RX ORDER — FLUOXETINE 40 MG/1
CAPSULE ORAL
Qty: 30 CAPSULE | Refills: 0 | Status: SHIPPED | OUTPATIENT
Start: 2022-10-19 | End: 2022-11-08

## 2022-10-19 NOTE — TELEPHONE ENCOUNTER
Medication is being filled for 1 time refill only due to:  Patient needs to be seen because it has been more than one year since last visit.     Last seen 10/25/21.  Due for physical .    Cordelia Eller RN

## 2022-11-08 ENCOUNTER — OFFICE VISIT (OUTPATIENT)
Dept: FAMILY MEDICINE | Facility: CLINIC | Age: 37
End: 2022-11-08
Payer: COMMERCIAL

## 2022-11-08 VITALS
HEART RATE: 100 BPM | SYSTOLIC BLOOD PRESSURE: 124 MMHG | TEMPERATURE: 99.8 F | OXYGEN SATURATION: 99 % | HEIGHT: 63 IN | WEIGHT: 169 LBS | DIASTOLIC BLOOD PRESSURE: 86 MMHG | BODY MASS INDEX: 29.95 KG/M2

## 2022-11-08 DIAGNOSIS — F33.0 RECURRENT MILD MAJOR DEPRESSIVE DISORDER WITH ANXIETY (H): ICD-10-CM

## 2022-11-08 DIAGNOSIS — F41.9 RECURRENT MILD MAJOR DEPRESSIVE DISORDER WITH ANXIETY (H): ICD-10-CM

## 2022-11-08 DIAGNOSIS — Z12.4 CERVICAL CANCER SCREENING: ICD-10-CM

## 2022-11-08 DIAGNOSIS — Z30.09 FAMILY PLANNING: ICD-10-CM

## 2022-11-08 DIAGNOSIS — Z00.00 ROUTINE GENERAL MEDICAL EXAMINATION AT A HEALTH CARE FACILITY: Primary | ICD-10-CM

## 2022-11-08 PROCEDURE — 96127 BRIEF EMOTIONAL/BEHAV ASSMT: CPT | Performed by: FAMILY MEDICINE

## 2022-11-08 PROCEDURE — 0124A COVID-19,PF,PFIZER BOOSTER BIVALENT: CPT | Performed by: FAMILY MEDICINE

## 2022-11-08 PROCEDURE — G0145 SCR C/V CYTO,THINLAYER,RESCR: HCPCS | Performed by: FAMILY MEDICINE

## 2022-11-08 PROCEDURE — 90715 TDAP VACCINE 7 YRS/> IM: CPT | Performed by: FAMILY MEDICINE

## 2022-11-08 PROCEDURE — 87624 HPV HI-RISK TYP POOLED RSLT: CPT | Performed by: FAMILY MEDICINE

## 2022-11-08 PROCEDURE — 91312 COVID-19,PF,PFIZER BOOSTER BIVALENT: CPT | Performed by: FAMILY MEDICINE

## 2022-11-08 PROCEDURE — 90686 IIV4 VACC NO PRSV 0.5 ML IM: CPT | Performed by: FAMILY MEDICINE

## 2022-11-08 PROCEDURE — 90472 IMMUNIZATION ADMIN EACH ADD: CPT | Performed by: FAMILY MEDICINE

## 2022-11-08 PROCEDURE — 90471 IMMUNIZATION ADMIN: CPT | Performed by: FAMILY MEDICINE

## 2022-11-08 PROCEDURE — 99395 PREV VISIT EST AGE 18-39: CPT | Mod: 25 | Performed by: FAMILY MEDICINE

## 2022-11-08 RX ORDER — NORGESTIMATE AND ETHINYL ESTRADIOL 7DAYSX3 28
1 KIT ORAL DAILY
Qty: 90 TABLET | Refills: 3 | Status: SHIPPED | OUTPATIENT
Start: 2022-11-08 | End: 2023-10-25

## 2022-11-08 RX ORDER — FLUOXETINE 40 MG/1
40 CAPSULE ORAL DAILY
Qty: 90 CAPSULE | Refills: 3 | Status: SHIPPED | OUTPATIENT
Start: 2022-11-08 | End: 2023-12-13

## 2022-11-08 ASSESSMENT — ENCOUNTER SYMPTOMS
DIZZINESS: 0
SHORTNESS OF BREATH: 0
CHILLS: 0
PARESTHESIAS: 0
ARTHRALGIAS: 0
FEVER: 0
HEMATURIA: 0
ABDOMINAL PAIN: 0
MYALGIAS: 0
NERVOUS/ANXIOUS: 0
FREQUENCY: 0
PALPITATIONS: 0
DYSURIA: 0
HEMATOCHEZIA: 0
HEADACHES: 0
DIARRHEA: 0
COUGH: 0
EYE PAIN: 0
BREAST MASS: 0
HEARTBURN: 0
JOINT SWELLING: 0
SORE THROAT: 0
WEAKNESS: 0
NAUSEA: 0
CONSTIPATION: 0

## 2022-11-08 ASSESSMENT — PATIENT HEALTH QUESTIONNAIRE - PHQ9
SUM OF ALL RESPONSES TO PHQ QUESTIONS 1-9: 2
10. IF YOU CHECKED OFF ANY PROBLEMS, HOW DIFFICULT HAVE THESE PROBLEMS MADE IT FOR YOU TO DO YOUR WORK, TAKE CARE OF THINGS AT HOME, OR GET ALONG WITH OTHER PEOPLE: NOT DIFFICULT AT ALL
SUM OF ALL RESPONSES TO PHQ QUESTIONS 1-9: 2

## 2022-11-08 NOTE — PROGRESS NOTES
SUBJECTIVE:   CC: Lizet is an 37 year old who presents for preventive health visit.       Patient has been advised of split billing requirements and indicates understanding: Yes  Healthy Habits:     Getting at least 3 servings of Calcium per day:  Yes    Bi-annual eye exam:  NO    Dental care twice a year:  NO    Sleep apnea or symptoms of sleep apnea:  None    Diet:  Regular (no restrictions)    Frequency of exercise:  2-3 days/week    Duration of exercise:  15-30 minutes    Taking medications regularly:  Yes    Medication side effects:  None    PHQ-2 Total Score: 1  Answers for HPI/ROS submitted by the patient on 11/8/2022  If you checked off any problems, how difficult have these problems made it for you to do your work, take care of things at home, or get along with other people?: Not difficult at all  PHQ9 TOTAL SCORE: 2  PHQ 3/10/2021 10/25/2021 11/8/2022   PHQ-9 Total Score 3 3 2   Q9: Thoughts of better off dead/self-harm past 2 weeks Not at all Not at all Not at all           Today's PHQ-2 Score:   PHQ-2 ( 1999 [employer]) 11/8/2022   Q1: Little interest or pleasure in doing things 0   Q2: Feeling down, depressed or hopeless 1   PHQ-2 Score 1   PHQ-2 Total Score (12-17 Years)- Positive if 3 or more points; Administer PHQ-A if positive -   Q1: Little interest or pleasure in doing things Not at all   Q2: Feeling down, depressed or hopeless Several days   PHQ-2 Score 1       Abuse: Current or Past (Physical, Sexual or Emotional) - No  Do you feel safe in your environment? Yes        Social History     Tobacco Use     Smoking status: Former     Packs/day: 0.25     Years: 10.00     Pack years: 2.50     Types: Cigarettes     Smokeless tobacco: Never     Tobacco comments:     1 cigarette a day   Substance Use Topics     Alcohol use: Yes     Alcohol/week: 0.0 standard drinks     Comment: beer or wine 3x week     If you drink alcohol do you typically have >3 drinks per day or >7 drinks per week? No    Alcohol Use  11/8/2022   Prescreen: >3 drinks/day or >7 drinks/week? No   Prescreen: >3 drinks/day or >7 drinks/week? -   No flowsheet data found.    Reviewed orders with patient.  Reviewed health maintenance and updated orders accordingly - Yes  BP Readings from Last 3 Encounters:   11/08/22 124/86   06/03/22 122/68   10/25/21 132/82    Wt Readings from Last 3 Encounters:   11/08/22 76.7 kg (169 lb)   10/25/21 73.3 kg (161 lb 11.2 oz)   04/07/21 70.5 kg (155 lb 8 oz)                    Breast Cancer Screening:  Any new diagnosis of family breast, ovarian, or bowel cancer? no    FHS-7:   Breast CA Risk Assessment (FHS-7) 3/10/2021 3/10/2021 11/8/2022   Did any of your first-degree relatives have breast or ovarian cancer? No Yes No   Did any of your relatives have bilateral breast cancer? No No No   Did any man in your family have breast cancer? No No No   Did any woman in your family have breast and ovarian cancer? No No No   Did any woman in your family have breast cancer before age 50 y? No No No   Do you have 2 or more relatives with breast and/or ovarian cancer? No No No   Do you have 2 or more relatives with breast and/or bowel cancer? No No No         Pertinent mammograms are reviewed under the imaging tab.    History of abnormal Pap smear: YES - JONH 2/3 on biopsy - PAP/HPV co-testing at 12, 24 months.  If two negative results repeat co-testing in 3 years, if negative then routine screening.  PAP / HPV Latest Ref Rng & Units 3/10/2021 10/17/2019 1/23/2019   PAP (Historical) - NIL ASC-US(A) ASC-US(A)   HPV16 NEG:Negative Negative Positive(A) Negative   HPV18 NEG:Negative Negative Positive(A) Positive(A)   HRHPV NEG:Negative Positive(A) Positive(A) Positive(A)     Reviewed and updated as needed this visit by clinical staff   Tobacco  Allergies  Meds  Problems  Med Hx  Surg Hx  Fam Hx          Reviewed and updated as needed this visit by Provider   Tobacco  Allergies  Meds  Problems  Med Hx  Surg Hx  Fam Hx       "       Review of Systems   Constitutional: Negative for chills and fever.   HENT: Negative for congestion, ear pain, hearing loss and sore throat.    Eyes: Negative for pain and visual disturbance.   Respiratory: Negative for cough and shortness of breath.    Cardiovascular: Negative for chest pain, palpitations and peripheral edema.   Gastrointestinal: Negative for abdominal pain, constipation, diarrhea, heartburn, hematochezia and nausea.   Breasts:  Negative for tenderness, breast mass and discharge.   Genitourinary: Negative for dysuria, frequency, genital sores, hematuria, pelvic pain, urgency, vaginal bleeding and vaginal discharge.   Musculoskeletal: Negative for arthralgias, joint swelling and myalgias.   Skin: Negative for rash.   Neurological: Negative for dizziness, weakness, headaches and paresthesias.   Psychiatric/Behavioral: Negative for mood changes. The patient is not nervous/anxious.           OBJECTIVE:   /86   Pulse 100   Temp 99.8  F (37.7  C) (Temporal)   Ht 1.603 m (5' 3.1\")   Wt 76.7 kg (169 lb)   SpO2 99%   BMI 29.84 kg/m    Physical Exam  GENERAL: healthy, alert and no distress  EYES: Eyes grossly normal to inspection, PERRL and conjunctivae and sclerae normal  HENT: ear canals and TM's normal, nose and mouth without ulcers or lesions  NECK: no adenopathy, no asymmetry, masses, or scars and thyroid normal to palpation  RESP: lungs clear to auscultation - no rales, rhonchi or wheezes  BREAST: normal without masses, tenderness or nipple discharge and no palpable axillary masses or adenopathy  CV: regular rate and rhythm, normal S1 S2, no S3 or S4, no murmur, click or rub, no peripheral edema and peripheral pulses strong  ABDOMEN: soft, nontender, no hepatosplenomegaly, no masses and bowel sounds normal   (female): normal female external genitalia, normal urethral meatus, vaginal mucosa pink, moist, well rugated, and normal cervix/adnexa/uterus without masses or discharge  MS: no " "gross musculoskeletal defects noted, no edema  SKIN: no suspicious lesions or rashes  NEURO: Normal strength and tone, mentation intact and speech normal  PSYCH: mentation appears normal, affect normal/bright    Diagnostic Test Results:  Labs reviewed in Epic  none     ASSESSMENT/PLAN:   Lizet was seen today for physical.    Diagnoses and all orders for this visit:    Routine general medical examination at a health care facility  -     REVIEW OF HEALTH MAINTENANCE PROTOCOL ORDERS  -     TDAP VACCINE (Adacel, Boostrix)  -     INFLUENZA VACCINE IM > 6 MONTHS VALENT IIV4 (AFLURIA/FLUZONE)  -     COVID-19,PF,PFIZER BOOSTER BIVALENT    Recurrent mild major depressive disorder with anxiety (H)  -     FLUoxetine (PROZAC) 40 MG capsule; Take 1 capsule (40 mg) by mouth daily  -     EMOTIONAL / BEHAVIORAL ASSESSMENT    Family planning  -     norgestim-eth estrad triphasic (ORTHO TRI-CYCLEN) 0.18/0.215/0.25 MG-35 MCG tablet; Take 1 tablet by mouth daily  Potential medication side effects were discussed with the patient; let me know if any occur.    Cervical cancer screening  -     Pap Screen with HPV - recommended age 30 - 65 years    History of abnormal Pap smear: YES - JONH 2/3 on biopsy - PAP/HPV co-testing at 12, 24 months.  If two negative results repeat co-testing in 3 years, if negative then routine screening.    Patient has been advised of split billing requirements and indicates understanding: Yes      COUNSELING:  Reviewed preventive health counseling, as reflected in patient instructions       Regular exercise       Healthy diet/nutrition       Vision screening       Hearing screening    Estimated body mass index is 29.84 kg/m  as calculated from the following:    Height as of this encounter: 1.603 m (5' 3.1\").    Weight as of this encounter: 76.7 kg (169 lb).    Weight management plan: Discussed healthy diet and exercise guidelines    She reports that she has quit smoking. Her smoking use included cigarettes. She " has a 2.50 pack-year smoking history. She has never used smokeless tobacco.      Counseling Resources:  ATP IV Guidelines  Pooled Cohorts Equation Calculator  Breast Cancer Risk Calculator  BRCA-Related Cancer Risk Assessment: FHS-7 Tool  FRAX Risk Assessment  ICSI Preventive Guidelines  Dietary Guidelines for Americans, 2010  USDA's MyPlate  ASA Prophylaxis  Lung CA Screening    Vijaya Weiss MD  [facility]

## 2022-11-10 LAB
BKR LAB AP GYN ADEQUACY: NORMAL
BKR LAB AP GYN INTERPRETATION: NORMAL
BKR LAB AP HPV REFLEX: NORMAL
BKR LAB AP PREVIOUS ABNL DX: NORMAL
BKR LAB AP PREVIOUS ABNORMAL: NORMAL
PATH REPORT.COMMENTS IMP SPEC: NORMAL
PATH REPORT.COMMENTS IMP SPEC: NORMAL
PATH REPORT.RELEVANT HX SPEC: NORMAL

## 2022-11-14 LAB
HUMAN PAPILLOMA VIRUS 16 DNA: NEGATIVE
HUMAN PAPILLOMA VIRUS 18 DNA: NEGATIVE
HUMAN PAPILLOMA VIRUS FINAL DIAGNOSIS: NORMAL
HUMAN PAPILLOMA VIRUS OTHER HR: NEGATIVE

## 2023-05-22 ASSESSMENT — PATIENT HEALTH QUESTIONNAIRE - PHQ9
10. IF YOU CHECKED OFF ANY PROBLEMS, HOW DIFFICULT HAVE THESE PROBLEMS MADE IT FOR YOU TO DO YOUR WORK, TAKE CARE OF THINGS AT HOME, OR GET ALONG WITH OTHER PEOPLE: SOMEWHAT DIFFICULT
SUM OF ALL RESPONSES TO PHQ QUESTIONS 1-9: 3
SUM OF ALL RESPONSES TO PHQ QUESTIONS 1-9: 3

## 2023-05-23 ENCOUNTER — OFFICE VISIT (OUTPATIENT)
Dept: FAMILY MEDICINE | Facility: CLINIC | Age: 38
End: 2023-05-23
Payer: COMMERCIAL

## 2023-05-23 VITALS
OXYGEN SATURATION: 99 % | RESPIRATION RATE: 18 BRPM | BODY MASS INDEX: 29.31 KG/M2 | SYSTOLIC BLOOD PRESSURE: 120 MMHG | HEIGHT: 64 IN | WEIGHT: 171.7 LBS | TEMPERATURE: 98 F | HEART RATE: 87 BPM | DIASTOLIC BLOOD PRESSURE: 80 MMHG

## 2023-05-23 DIAGNOSIS — M54.50 ACUTE BILATERAL LOW BACK PAIN WITHOUT SCIATICA: ICD-10-CM

## 2023-05-23 DIAGNOSIS — M54.6 ACUTE BILATERAL THORACIC BACK PAIN: Primary | ICD-10-CM

## 2023-05-23 LAB
ALBUMIN UR-MCNC: NEGATIVE MG/DL
ANION GAP SERPL CALCULATED.3IONS-SCNC: 13 MMOL/L (ref 7–15)
APPEARANCE UR: CLEAR
BACTERIA #/AREA URNS HPF: ABNORMAL /HPF
BILIRUB UR QL STRIP: NEGATIVE
BUN SERPL-MCNC: 9.3 MG/DL (ref 6–20)
CALCIUM SERPL-MCNC: 9.2 MG/DL (ref 8.6–10)
CHLORIDE SERPL-SCNC: 102 MMOL/L (ref 98–107)
COLOR UR AUTO: YELLOW
CREAT SERPL-MCNC: 0.77 MG/DL (ref 0.51–0.95)
DEPRECATED HCO3 PLAS-SCNC: 22 MMOL/L (ref 22–29)
GFR SERPL CREATININE-BSD FRML MDRD: >90 ML/MIN/1.73M2
GLUCOSE SERPL-MCNC: 98 MG/DL (ref 70–99)
GLUCOSE UR STRIP-MCNC: NEGATIVE MG/DL
HGB UR QL STRIP: NEGATIVE
KETONES UR STRIP-MCNC: NEGATIVE MG/DL
LEUKOCYTE ESTERASE UR QL STRIP: NEGATIVE
NITRATE UR QL: NEGATIVE
PH UR STRIP: 6.5 [PH] (ref 5–7)
POTASSIUM SERPL-SCNC: 4.7 MMOL/L (ref 3.4–5.3)
RBC #/AREA URNS AUTO: ABNORMAL /HPF
SODIUM SERPL-SCNC: 137 MMOL/L (ref 136–145)
SP GR UR STRIP: 1.02 (ref 1–1.03)
SQUAMOUS #/AREA URNS AUTO: ABNORMAL /LPF
UROBILINOGEN UR STRIP-ACNC: 0.2 E.U./DL
WBC #/AREA URNS AUTO: ABNORMAL /HPF

## 2023-05-23 PROCEDURE — 81001 URINALYSIS AUTO W/SCOPE: CPT | Performed by: FAMILY MEDICINE

## 2023-05-23 PROCEDURE — 80048 BASIC METABOLIC PNL TOTAL CA: CPT | Performed by: FAMILY MEDICINE

## 2023-05-23 PROCEDURE — 99214 OFFICE O/P EST MOD 30 MIN: CPT | Performed by: FAMILY MEDICINE

## 2023-05-23 PROCEDURE — 36415 COLL VENOUS BLD VENIPUNCTURE: CPT | Performed by: FAMILY MEDICINE

## 2023-05-23 RX ORDER — CYCLOBENZAPRINE HCL 5 MG
5 TABLET ORAL AT BEDTIME
Qty: 30 TABLET | Refills: 0 | Status: SHIPPED | OUTPATIENT
Start: 2023-05-23 | End: 2023-07-24

## 2023-05-23 ASSESSMENT — PATIENT HEALTH QUESTIONNAIRE - PHQ9
SUM OF ALL RESPONSES TO PHQ QUESTIONS 1-9: 3
10. IF YOU CHECKED OFF ANY PROBLEMS, HOW DIFFICULT HAVE THESE PROBLEMS MADE IT FOR YOU TO DO YOUR WORK, TAKE CARE OF THINGS AT HOME, OR GET ALONG WITH OTHER PEOPLE: SOMEWHAT DIFFICULT

## 2023-05-23 ASSESSMENT — PAIN SCALES - GENERAL: PAINLEVEL: EXTREME PAIN (8)

## 2023-05-23 NOTE — PROGRESS NOTES
"  Assessment & Plan     Acute bilateral thoracic back pain  We discussed muscle relaxer at hs and NSAIDs during the day , start PT , I have placed the referral , will check labs for kidney function   Also, topical Voltaren gel   If all the above do not provide relief , would do the MRI   Also rest and no lifting , no twisting , no bending down   - cyclobenzaprine (FLEXERIL) 5 MG tablet; Take 1 tablet (5 mg) by mouth At Bedtime  - Physical Therapy Referral; Future  - MR Thoracic Spine w/o Contrast; Future  - Basic metabolic panel  (Ca, Cl, CO2, Creat, Gluc, K, Na, BUN); Future  - diclofenac (VOLTAREN) 1 % topical gel; Apply 2 g topically 4 times daily  - UA with Microscopic reflex to Culture - lab collect; Future  - Basic metabolic panel  (Ca, Cl, CO2, Creat, Gluc, K, Na, BUN)  - UA with Microscopic reflex to Culture - lab collect  - UA Microscopic with Reflex to Culture    Acute bilateral low back pain without sciatica  As above   - MR Thoracic Spine w/o Contrast; Future  - diclofenac (VOLTAREN) 1 % topical gel; Apply 2 g topically 4 times daily             BMI:   Estimated body mass index is 29.47 kg/m  as calculated from the following:    Height as of this encounter: 1.626 m (5' 4\").    Weight as of this encounter: 77.9 kg (171 lb 11.2 oz).   RTC if no improving or worsening.  Pt is aware  and comfortable with the current plan.      Vinita Valladares MD  [facility]    Mary Hinds is a 37 year old, presenting for the following health issues:  Back Pain        5/23/2023     9:15 AM   Additional Questions   Roomed by Kathleen DO     History of Present Illness       Back Pain:  She presents for follow up of back pain. Patient's back pain is a new problem.    Original cause of back pain: lifting  First noticed back pain: 1-4 weeks ago  Patient feels back pain: constantlyLocation of back pain:  Left lower back  Description of back pain: dull ache, fullness, sharp, shooting and stabbing  Back pain " spreads: nowhere    Since patient first noticed back pain, pain is: rapidly worsening  Does back pain interfere with her job:  Yes  On a scale of 1-10 (10 being the worst), patient describes pain as:  9  What makes back pain worse: bending, coughing, certain positions, lying down, sitting, standing, stress and twisting  Acupuncture: not tried  Acetaminophen: not helpful  Activity or exercise: not helpful  Chiropractor:  Not tried  Cold: not helpful  Heat: not helpful  Massage: not helpful  Muscle relaxants: not tried  NSAIDS: not helpful  Opioids: not tried  Physical Therapy: not tried  Rest: not helpful  Steroid Injection: not tried  Stretching: not helpful  Surgery: not tried  TENS unit: not tried  Topical pain relievers: not tried  Other healthcare providers patient is seeing for back pain: None    She eats 2-3 servings of fruits and vegetables daily.She consumes 1 sweetened beverage(s) daily.She exercises with enough effort to increase her heart rate 30 to 60 minutes per day.  She exercises with enough effort to increase her heart rate 5 days per week.   She is taking medications regularly.    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      Objective    There were no vitals taken for this visit.  There is no height or weight on file to calculate BMI.  Physical Exam   GENERAL: healthy, alert and no distress  EYES: Eyes grossly normal to inspection, PERRL and conjunctivae and sclerae normal  NECK: no adenopathy, no asymmetry, masses, or scars and thyroid normal to palpation  RESP: lungs clear to auscultation - no rales, rhonchi or wheezes  CV: regular rate and rhythm, normal S1 S2, no S3 or S4, no murmur, click or rub, no peripheral edema and peripheral pulses strong  ABDOMEN: soft, nontender, no hepatosplenomegaly, no masses and bowel sounds normal  MS: no gross musculoskeletal defects noted, no edema EXCEPT there is spasms  in the paraspinal muscles in the thoracic and  lumbar area , reflexes are equal and good ricardo     Results for orders placed or performed in visit on 05/23/23   Basic metabolic panel  (Ca, Cl, CO2, Creat, Gluc, K, Na, BUN)     Status: Normal   Result Value Ref Range    Sodium 137 136 - 145 mmol/L    Potassium 4.7 3.4 - 5.3 mmol/L    Chloride 102 98 - 107 mmol/L    Carbon Dioxide (CO2) 22 22 - 29 mmol/L    Anion Gap 13 7 - 15 mmol/L    Urea Nitrogen 9.3 6.0 - 20.0 mg/dL    Creatinine 0.77 0.51 - 0.95 mg/dL    Calcium 9.2 8.6 - 10.0 mg/dL    Glucose 98 70 - 99 mg/dL    GFR Estimate >90 >60 mL/min/1.73m2   UA with Microscopic reflex to Culture - lab collect     Status: Normal    Specimen: Urine, Midstream   Result Value Ref Range    Color Urine Yellow Colorless, Straw, Light Yellow, Yellow    Appearance Urine Clear Clear    Glucose Urine Negative Negative mg/dL    Bilirubin Urine Negative Negative    Ketones Urine Negative Negative mg/dL    Specific Gravity Urine 1.020 1.003 - 1.035    Blood Urine Negative Negative    pH Urine 6.5 5.0 - 7.0    Protein Albumin Urine Negative  Negative mg/dL    Urobilinogen Urine 0.2 0.2, 1.0 E.U./dL    Nitrite Urine Negative Negative    Leukocyte Esterase Urine Negative Negative   UA Microscopic with Reflex to Culture     Status: Abnormal   Result Value Ref Range    Bacteria Urine None Seen None Seen /HPF    RBC Urine 0-2 0-2 /HPF /HPF    WBC Urine None Seen 0-5 /HPF /HPF    Squamous Epithelials Urine Few (A) None Seen /LPF    Narrative    Urine Culture not indicated

## 2023-05-26 ENCOUNTER — THERAPY VISIT (OUTPATIENT)
Dept: PHYSICAL THERAPY | Facility: CLINIC | Age: 38
End: 2023-05-26
Attending: FAMILY MEDICINE
Payer: COMMERCIAL

## 2023-05-26 DIAGNOSIS — M54.6 BILATERAL THORACIC BACK PAIN: ICD-10-CM

## 2023-05-26 DIAGNOSIS — M54.6 ACUTE BILATERAL THORACIC BACK PAIN: ICD-10-CM

## 2023-05-26 PROCEDURE — 97161 PT EVAL LOW COMPLEX 20 MIN: CPT | Mod: GP | Performed by: PHYSICAL THERAPIST

## 2023-05-26 PROCEDURE — 97110 THERAPEUTIC EXERCISES: CPT | Mod: GP | Performed by: PHYSICAL THERAPIST

## 2023-05-26 PROCEDURE — 97140 MANUAL THERAPY 1/> REGIONS: CPT | Mod: GP | Performed by: PHYSICAL THERAPIST

## 2023-05-26 NOTE — PROGRESS NOTES
PHYSICAL THERAPY EVALUATION  Type of Visit: Evaluation    See electronic medical record for Abuse and Falls Screening details.    Subjective      Presenting condition or subjective complaint: acute back pain  Date of onset:      Relevant medical history: Depression; Mental Illness   Dates & types of surgery: wisdom theeth out ,  surgery on left wrist for fracture    Prior diagnostic imaging/testing results:       Prior therapy history for the same diagnosis, illness or injury: No      Prior Level of Function   Transfers: Independent  Ambulation: Independent  ADL: Independent  IADL:     Living Environment  Social support: With a significant other or spouse   Type of home: House; 2-story   Stairs to enter the home: Yes 4 Is there a railing: No   Ramp: No   Stairs inside the home: Yes 12     Help at home: Home and Yard maintenance tasks; Home management tasks (cooking, cleaning)  Equipment owned:       Employment: Yes  [employer]/  Hobbies/Interests: biking, walking, hiking, gardening, crafting/srting, cooking reading, video games    Patient goals for therapy: lift heavy thing, ride bicycle to work    Pain assessment: Pain present  Location: left lower thoracic/Ratin/10     Objective   LUMBAR SPINE EVALUATION  PAIN: Pain Location: thoracic spine and lumbar spine  Pain Quality: Aching, Dull and Exhausting  Pain is Exacerbated By: twisting and bending  INTEGUMENTARY (edema, incisions): WNL  POSTURE: Standing Posture: Rounded shoulders, Forward head  GAIT:   Weightbearing Status: WBAT  Assistive Device(s): None  Gait Deviations: WNL  Balance/Proprioception: 10 sec  Weight Bearing Alignment: WNL  Non-Weight Bearing Alignment: WNL   ROM:   (Degrees) Left AROM Left PROM  Right AROM Right PROM   Hip Flexion       Hip Extension       Hip Abduction       Hip Adduction       Hip Internal Rotation       Hip External Rotation       Knee Flexion       Knee Extension       Lumbar Side glide     Lumbar  Flexion Hands to shin   Lumbar extension Pain left lower thoracic, upper lumbar   Pain:   End feel:     PELVIC/SI SCREEN: na  Strength:     MYOTOMES: WNL  DTR S: NA  CORD SIGNS: NA  DERMATOMES: WNL  NEURAL TENSION: Lumbar WNL  FLEXIBILITY: Decreased hip flexors L, Decreased hamstrings L, Decreased hip flexors R, Decreased hamstrings R  LUMBAR/HIP Special Tests: nA   FUNCTIONAL TESTS: na  PALPATION: pain and increased tone, left rotation noted at T12  SPINAL SEGMENTAL CONCLUSIONS: left rotation at T12            Assessment & Plan   CLINICAL IMPRESSIONS   Medical Diagnosis: thoracic pain    Treatment Diagnosis: thoracic pain   Impression/Assessment: Patient is a 37 year old female with thoracic complaints.  The following significant findings have been identified: Pain, Decreased ROM/flexibility, Decreased joint mobility, Decreased strength and Decreased activity tolerance. These impairments interfere with their ability to perform self care tasks, work tasks, recreational activities, household chores, driving , household mobility and community mobility as compared to previous level of function.     Clinical Decision Making (Complexity):   Clinical Presentation: Stable/Uncomplicated  Clinical Presentation Rationale: based on medical and personal factors listed in PT evaluation  Clinical Decision Making (Complexity): Low complexity    PLAN OF CARE  Treatment Interventions:  Interventions: Manual Therapy, Therapeutic Activity, Therapeutic Exercise    Long Term Goals            Frequency of Treatment:    Duration of Treatment:      Education Assessment:        Risks and benefits of evaluation/treatment have been explained.   Patient/Family/caregiver agrees with Plan of Care.     Evaluation Time:         Present: Not applicable    Signing Clinician: Rachele Alvarez, PT

## 2023-06-01 ENCOUNTER — THERAPY VISIT (OUTPATIENT)
Dept: PHYSICAL THERAPY | Facility: CLINIC | Age: 38
End: 2023-06-01
Payer: COMMERCIAL

## 2023-06-01 DIAGNOSIS — M54.6 BILATERAL THORACIC BACK PAIN: Primary | ICD-10-CM

## 2023-06-01 PROCEDURE — 97140 MANUAL THERAPY 1/> REGIONS: CPT | Mod: GP | Performed by: PHYSICAL THERAPIST

## 2023-06-01 PROCEDURE — 97110 THERAPEUTIC EXERCISES: CPT | Mod: GP | Performed by: PHYSICAL THERAPIST

## 2023-06-27 ENCOUNTER — THERAPY VISIT (OUTPATIENT)
Dept: PHYSICAL THERAPY | Facility: CLINIC | Age: 38
End: 2023-06-27
Payer: COMMERCIAL

## 2023-06-27 DIAGNOSIS — M54.6 BILATERAL THORACIC BACK PAIN: Primary | ICD-10-CM

## 2023-06-27 PROCEDURE — 97140 MANUAL THERAPY 1/> REGIONS: CPT | Mod: GP | Performed by: PHYSICAL THERAPIST

## 2023-06-27 PROCEDURE — 97110 THERAPEUTIC EXERCISES: CPT | Mod: GP | Performed by: PHYSICAL THERAPIST

## 2023-07-10 ENCOUNTER — THERAPY VISIT (OUTPATIENT)
Dept: PHYSICAL THERAPY | Facility: CLINIC | Age: 38
End: 2023-07-10
Payer: COMMERCIAL

## 2023-07-10 DIAGNOSIS — M54.6 BILATERAL THORACIC BACK PAIN: Primary | ICD-10-CM

## 2023-07-10 PROCEDURE — 97140 MANUAL THERAPY 1/> REGIONS: CPT | Mod: GP | Performed by: PHYSICAL THERAPIST

## 2023-07-10 PROCEDURE — 97110 THERAPEUTIC EXERCISES: CPT | Mod: GP | Performed by: PHYSICAL THERAPIST

## 2023-07-10 PROCEDURE — 97530 THERAPEUTIC ACTIVITIES: CPT | Mod: GP | Performed by: PHYSICAL THERAPIST

## 2023-07-23 DIAGNOSIS — M54.6 ACUTE BILATERAL THORACIC BACK PAIN: ICD-10-CM

## 2023-07-24 RX ORDER — CYCLOBENZAPRINE HCL 5 MG
TABLET ORAL
Qty: 30 TABLET | Refills: 0 | Status: SHIPPED | OUTPATIENT
Start: 2023-07-24 | End: 2024-01-15

## 2023-08-31 PROBLEM — M54.6 BILATERAL THORACIC BACK PAIN: Status: RESOLVED | Noted: 2023-05-26 | Resolved: 2023-08-31

## 2023-08-31 NOTE — PROGRESS NOTES
DISCHARGE  Reason for Discharge: Patient has met all goals.    Equipment Issued:     Discharge Plan: Patient to continue home program.    Referring Provider:  Vinita Valladares

## 2023-10-25 DIAGNOSIS — Z30.09 FAMILY PLANNING: ICD-10-CM

## 2023-10-25 RX ORDER — NORGESTIMATE AND ETHINYL ESTRADIOL 7DAYSX3 28
1 KIT ORAL DAILY
Qty: 84 TABLET | Refills: 3 | Status: SHIPPED | OUTPATIENT
Start: 2023-10-25 | End: 2024-09-19

## 2023-12-13 DIAGNOSIS — F33.0 RECURRENT MILD MAJOR DEPRESSIVE DISORDER WITH ANXIETY (H): ICD-10-CM

## 2023-12-13 DIAGNOSIS — F41.9 RECURRENT MILD MAJOR DEPRESSIVE DISORDER WITH ANXIETY (H): ICD-10-CM

## 2023-12-13 RX ORDER — FLUOXETINE 40 MG/1
40 CAPSULE ORAL DAILY
Qty: 90 CAPSULE | Refills: 0 | Status: SHIPPED | OUTPATIENT
Start: 2023-12-13 | End: 2024-01-15

## 2024-01-06 ENCOUNTER — HEALTH MAINTENANCE LETTER (OUTPATIENT)
Age: 39
End: 2024-01-06

## 2024-01-14 ASSESSMENT — PATIENT HEALTH QUESTIONNAIRE - PHQ9
10. IF YOU CHECKED OFF ANY PROBLEMS, HOW DIFFICULT HAVE THESE PROBLEMS MADE IT FOR YOU TO DO YOUR WORK, TAKE CARE OF THINGS AT HOME, OR GET ALONG WITH OTHER PEOPLE: SOMEWHAT DIFFICULT
SUM OF ALL RESPONSES TO PHQ QUESTIONS 1-9: 7
SUM OF ALL RESPONSES TO PHQ QUESTIONS 1-9: 7

## 2024-01-15 ENCOUNTER — OFFICE VISIT (OUTPATIENT)
Dept: FAMILY MEDICINE | Facility: CLINIC | Age: 39
End: 2024-01-15
Payer: COMMERCIAL

## 2024-01-15 VITALS
WEIGHT: 172.8 LBS | SYSTOLIC BLOOD PRESSURE: 137 MMHG | DIASTOLIC BLOOD PRESSURE: 93 MMHG | HEART RATE: 86 BPM | RESPIRATION RATE: 18 BRPM | OXYGEN SATURATION: 98 % | HEIGHT: 64 IN | TEMPERATURE: 97.9 F | BODY MASS INDEX: 29.5 KG/M2

## 2024-01-15 DIAGNOSIS — R41.840 INATTENTION: Primary | ICD-10-CM

## 2024-01-15 DIAGNOSIS — F33.0 RECURRENT MILD MAJOR DEPRESSIVE DISORDER WITH ANXIETY (H): ICD-10-CM

## 2024-01-15 DIAGNOSIS — R03.0 ELEVATED BP WITHOUT DIAGNOSIS OF HYPERTENSION: ICD-10-CM

## 2024-01-15 DIAGNOSIS — F41.9 RECURRENT MILD MAJOR DEPRESSIVE DISORDER WITH ANXIETY (H): ICD-10-CM

## 2024-01-15 PROCEDURE — 99214 OFFICE O/P EST MOD 30 MIN: CPT | Performed by: FAMILY MEDICINE

## 2024-01-15 PROCEDURE — 96127 BRIEF EMOTIONAL/BEHAV ASSMT: CPT | Performed by: FAMILY MEDICINE

## 2024-01-15 RX ORDER — FLUOXETINE 40 MG/1
40 CAPSULE ORAL DAILY
Qty: 90 CAPSULE | Refills: 3 | Status: SHIPPED | OUTPATIENT
Start: 2024-01-15

## 2024-01-15 RX ORDER — CETIRIZINE HYDROCHLORIDE 10 MG/1
10 TABLET ORAL DAILY
COMMUNITY

## 2024-01-15 ASSESSMENT — PAIN SCALES - GENERAL: PAINLEVEL: NO PAIN (0)

## 2024-01-15 ASSESSMENT — ANXIETY QUESTIONNAIRES
1. FEELING NERVOUS, ANXIOUS, OR ON EDGE: SEVERAL DAYS
GAD7 TOTAL SCORE: 11
5. BEING SO RESTLESS THAT IT IS HARD TO SIT STILL: NEARLY EVERY DAY
GAD7 TOTAL SCORE: 11
2. NOT BEING ABLE TO STOP OR CONTROL WORRYING: SEVERAL DAYS
6. BECOMING EASILY ANNOYED OR IRRITABLE: SEVERAL DAYS
3. WORRYING TOO MUCH ABOUT DIFFERENT THINGS: MORE THAN HALF THE DAYS
7. FEELING AFRAID AS IF SOMETHING AWFUL MIGHT HAPPEN: SEVERAL DAYS
IF YOU CHECKED OFF ANY PROBLEMS ON THIS QUESTIONNAIRE, HOW DIFFICULT HAVE THESE PROBLEMS MADE IT FOR YOU TO DO YOUR WORK, TAKE CARE OF THINGS AT HOME, OR GET ALONG WITH OTHER PEOPLE: SOMEWHAT DIFFICULT

## 2024-01-15 ASSESSMENT — PATIENT HEALTH QUESTIONNAIRE - PHQ9: 5. POOR APPETITE OR OVEREATING: MORE THAN HALF THE DAYS

## 2024-01-15 NOTE — PATIENT INSTRUCTIONS
Referral given for proper testing for attention deficit hyperactivity disorder  If you don't hear from a representative within 2 business days, please call [phone].       -Normal  BP is  119/79 or lower. Upper number is systolic Blood pressure (SBP). Lower number is diastolic  Blood pressure (DBP)      -Blood pressure between 120-139/80-89 (prehypertensive blood pressure/ class 1 hypertension )       -Hypertensive is  BP of 140/90 or above and needs treatment with medication.      -life style changes that are beneficial to reach goal of normal Blood pressure   1.Weight loss of 1 kg can reduce systolic Blood pressure  (SBP) by 1 mmHg  2.Health healthy diet (eg DASH dietary pattern can reduce SBP by 11 mmHg)  3.Structured excercise program (150 mins aerobic activity/week can reduce SBP by 5 mmHg)  4.Low salt  diet - very important.(eg: cut by 255 or 1000 mg/day to reduce SBP by 5mmHg)  5. Increase 4-5 serving of fresh vegetables & fruits /day- good source of potassium.    Other beneficial tips. Complete smoke cessation- if smoking  Reduction of alcohol     if More than 140/90  after a month of above life style changes  Return visit with MD/provider  for recheck & consideration of medications .

## 2024-01-15 NOTE — PROGRESS NOTES
"  Assessment & Plan     1. Inattention  Plan: she is wondering if anxiety is actually due to un diagnosed attention deficit hyperactivity disorder .  Referral is given and advised an office visit to discuss medications option, if diagnosed with attention deficit hyperactivity disorder   - Adult Mental Health  Referral; Future    2. Elevated BP without diagnosis of hypertension  Plan: low salt diet  Excercise and recheck Blood pressure periodically  BP Readings from Last 6 Encounters:   01/15/24 (!) 137/93   05/23/23 120/80   11/08/22 124/86   06/03/22 122/68   10/25/21 132/82   04/07/21 118/77        3. Recurrent mild major depressive disorder with anxiety  (H24)  Plan: patient reports that she would like to continue with prozac only for now.  she denies suicidal thoughts or ideation.reports no side effects from medications. Would like to continue.    - FLUoxetine (PROZAC) 40 MG capsule; Take 1 capsule (40 mg) by mouth daily  Dispense: 90 capsule; Refill: 3  - EMOTIONAL / BEHAVIORAL ASSESSMENT      11/8/2022    12:57 PM 5/22/2023     4:44 PM 1/14/2024     7:26 PM   PHQ   PHQ-9 Total Score 2 3 7   Q9: Thoughts of better off dead/self-harm past 2 weeks Not at all Not at all Not at all          3/10/2021     2:51 PM 10/25/2021    10:24 AM 1/15/2024     1:42 PM   GUERO-7 SCORE   Total Score 6 (mild anxiety) 2 (minimal anxiety)    Total Score 6 2 11      Potential medication side effects were discussed with the patient; let me know if any occur.        Prescription drug management  I spent a total of 30 minutes on the day of the visit.   Time spent by me doing chart review, history and exam, documentation and further activities per the note       BMI:   Estimated body mass index is 29.66 kg/m  as calculated from the following:    Height as of this encounter: 1.626 m (5' 4\").    Weight as of this encounter: 78.4 kg (172 lb 12.8 oz).           [facility] Weiss MD  [facility]    Subjective "   Lizet is a 38 year old, presenting for the following health issues:  A.D.H.D      History of Present Illness       Reason for visit:  Inquire about ADHD testing as requested by my therapist  Symptom onset:  More than a month  Symptoms include:  Difficulty starting/completing tasks/activities,  distraction problem, feeling unable to live up to my potential  Symptom intensity:  Severe  Symptom progression:  Staying the same  Had these symptoms before:  Yes  Has tried/received treatment for these symptoms:  Yes  Previous treatment was successful:  No  What makes it worse:  Caffiene    She eats 2-3 servings of fruits and vegetables daily.She consumes 1 sweetened beverage(s) daily.She exercises with enough effort to increase her heart rate 30 to 60 minutes per day.  She exercises with enough effort to increase her heart rate 5 days per week.   She is taking medications regularly.     Starting & completing task-even if all well- still feel paralyzed   Entire life active listening and has tried harder and harder to focus   If can not get the thought immediately afraid the thought is gone  Wants to be more productive  ' happy relationship, family, pets  Wants to be more productive at home/ cleaning  Varies from hyper focus to losing direction.    Reports stable symptoms of anxiety / depression  Would like to continue prozac  40 mg once daily   she denies suicidal thoughts or ideation.reports no side effects from medications. Would like to continue.    Feels that not living life to the fullest and comes across as lazy and that all could be from  attention deficit hyperactivity disorder and would like to be tested- and therapist asked her to get tested & treated for that.    Review of Systems   Constitutional, HEENT, cardiovascular, pulmonary, GI, , musculoskeletal, neuro, skin, endocrine and psych systems are negative, except as otherwise noted.      Objective    BP (!) 137/93   Pulse 86   Temp 97.9  F (36.6  C)  "(Temporal)   Resp 18   Ht 1.626 m (5' 4\")   Wt 78.4 kg (172 lb 12.8 oz)   LMP 12/14/2023 (Within Days)   SpO2 98%   BMI 29.66 kg/m    Body mass index is 29.66 kg/m .  Physical Exam   GENERAL: healthy, alert and no distress  NECK: no adenopathy, no asymmetry, masses, or scars and thyroid normal to palpation  RESP: lungs clear to auscultation - no rales, rhonchi or wheezes  CV: regular rate and rhythm, normal S1 S2, no S3 or S4, no murmur, click or rub, no peripheral edema and peripheral pulses strong  Psych: Alert and oriented times 3; coherent speech, normal   rate and volume, able to articulate logical thoughts, able   to abstract reason, no tangential thoughts, no hallucinations   or delusions        11/8/2022    12:57 PM 5/22/2023     4:44 PM 1/14/2024     7:26 PM   PHQ   PHQ-9 Total Score 2 3 7   Q9: Thoughts of better off dead/self-harm past 2 weeks Not at all Not at all Not at all          3/10/2021     2:51 PM 10/25/2021    10:24 AM 1/15/2024     1:42 PM   GUERO-7 SCORE   Total Score 6 (mild anxiety) 2 (minimal anxiety)    Total Score 6 2 11                      "

## 2024-01-15 NOTE — LETTER
My Depression Action Plan  Name: Lizet Condon   Date of Birth 1985  Date: 1/15/2024    My doctor: Vijaya Weiss   My clinic: [facility]  [address]  [facility] [address] [phone]            GREEN    ZONE   Good Control    What it looks like:   Things are going generally well. You have normal ups and downs. You may even feel depressed from time to time, but bad moods usually last less than a day.   What you need to do:  Continue to care for yourself (see self care plan)  Check your depression survival kit and update it as needed  Follow your physician s recommendations including any medication.  Do not stop taking medication unless you consult with your physician first.             YELLOW         ZONE Getting Worse    What it looks like:   Depression is starting to interfere with your life.   It may be hard to get out of bed; you may be starting to isolate yourself from others.  Symptoms of depression are starting to last most all day and this has happened for several days.   You may have suicidal thoughts but they are not constant.   What you need to do:     Call your care team. Your response to treatment will improve if you keep your care team informed of your progress. Yellow periods are signs an adjustment may need to be made.     Continue your self-care.  Just get dressed and ready for the day.  Don't give yourself time to talk yourself out of it.    Talk to someone in your support network.    Open up your Depression Self-Care Plan/Wellness Kit.             RED    ZONE Medical Alert - Get Help    What it looks like:   Depression is seriously interfering with your life.   You may experience these or other symptoms: You can t get out of bed most days, can t work or engage in other necessary activities, you have trouble taking care of basic hygiene, or basic responsibilities, thoughts of suicide or death that will not go  away, self-injurious behavior.     What you need to do:  Call your care team and request a same-day appointment. If they are not available (weekends or after hours) call your local crisis line, emergency room or 911.          Depression Self-Care Plan / Wellness Kit    Many people find that medication and therapy are helpful treatments for managing depression. In addition, making small changes to your everyday life can help to boost your mood and improve your wellbeing. Below are some tips for you to consider. Be sure to talk with your medical provider and/or behavioral health consultant if your symptoms are worsening or not improving.     Sleep   Sleep hygiene  means all of the habits that support good, restful sleep. It includes maintaining a consistent bedtime and wake time, using your bedroom only for sleeping or sex, and keeping the bedroom dark and free of distractions like a computer, smartphone, or television.     Develop a Healthy Routine  Maintain good hygiene. Get out of bed in the morning, make your bed, brush your teeth, take a shower, and get dressed. Don t spend too much time viewing media that makes you feel stressed. Find time to relax each day.    Exercise  Get some form of exercise every day. This will help reduce pain and release endorphins, the  feel good  chemicals in your brain. It can be as simple as just going for a walk or doing some gardening, anything that will get you moving.      Diet  Strive to eat healthy foods, including fruits and vegetables. Drink plenty of water. Avoid excessive sugar, caffeine, alcohol, and other mood-altering substances.     Stay Connected with Others  Stay in touch with friends and family members.    Manage Your Mood  Try deep breathing, massage therapy, biofeedback, or meditation. Take part in fun activities when you can. Try to find something to smile about each day.     Psychotherapy  Be open to working with a therapist if your provider recommends it.      Medication  Be sure to take your medication as prescribed. Most anti-depressants need to be taken every day. It usually takes several weeks for medications to work. Not all medicines work for all people. It is important to follow-up with your provider to make sure you have a treatment plan that is working for you. Do not stop your medication abruptly without first discussing it with your provider.    Crisis Resources   These hotlines are for both adults and children. They and are open 24 hours a day, 7 days a week unless noted otherwise.    National Suicide Prevention Lifeline   988 or 2-491-293-ZVZU (1230)    Crisis Text Line    www.crisistextline.org  Text HOME to 611003 from anywhere in the [place], anytime, about any type of crisis. A live, trained crisis counselor will receive the text and respond quickly.    [employer] Lifeline for LGBTQ Youth  A national crisis intervention and suicide lifeline for LGBTQ youth under 25. Provides a safe place to talk without judgement. Call 1-994.535.2509; text START to 785988 or visit www.thetrevorproject.org to talk to a trained counselor.    For [facility] crisis numbers, visit the [facility] website at:  https://[place].gov/dhs/people-we-serve/adults/health-care/mental-health/resources/crisis-contacts.jsp

## 2024-09-19 DIAGNOSIS — Z30.09 FAMILY PLANNING: ICD-10-CM

## 2024-09-19 RX ORDER — NORGESTIMATE AND ETHINYL ESTRADIOL 7DAYSX3 28
1 KIT ORAL DAILY
Qty: 84 TABLET | Refills: 1 | Status: SHIPPED | OUTPATIENT
Start: 2024-09-19

## 2024-10-28 ENCOUNTER — VIRTUAL VISIT (OUTPATIENT)
Dept: PSYCHOLOGY | Facility: CLINIC | Age: 39
End: 2024-10-28
Payer: COMMERCIAL

## 2024-10-28 DIAGNOSIS — Z13.39 ATTENTION DEFICIT HYPERACTIVITY DISORDER EVALUATION: ICD-10-CM

## 2024-10-28 DIAGNOSIS — F33.0 MAJOR DEPRESSIVE DISORDER, RECURRENT EPISODE, MILD (H): Primary | ICD-10-CM

## 2024-10-28 DIAGNOSIS — F41.1 GENERALIZED ANXIETY DISORDER: ICD-10-CM

## 2024-10-28 DIAGNOSIS — F43.89 ADJUSTMENT REACTION TO CHRONIC STRESS: ICD-10-CM

## 2024-10-28 PROCEDURE — 90834 PSYTX W PT 45 MINUTES: CPT | Mod: 95

## 2024-10-28 ASSESSMENT — COLUMBIA-SUICIDE SEVERITY RATING SCALE - C-SSRS
ATTEMPT LIFETIME: NO
6. HAVE YOU EVER DONE ANYTHING, STARTED TO DO ANYTHING, OR PREPARED TO DO ANYTHING TO END YOUR LIFE?: NO
1. IN THE PAST MONTH, HAVE YOU WISHED YOU WERE DEAD OR WISHED YOU COULD GO TO SLEEP AND NOT WAKE UP?: NO
TOTAL  NUMBER OF INTERRUPTED ATTEMPTS LIFETIME: NO
2. HAVE YOU ACTUALLY HAD ANY THOUGHTS OF KILLING YOURSELF?: NO
1. HAVE YOU WISHED YOU WERE DEAD OR WISHED YOU COULD GO TO SLEEP AND NOT WAKE UP?: YES
TOTAL  NUMBER OF ABORTED OR SELF INTERRUPTED ATTEMPTS LIFETIME: NO

## 2024-10-28 ASSESSMENT — ANXIETY QUESTIONNAIRES
2. NOT BEING ABLE TO STOP OR CONTROL WORRYING: SEVERAL DAYS
3. WORRYING TOO MUCH ABOUT DIFFERENT THINGS: SEVERAL DAYS
4. TROUBLE RELAXING: SEVERAL DAYS
5. BEING SO RESTLESS THAT IT IS HARD TO SIT STILL: NOT AT ALL
7. FEELING AFRAID AS IF SOMETHING AWFUL MIGHT HAPPEN: SEVERAL DAYS
GAD7 TOTAL SCORE: 7
1. FEELING NERVOUS, ANXIOUS, OR ON EDGE: MORE THAN HALF THE DAYS
GAD7 TOTAL SCORE: 7
6. BECOMING EASILY ANNOYED OR IRRITABLE: SEVERAL DAYS
GAD7 TOTAL SCORE: 7
7. FEELING AFRAID AS IF SOMETHING AWFUL MIGHT HAPPEN: SEVERAL DAYS
8. IF YOU CHECKED OFF ANY PROBLEMS, HOW DIFFICULT HAVE THESE MADE IT FOR YOU TO DO YOUR WORK, TAKE CARE OF THINGS AT HOME, OR GET ALONG WITH OTHER PEOPLE?: SOMEWHAT DIFFICULT

## 2024-10-28 ASSESSMENT — PATIENT HEALTH QUESTIONNAIRE - PHQ9
SUM OF ALL RESPONSES TO PHQ QUESTIONS 1-9: 9
10. IF YOU CHECKED OFF ANY PROBLEMS, HOW DIFFICULT HAVE THESE PROBLEMS MADE IT FOR YOU TO DO YOUR WORK, TAKE CARE OF THINGS AT HOME, OR GET ALONG WITH OTHER PEOPLE: SOMEWHAT DIFFICULT
SUM OF ALL RESPONSES TO PHQ QUESTIONS 1-9: 9
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
## 2024-10-29 ASSESSMENT — ANXIETY QUESTIONNAIRES: GAD7 TOTAL SCORE: 7

## 2024-11-03 ASSESSMENT — PATIENT HEALTH QUESTIONNAIRE - PHQ9
10. IF YOU CHECKED OFF ANY PROBLEMS, HOW DIFFICULT HAVE THESE PROBLEMS MADE IT FOR YOU TO DO YOUR WORK, TAKE CARE OF THINGS AT HOME, OR GET ALONG WITH OTHER PEOPLE: SOMEWHAT DIFFICULT
SUM OF ALL RESPONSES TO PHQ QUESTIONS 1-9: 8
SUM OF ALL RESPONSES TO PHQ QUESTIONS 1-9: 8

## 2024-11-04 ENCOUNTER — VIRTUAL VISIT (OUTPATIENT)
Dept: PSYCHOLOGY | Facility: CLINIC | Age: 39
End: 2024-11-04
Payer: COMMERCIAL

## 2024-11-04 DIAGNOSIS — Z13.39 ATTENTION DEFICIT HYPERACTIVITY DISORDER EVALUATION: ICD-10-CM

## 2024-11-04 DIAGNOSIS — F41.1 GENERALIZED ANXIETY DISORDER: ICD-10-CM

## 2024-11-04 DIAGNOSIS — F33.0 MAJOR DEPRESSIVE DISORDER, RECURRENT EPISODE, MILD (H): Primary | ICD-10-CM

## 2024-11-04 DIAGNOSIS — F43.89 ADJUSTMENT REACTION TO CHRONIC STRESS: ICD-10-CM

## 2024-11-04 PROCEDURE — 90791 PSYCH DIAGNOSTIC EVALUATION: CPT | Mod: 95

## 2024-11-04 ASSESSMENT — COLUMBIA-SUICIDE SEVERITY RATING SCALE - C-SSRS
6. IN YOUR LIFETIME, HAVE YOU EVER DONE ANYTHING, STARTED TO DO ANYTHING, OR PREPARED TO DO ANYTHING TO END YOUR LIFE?: NO
1. IN THE PAST MONTH, HAVE YOU WISHED YOU WERE DEAD OR WISHED YOU COULD GO TO SLEEP AND NOT WAKE UP?: NO
2. HAVE YOU ACTUALLY HAD ANY THOUGHTS OF KILLING YOURSELF?: NO
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
## 2024-11-15 ENCOUNTER — VIRTUAL VISIT (OUTPATIENT)
Dept: PSYCHOLOGY | Facility: CLINIC | Age: 39
End: 2024-11-15
Payer: COMMERCIAL

## 2024-11-15 DIAGNOSIS — F33.1 MAJOR DEPRESSIVE DISORDER, RECURRENT EPISODE, MODERATE (H): Primary | ICD-10-CM

## 2024-11-15 DIAGNOSIS — F41.1 GENERALIZED ANXIETY DISORDER: ICD-10-CM

## 2024-11-15 DIAGNOSIS — F43.89 ADJUSTMENT REACTION TO CHRONIC STRESS: ICD-10-CM

## 2024-11-15 PROCEDURE — 96130 PSYCL TST EVAL PHYS/QHP 1ST: CPT | Mod: 95

## 2024-11-15 PROCEDURE — 96131 PSYCL TST EVAL PHYS/QHP EA: CPT | Mod: 95

## 2024-11-15 ASSESSMENT — PATIENT HEALTH QUESTIONNAIRE - PHQ9
SUM OF ALL RESPONSES TO PHQ QUESTIONS 1-9: 10
10. IF YOU CHECKED OFF ANY PROBLEMS, HOW DIFFICULT HAVE THESE PROBLEMS MADE IT FOR YOU TO DO YOUR WORK, TAKE CARE OF THINGS AT HOME, OR GET ALONG WITH OTHER PEOPLE: SOMEWHAT DIFFICULT
SUM OF ALL RESPONSES TO PHQ QUESTIONS 1-9: 10

## 2024-11-15 ASSESSMENT — ANXIETY QUESTIONNAIRES
GAD7 TOTAL SCORE: 14
GAD7 TOTAL SCORE: 14
7. FEELING AFRAID AS IF SOMETHING AWFUL MIGHT HAPPEN: MORE THAN HALF THE DAYS
4. TROUBLE RELAXING: MORE THAN HALF THE DAYS
7. FEELING AFRAID AS IF SOMETHING AWFUL MIGHT HAPPEN: MORE THAN HALF THE DAYS
2. NOT BEING ABLE TO STOP OR CONTROL WORRYING: MORE THAN HALF THE DAYS
6. BECOMING EASILY ANNOYED OR IRRITABLE: MORE THAN HALF THE DAYS
3. WORRYING TOO MUCH ABOUT DIFFERENT THINGS: MORE THAN HALF THE DAYS
8. IF YOU CHECKED OFF ANY PROBLEMS, HOW DIFFICULT HAVE THESE MADE IT FOR YOU TO DO YOUR WORK, TAKE CARE OF THINGS AT HOME, OR GET ALONG WITH OTHER PEOPLE?: VERY DIFFICULT
5. BEING SO RESTLESS THAT IT IS HARD TO SIT STILL: MORE THAN HALF THE DAYS
1. FEELING NERVOUS, ANXIOUS, OR ON EDGE: MORE THAN HALF THE DAYS
GAD7 TOTAL SCORE: 14
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
## 2024-11-15 NOTE — Clinical Note
Hello,  I wanted to let you know that I have completed the ADHD assessment with this patient. As you will see in the report, data does not support an ADHD diagnosis. Instead, data supports MDD, GUERO, and Other Specified Trauma- and Stressor- Related Disorder. I encouraged her to make an appointment with you soon to discuss medication options. Please let me know if you have any questions or concerns!  Ghazala Tang PsyD, Postdoctoral Fellow

## 2024-11-15 NOTE — PROGRESS NOTES
[facility]   Mental Health & Addiction Services     Progress Note - ADHD Feedback Session     Patient Name: Lizet Condon  Date: November 15, 2024       Service Type:  Individual       Session Start Time: 8:00 AM  Session End Time:  8:45 AM     Session Length: 45 minutes    Session #: 3    Attendees: Patient attended alone    Service Modality: Video Visit:      Provider verified identity through the following two step process.  Patient provided:  Patient is known previously to provider    Telemedicine Visit: The patient's condition can be safely assessed and treated via synchronous audio and visual telemedicine encounter.      Reason for Telemedicine Visit: Patient convenience (e.g. access to timely appointments / distance to available provider)    Originating Site (Patient Location): Patient's home    Distant Site (Provider Location): Provider Remote Setting- Home Office    Consent:  The patient/guardian has verbally consented to: the potential risks and benefits of telemedicine (video visit) versus in person care; bill my insurance or make self-payment for services provided; and responsibility for payment of non-covered services.     Patient would like the video invitation sent by:  Send to e-mail at: [email or web address]    Mode of Communication:  Video Conference via [employer]    Distant Location (Provider):  Off-site    As the provider I attest to compliance with applicable laws and regulations related to telemedicine.          10/28/2024    10:19 AM 11/3/2024     7:52 PM 11/15/2024     7:46 AM   PHQ   PHQ-9 Total Score 9  8  10    Q9: Thoughts of better off dead/self-harm past 2 weeks Not at all  Not at all  Not at all           1/15/2024     1:42 PM 10/28/2024    10:43 AM 11/15/2024     7:48 AM   GUERO-7 SCORE   Total Score  7 (mild anxiety) 14 (moderate anxiety)   Total Score 11 7  14      DATA      Progress Since Last Session (Related to  Symptoms / Goals / Homework):   Symptoms: Stable.    Homework: Completed.      Treatment Objective(s) Addressed in This Session:   Provided feedback on ADHD evaluation. Reviewed test results in depth. Plan of care and recommendations were discussed based on testing data. See full report attached on secondary note in this encounter.     Intervention:   Provided feedback to patient regarding testing results, diagnoses, and treatment recommendations. Test results are not consistent with an ADHD diagnosis. Symptoms are better explained by MDD, GUERO, and Other Specified Trauma- and Stressor- Related Disorder. Personalized suggestions regarding symptoms were offered. Patient had the opportunity to ask questions; she expressed understanding.        ASSESSMENT: Current Emotional / Mental Status (status of significant symptoms):   Risk status (Self / Other harm or suicidal ideation)   Patient denies current fears or concerns for personal safety.   Patient denies current or recent suicidal ideation or behaviors.   Patient denies current or recent homicidal ideation or behaviors.   Patient denies current or recent self injurious behavior or ideation.   Patient denies other safety concerns.   Patient reports there has been no change in risk factors since her last session.     Patient reports there has been no change in protective factors since her last session.     Recommended that patient call 911 or go to the local ED should there be a change in any of these risk factors     Appearance:   Appropriate    Eye Contact:   Good    Psychomotor Behavior: Restless (playing with a fidget)   Attitude:   Cooperative  Friendly Pleasant   Orientation:   All   Speech    Rate / Production: Emotional    Volume:  Normal    Mood:    Sad    Affect:    Tearful (when discussing the diagnoses)   Thought Content:  Clear    Thought Form:  Coherent  Logical    Insight:    Good      Medication Review:   No changes to current psychiatric  medication(s)     Medication Compliance:   Yes     Changes in Health Issues:   None reported     Chemical Use Review:   Substance Use: Chemical use reviewed, no active concerns identified      Nicotine Use: No change in amount of tobacco use since last session. Reviewed information and resources for quitting. Provided additional recommendations in the ADHD report.    Diagnosis:  Major Depressive Disorder, Recurrent Episode, Moderate  Generalized Anxiety Disorder  Other Specified Trauma- and Stressor- Related Disorder, With insufficient symptom      PLAN:   Recommendations are outlined in full evaluation report (attached to this encounter).   Patient indicated understanding and will contact the clinic if there are further questions.    Report routed to referring provider.    Parts of this documentation may have been completed using dictation software. Potential errors may result and are unintentional.       Ghazala Wisdom PsyD, Postdoctoral Fellow    Clinical supervision and documentation review provided by Mckenna Zapata, PhD LP  11/15/24     Psychological Testing Services Summary       Testing Evaluation Services Base: 52780  (first 60 mins) Add-on: 00704  (each addtl 60 mins)   Record Review and Clarify Referral Question   10:40am-11:00am on 10/28/24 20 minutes   Clinical Decision Making/Battery Modification   11:00am-11:15am on 11/4/24 15 minutes   Integration/Report Generation   1:00pm-1:50pm on 11/11/24 ([facility])  1:50pm-2:20pm on 11/11/24 ([facility])  2:20pm-3:00pm on 11/11/24 ([facility]-3)  9:00am-10:00am on 11/12/24 (Final Report)   50 minutes  30 minutes  40 minutes  60 minutes   Interactive Feedback Session   8:00am-8:45am on 11/15/24 45 minutes   Post-Service Work   8:45am-9:00am on 11/15/24 15 minutes   Total Time: 275 minutes   Total Units: 1 4       Diagnoses:   F33.1 Major Depressive Disorder, Recurrent Episode, Moderate  F41.1 Generalized Anxiety Disorder  F43.8 Other Specified Trauma- and Stressor-  Related Disorder, With insufficient symptom

## 2024-11-16 ASSESSMENT — ANXIETY QUESTIONNAIRES: GAD7 TOTAL SCORE: 14

## 2025-01-12 ASSESSMENT — ANXIETY QUESTIONNAIRES
3. WORRYING TOO MUCH ABOUT DIFFERENT THINGS: SEVERAL DAYS
1. FEELING NERVOUS, ANXIOUS, OR ON EDGE: SEVERAL DAYS
GAD7 TOTAL SCORE: 7
2. NOT BEING ABLE TO STOP OR CONTROL WORRYING: SEVERAL DAYS
GAD7 TOTAL SCORE: 7
5. BEING SO RESTLESS THAT IT IS HARD TO SIT STILL: SEVERAL DAYS
GAD7 TOTAL SCORE: 7
IF YOU CHECKED OFF ANY PROBLEMS ON THIS QUESTIONNAIRE, HOW DIFFICULT HAVE THESE PROBLEMS MADE IT FOR YOU TO DO YOUR WORK, TAKE CARE OF THINGS AT HOME, OR GET ALONG WITH OTHER PEOPLE: SOMEWHAT DIFFICULT
6. BECOMING EASILY ANNOYED OR IRRITABLE: SEVERAL DAYS
4. TROUBLE RELAXING: SEVERAL DAYS
7. FEELING AFRAID AS IF SOMETHING AWFUL MIGHT HAPPEN: SEVERAL DAYS
8. IF YOU CHECKED OFF ANY PROBLEMS, HOW DIFFICULT HAVE THESE MADE IT FOR YOU TO DO YOUR WORK, TAKE CARE OF THINGS AT HOME, OR GET ALONG WITH OTHER PEOPLE?: SOMEWHAT DIFFICULT
7. FEELING AFRAID AS IF SOMETHING AWFUL MIGHT HAPPEN: SEVERAL DAYS

## 2025-01-12 ASSESSMENT — PATIENT HEALTH QUESTIONNAIRE - PHQ9
SUM OF ALL RESPONSES TO PHQ QUESTIONS 1-9: 5
SUM OF ALL RESPONSES TO PHQ QUESTIONS 1-9: 5
10. IF YOU CHECKED OFF ANY PROBLEMS, HOW DIFFICULT HAVE THESE PROBLEMS MADE IT FOR YOU TO DO YOUR WORK, TAKE CARE OF THINGS AT HOME, OR GET ALONG WITH OTHER PEOPLE: SOMEWHAT DIFFICULT

## 2025-01-13 ENCOUNTER — OFFICE VISIT (OUTPATIENT)
Dept: FAMILY MEDICINE | Facility: CLINIC | Age: 40
End: 2025-01-13
Payer: COMMERCIAL

## 2025-01-13 VITALS
BODY MASS INDEX: 28.97 KG/M2 | TEMPERATURE: 97.5 F | SYSTOLIC BLOOD PRESSURE: 129 MMHG | RESPIRATION RATE: 17 BRPM | OXYGEN SATURATION: 98 % | WEIGHT: 163.5 LBS | DIASTOLIC BLOOD PRESSURE: 82 MMHG | HEIGHT: 63 IN | HEART RATE: 91 BPM

## 2025-01-13 DIAGNOSIS — F41.9 RECURRENT MILD MAJOR DEPRESSIVE DISORDER WITH ANXIETY: Primary | ICD-10-CM

## 2025-01-13 DIAGNOSIS — F41.0 PANIC ATTACK: ICD-10-CM

## 2025-01-13 DIAGNOSIS — F33.0 RECURRENT MILD MAJOR DEPRESSIVE DISORDER WITH ANXIETY: Primary | ICD-10-CM

## 2025-01-13 PROCEDURE — 99214 OFFICE O/P EST MOD 30 MIN: CPT | Performed by: FAMILY MEDICINE

## 2025-01-13 RX ORDER — BUPROPION HYDROCHLORIDE 150 MG/1
150 TABLET ORAL EVERY MORNING
Qty: 30 TABLET | Refills: 3 | Status: SHIPPED | OUTPATIENT
Start: 2025-01-13 | End: 2025-01-13

## 2025-01-13 RX ORDER — FLUOXETINE 40 MG/1
40 CAPSULE ORAL DAILY
Qty: 90 CAPSULE | Refills: 3 | Status: SHIPPED | OUTPATIENT
Start: 2025-01-13

## 2025-01-13 RX ORDER — PROPRANOLOL HYDROCHLORIDE 10 MG/1
10 TABLET ORAL 3 TIMES DAILY PRN
Qty: 60 TABLET | Refills: 2 | Status: SHIPPED | OUTPATIENT
Start: 2025-01-13

## 2025-01-13 RX ORDER — BUPROPION HYDROCHLORIDE 75 MG/1
75 TABLET ORAL 2 TIMES DAILY
Qty: 60 TABLET | Refills: 1 | Status: SHIPPED | OUTPATIENT
Start: 2025-01-13

## 2025-01-13 ASSESSMENT — PAIN SCALES - GENERAL: PAINLEVEL_OUTOF10: NO PAIN (0)

## 2025-01-13 NOTE — PROGRESS NOTES
"  Assessment & Plan     1. Recurrent mild major depressive disorder with anxiety (Primary)  Plan::add new medication, Wellbutrin immediate release twice daily .  In past probably tried a higher dose with sertraline about 20 yrs ago- and it made herskin crawl- s side effects are discussed in detail   If concern with this one- send mychart & stop it.  Follow up in 4 weeks, if gong well to review dose and refills   - FLUoxetine (PROZAC) 40 MG capsule; Take 1 capsule (40 mg) by mouth daily.  Dispense: 90 capsule; Refill: 3  - buPROPion (WELLBUTRIN) 75 MG tablet; Take 1 tablet (75 mg) by mouth 2 times daily.  Dispense: 60 tablet; Refill: 1    2. Panic attack  Plan:- propranolol (INDERAL) 10 MG tablet; Take 1 tablet (10 mg) by mouth 3 times daily as needed (panic attack).  Dispense: 60 tablet; Refill: 2  Use only as needed        Prescription drug management  I spent a total of 30 minutes on the day of the visit.   Time spent by me today doing chart review, history and exam, documentation and further activities per the note    BMI  Estimated body mass index is 28.61 kg/m  as calculated from the following:    Height as of this encounter: 1.61 m (5' 3.39\").    Weight as of this encounter: 74.2 kg (163 lb 8 oz).   Weight management plan: Discussed healthy diet and exercise guidelines      Work on weight loss  Regular exercise      Prescription drug management  I spent a total of 30 minutes on the day of the visit.       Time spent by me today doing chart review, history and exam, documentation and further activities per the note          Mary Hinds is a 39 year old, presenting for the following health issues:  A.D.H.D, Anxiety, and Depression        1/13/2025    10:09 AM   Additional Questions   Roomed by Marielos     History of Present Illness       Mental Health Follow-up:  Patient presents to follow-up on Depression & Anxiety.Patient's depression since last visit has been:  Medium  The patient is not having other " "symptoms associated with depression.  Patient's anxiety since last visit has been:  Medium  The patient is not having other symptoms associated with anxiety.  Any significant life events: No  Patient is feeling anxious or having panic attacks.  Patient has no concerns about alcohol or drug use.    She eats 2-3 servings of fruits and vegetables daily.She consumes 1 sweetened beverage(s) daily.She exercises with enough effort to increase her heart rate 20 to 29 minutes per day.  She exercises with enough effort to increase her heart rate 4 days per week.   She is taking medications regularly.       Has been in therapy weekly  Has been able to get FMLA- 1-2d/4 weeks as needed - due to lack of motivation.  Evaluation completed and is negative for attention deficit hyperactivity disorder .    Has been on prozac and its really helped with anxiety and emotion regulation  No signs of extreme high or low, no nii or hypomania    Wondering how else to improve motivation  Has active job at [employer]- and now has consistent hours at work 7am to 3 pm- that also helps with sleep onset and monique to wake up on time effortless  She is able to get 70220 steps daily at work without trying.    Has been trying to avoid gluten- and feels mood and aches and pain is beter      Review of Systems  Constitutional, HEENT, cardiovascular, pulmonary, GI, , musculoskeletal, neuro, skin, endocrine and psych systems are negative, except as otherwise noted.      Objective    /82 (BP Location: Left arm, Patient Position: Sitting, Cuff Size: Adult Regular)   Pulse 91   Temp 97.5  F (36.4  C) (Skin)   Resp 17   Ht 1.61 m (5' 3.39\")   Wt 74.2 kg (163 lb 8 oz)   LMP 12/13/2024 (Within Days)   SpO2 98%   Breastfeeding No   BMI 28.61 kg/m    Body mass index is 28.61 kg/m .  Physical Exam   GENERAL: alert and no distress  NECK: no adenopathy, no asymmetry, masses, or scars  RESP: lungs clear to auscultation - no rales, rhonchi or " wheezes  CV: regular rate and rhythm, normal S1 S2, no S3 or S4, no murmur, click or rub, no peripheral edema  ABDOMEN: soft, nontender, no hepatosplenomegaly, no masses and bowel sounds normal  PSYCH: mentation appears normal, affect normal/bright      11/3/2024     7:52 PM 11/15/2024     7:46 AM 1/12/2025     6:43 PM   PHQ   PHQ-9 Total Score 8  10  5    Q9: Thoughts of better off dead/self-harm past 2 weeks Not at all Not at all Not at all       Patient-reported          10/28/2024    10:43 AM 11/15/2024     7:48 AM 1/12/2025     6:44 PM   GUERO-7 SCORE   Total Score 7 (mild anxiety) 14 (moderate anxiety) 7 (mild anxiety)   Total Score 7  14  7        Patient-reported        Wt Readings from Last 5 Encounters:   01/13/25 74.2 kg (163 lb 8 oz)   01/15/24 78.4 kg (172 lb 12.8 oz)   05/23/23 77.9 kg (171 lb 11.2 oz)   11/08/22 76.7 kg (169 lb)   10/25/21 73.3 kg (161 lb 11.2 oz)            Signed Electronically by: Vijaya Weiss MD

## 2025-01-13 NOTE — PATIENT INSTRUCTIONS
1. Recurrent mild major depressive disorder with anxiety (Primary)  Plan:add new medications  Wellbutrin immediate release twice daily   If concern with this one- send mychart & stop it.  Follow up in 4 weeks, if gong well to review dose and refills   - FLUoxetine (PROZAC) 40 MG capsule; Take 1 capsule (40 mg) by mouth daily.  Dispense: 90 capsule; Refill: 3  - buPROPion (WELLBUTRIN) 75 MG tablet; Take 1 tablet (75 mg) by mouth 2 times daily.  Dispense: 60 tablet; Refill: 1    2. Panic attack  Plan:- propranolol (INDERAL) 10 MG tablet; Take 1 tablet (10 mg) by mouth 3 times daily as needed (panic attack).  Dispense: 60 tablet; Refill: 2

## 2025-01-25 ENCOUNTER — HEALTH MAINTENANCE LETTER (OUTPATIENT)
Age: 40
End: 2025-01-25

## 2025-02-04 ENCOUNTER — TELEPHONE (OUTPATIENT)
Dept: FAMILY MEDICINE | Facility: CLINIC | Age: 40
End: 2025-02-04
Payer: COMMERCIAL

## 2025-04-02 ENCOUNTER — VIRTUAL VISIT (OUTPATIENT)
Dept: FAMILY MEDICINE | Facility: CLINIC | Age: 40
End: 2025-04-02
Payer: COMMERCIAL

## 2025-04-02 DIAGNOSIS — F33.0 RECURRENT MILD MAJOR DEPRESSIVE DISORDER WITH ANXIETY: ICD-10-CM

## 2025-04-02 DIAGNOSIS — F41.9 RECURRENT MILD MAJOR DEPRESSIVE DISORDER WITH ANXIETY: ICD-10-CM

## 2025-04-02 PROCEDURE — 98005 SYNCH AUDIO-VIDEO EST LOW 20: CPT | Performed by: FAMILY MEDICINE

## 2025-04-02 RX ORDER — BUPROPION HYDROCHLORIDE 75 MG/1
75 TABLET ORAL 2 TIMES DAILY
Qty: 180 TABLET | Refills: 3 | Status: SHIPPED | OUTPATIENT
Start: 2025-04-02 | End: 2026-03-28

## 2025-04-02 ASSESSMENT — PATIENT HEALTH QUESTIONNAIRE - PHQ9
10. IF YOU CHECKED OFF ANY PROBLEMS, HOW DIFFICULT HAVE THESE PROBLEMS MADE IT FOR YOU TO DO YOUR WORK, TAKE CARE OF THINGS AT HOME, OR GET ALONG WITH OTHER PEOPLE: NOT DIFFICULT AT ALL
SUM OF ALL RESPONSES TO PHQ QUESTIONS 1-9: 3
SUM OF ALL RESPONSES TO PHQ QUESTIONS 1-9: 3

## 2025-04-02 NOTE — PROGRESS NOTES
Lizet is a 39 year old who is being evaluated via a billable video visit.    How would you like to obtain your AVS? MyChart  If the video visit is dropped, the invitation should be resent by: Send to e-mail at: [email or web address]  Will anyone else be joining your video visit? No      Assessment & Plan   39 year old female , follow up for depression medications refill. Depression has improved with addition of Wellbutrin 75 mg twice daily  Does not want it changed to extended release or different dose    1. Recurrent mild major depressive disorder with anxiety  Plan:- buPROPion (WELLBUTRIN) 75 MG tablet; Take 1 tablet (75 mg) by mouth 2 times daily.  Dispense: 180 tablet; Refill: 3    Continue with fluoxetine 40 mg once daily   Potential medication side effects were discussed with the patient; let me know if any occur.      1/12/2025     6:43 PM 3/26/2025     3:47 PM 4/2/2025     3:40 PM   PHQ   PHQ-9 Total Score 5  3  3    Q9: Thoughts of better off dead/self-harm past 2 weeks Not at all Not at all Not at all       Patient-reported      Prescription drug management      Subjective   Lizet is a 39 year old, presenting for the following health issues:  No chief complaint on file.    HPI      Feels better on Wellbutrin 75 mg  1 tab twice daily .   Did start it in [place] and felt too overwhelmed with sights and sounds initially so stopped Wellbutrin and now for past 3 weeks taken it consistently and likes its impact on depression .  she denies suicidal thoughts or ideation.reports no side effects from medications. Would like to continue.  Has taken inderal once to help with anxiety attack    Review of Systems  Constitutional, HEENT, cardiovascular, pulmonary, GI, , musculoskeletal, neuro, skin, endocrine and psych systems are negative, except as otherwise noted.      Objective           Vitals:  No vitals were obtained today due to virtual visit.    Physical Exam   GENERAL: alert and no distress  EYES: Eyes  grossly normal to inspection.  No discharge or erythema, or obvious scleral/conjunctival abnormalities.  RESP: No audible wheeze, cough, or visible cyanosis.    SKIN: Visible skin clear. No significant rash, abnormal pigmentation or lesions.  NEURO: Cranial nerves grossly intact.  Mentation and speech appropriate for age.  PSYCH: Appropriate affect, tone, and pace of words          Video-Visit Details    Type of service:  Video Visit   Originating Location (pt. Location): Home    Distant Location (provider location):  On-site  Platform used for Video Visit: [facility]  Signed Electronically by: Vijaya Weiss MD    Answers submitted by the patient for this visit:  Patient Health Questionnaire (Submitted on 4/2/2025)  If you checked off any problems, how difficult have these problems made it for you to do your work, take care of things at home, or get along with other people?: Not difficult at all  PHQ9 TOTAL SCORE: 3

## 2025-05-27 DIAGNOSIS — Z30.09 FAMILY PLANNING: ICD-10-CM

## 2025-05-27 RX ORDER — NORGESTIMATE AND ETHINYL ESTRADIOL 7DAYSX3 28
1 KIT ORAL DAILY
Qty: 84 TABLET | Refills: 0 | Status: SHIPPED | OUTPATIENT
Start: 2025-05-27

## 2025-06-11 ENCOUNTER — OFFICE VISIT (OUTPATIENT)
Dept: FAMILY MEDICINE | Facility: CLINIC | Age: 40
End: 2025-06-11
Payer: COMMERCIAL

## 2025-06-11 VITALS
RESPIRATION RATE: 16 BRPM | HEART RATE: 85 BPM | HEIGHT: 63 IN | SYSTOLIC BLOOD PRESSURE: 124 MMHG | TEMPERATURE: 97.3 F | OXYGEN SATURATION: 99 % | WEIGHT: 164.2 LBS | BODY MASS INDEX: 29.09 KG/M2 | DIASTOLIC BLOOD PRESSURE: 83 MMHG

## 2025-06-11 DIAGNOSIS — M54.41 ACUTE RIGHT-SIDED LOW BACK PAIN WITH RIGHT-SIDED SCIATICA: Primary | ICD-10-CM

## 2025-06-11 PROCEDURE — 1125F AMNT PAIN NOTED PAIN PRSNT: CPT | Performed by: FAMILY MEDICINE

## 2025-06-11 PROCEDURE — 99213 OFFICE O/P EST LOW 20 MIN: CPT | Performed by: FAMILY MEDICINE

## 2025-06-11 PROCEDURE — 3074F SYST BP LT 130 MM HG: CPT | Performed by: FAMILY MEDICINE

## 2025-06-11 PROCEDURE — 3079F DIAST BP 80-89 MM HG: CPT | Performed by: FAMILY MEDICINE

## 2025-06-11 ASSESSMENT — PAIN SCALES - PAIN ENJOYMENT GENERAL ACTIVITY SCALE (PEG)
PEG_TOTALSCORE: 6
INTERFERED_GENERAL_ACTIVITY: 6
AVG_PAIN_PASTWEEK: 6
INTERFERED_ENJOYMENT_LIFE: 6

## 2025-06-11 ASSESSMENT — PAIN SCALES - GENERAL: PAINLEVEL_OUTOF10: MODERATE PAIN (5)

## 2025-06-11 NOTE — PROGRESS NOTES
Assessment & Plan     Acute right-sided low back pain with right-sided sciatica  Plan: conservative treatment with avoiding posture and activity causing the pain  NSAID with meals.  Core strengthening excercise   Evaluate and treat with PHYSICAL THERAPY   - Physical Therapy  Referral; Future            Follow-up    Follow-up Visit   Expected date:  Jul 11, 2025 (Approximate)      Follow Up Appointment Details:     Follow-up with whom?: Me    Follow-Up for what?: Other (Office Visit)    How?: Kori Hinds is a 39 year old, presenting for the following health issues:  Musculoskeletal Problem (Right hip)    History of Present Illness       Reason for visit:  Pain in right hip radiating down leg  Symptom onset:  More than a month  Symptoms include:  Pain and stiffness in hip and leg, hurts to drive, sit, walk  Symptom intensity:  Severe  Symptom progression:  Staying the same  Had these symptoms before:  No  What makes it worse:  Inacrivity  What makes it better:  Movement/activity bending squatting etc   She is taking medications regularly.      Persistent pain in right hip, getting worse and traveling down leg. Only position with no pain is standing. Hard to sleep and sit/drive.     Pain History:  When did you first notice your pain? More than a month ago    Have you seen anyone else for your pain? No  How has your pain affected your ability to work? Pain does not limit ability to work   What type of work do you or did you do?   Where in your body do you have pain? Musculoskeletal problem/pain  Onset/Duration: More than a month ago   Description  Location: Hip  - right  Joint Swelling: No  Redness: No  Pain: YES - at worst 7/10   Warmth: No  Intensity:  severe  Progression of Symptoms:  same  Accompanying signs and symptoms:   Fevers: No  Numbness/tingling/weakness: YES- Tingling radiating down leg   History  Trauma to the area: No  Recent illness:  No  Previous similar  "problem: No  Previous evaluation:  No  Precipitating or alleviating factors:  Aggravating factors include: none  Therapies tried and outcome: stretching          Review of Systems  Constitutional, HEENT, cardiovascular, pulmonary, GI, , musculoskeletal, neuro, skin, endocrine and psych systems are negative, except as otherwise noted.      Objective    /83 (BP Location: Left arm, Patient Position: Sitting, Cuff Size: Adult Regular)   Pulse 85   Temp 97.3  F (36.3  C) (Skin)   Resp 16   Ht 1.605 m (5' 3.19\")   Wt 74.5 kg (164 lb 3.2 oz)   LMP 06/04/2025   SpO2 99%   Breastfeeding No   BMI 28.91 kg/m    Body mass index is 28.91 kg/m .  Physical Exam   GENERAL: alert and no distress  NECK: no adenopathy, no asymmetry, masses, or scars  RESP: lungs clear to auscultation - no rales, rhonchi or wheezes  CV: regular rate and rhythm, normal S1 S2, no S3 or S4, no murmur, click or rub, no peripheral edema  MS: no gross musculoskeletal defects noted, no edema  SKIN: no suspicious lesions or rashes  NEURO: Normal strength and tone, DTR's normal and symmetric post SLR on the right ,             Signed Electronically by: Vijaya Weiss MD    "

## 2025-06-16 ENCOUNTER — PATIENT OUTREACH (OUTPATIENT)
Dept: CARE COORDINATION | Facility: CLINIC | Age: 40
End: 2025-06-16
Payer: COMMERCIAL

## 2025-08-21 DIAGNOSIS — Z30.09 FAMILY PLANNING: ICD-10-CM

## 2025-08-21 RX ORDER — NORGESTIMATE AND ETHINYL ESTRADIOL 7DAYSX3 28
1 KIT ORAL DAILY
Qty: 84 TABLET | Refills: 0 | Status: SHIPPED | OUTPATIENT
Start: 2025-08-21

## (undated) DEVICE — PREP CHLORAPREP 26ML TINTED ORANGE  260815

## (undated) DEVICE — GOWN LG DISP 9515

## (undated) DEVICE — WIRE GUIDE 0.054X6" ACUTRAK SGL TROCAR TIP SS WS-1406ST

## (undated) DEVICE — DRSG KERLIX 2 1/4"X3YDS ROLL 6720

## (undated) DEVICE — BLADE KNIFE SURG 15 371115

## (undated) DEVICE — DRILL BIT ACUMED QUICK COUPLER 2.0MM 80-0318

## (undated) DEVICE — DRSG KERLIX 4 1/2"X4YDS ROLL 6730

## (undated) DEVICE — SLING ARM MED 79-99155

## (undated) DEVICE — SU VICRYL 4-0 PS-2 18" UND J496G

## (undated) DEVICE — SOL NACL 0.9% IRRIG 1000ML BOTTLE 2F7124

## (undated) DEVICE — DRSG GAUZE 4X4" TRAY 6939

## (undated) DEVICE — SU VICRYL 2-0 CT-2 27" UND J269H

## (undated) DEVICE — CAST PADDING 3" STERILE 9043S

## (undated) DEVICE — DRSG XEROFORM 1X8"

## (undated) DEVICE — DRSG GAUZE 4X4" 3033

## (undated) DEVICE — DRAPE STOCKINETTE 4" 8544

## (undated) DEVICE — TOURNIQUET SGL BLADDER 18"X4" RED 5921-218-135

## (undated) DEVICE — BRUSH SURGICAL SCRUB W/4% CHG SOL 25ML 371073

## (undated) DEVICE — Device

## (undated) DEVICE — SOL WATER IRRIG 1000ML BOTTLE 2F7114

## (undated) DEVICE — SLING ARM LG 79-99157

## (undated) DEVICE — GLOVE PROTEXIS POWDER FREE SMT 6.5  2D72PT65X

## (undated) DEVICE — LINEN ORTHO PACK 5446

## (undated) DEVICE — PACK HAND CUSTOM ASC

## (undated) DEVICE — DRAPE C-ARM OEC MINI VIEW 6800   00-901917-01

## (undated) DEVICE — DRSG KERLIX FLUFFS X5

## (undated) DEVICE — SUCTION MANIFOLD NEPTUNE 2 SYS 4 PORT 0702-020-000

## (undated) DEVICE — SU ETHILON 4-0 PS-2 18" BLACK 1667H

## (undated) DEVICE — GLOVE PROTEXIS BLUE W/NEU-THERA 6.5  2D73EB65

## (undated) DEVICE — CAST PLASTER SPLINT 3X15" 7393

## (undated) RX ORDER — GABAPENTIN 300 MG/1
CAPSULE ORAL
Status: DISPENSED
Start: 2017-10-31

## (undated) RX ORDER — PROPOFOL 10 MG/ML
INJECTION, EMULSION INTRAVENOUS
Status: DISPENSED
Start: 2017-10-31

## (undated) RX ORDER — ACETAMINOPHEN 500 MG
TABLET ORAL
Status: DISPENSED
Start: 2017-10-31

## (undated) RX ORDER — LIDOCAINE HYDROCHLORIDE 20 MG/ML
INJECTION, SOLUTION EPIDURAL; INFILTRATION; INTRACAUDAL; PERINEURAL
Status: DISPENSED
Start: 2017-10-31

## (undated) RX ORDER — FENTANYL CITRATE 50 UG/ML
INJECTION, SOLUTION INTRAMUSCULAR; INTRAVENOUS
Status: DISPENSED
Start: 2017-10-31

## (undated) RX ORDER — ACETAMINOPHEN 325 MG/1
TABLET ORAL
Status: DISPENSED
Start: 2017-10-31